# Patient Record
Sex: FEMALE | Race: BLACK OR AFRICAN AMERICAN | Employment: OTHER | URBAN - METROPOLITAN AREA
[De-identification: names, ages, dates, MRNs, and addresses within clinical notes are randomized per-mention and may not be internally consistent; named-entity substitution may affect disease eponyms.]

---

## 2017-01-24 ENCOUNTER — TELEPHONE (OUTPATIENT)
Dept: INTERNAL MEDICINE CLINIC | Age: 67
End: 2017-01-24

## 2017-01-24 RX ORDER — ZOLPIDEM TARTRATE 10 MG/1
TABLET ORAL
Qty: 90 TABLET | Refills: 1 | OUTPATIENT
Start: 2017-01-24 | End: 2017-05-01 | Stop reason: SDUPTHER

## 2017-01-24 RX ORDER — LOSARTAN POTASSIUM 100 MG/1
TABLET ORAL
Qty: 90 TABLET | Refills: 3 | Status: SHIPPED | OUTPATIENT
Start: 2017-01-24 | End: 2018-01-22 | Stop reason: SDUPTHER

## 2017-01-24 RX ORDER — HYDROCHLOROTHIAZIDE 12.5 MG/1
CAPSULE, GELATIN COATED ORAL
Qty: 90 CAPSULE | Refills: 3 | Status: SHIPPED | OUTPATIENT
Start: 2017-01-24 | End: 2018-01-22 | Stop reason: SDUPTHER

## 2017-03-20 ENCOUNTER — TELEPHONE (OUTPATIENT)
Dept: INTERNAL MEDICINE CLINIC | Age: 67
End: 2017-03-20

## 2017-04-18 ENCOUNTER — OFFICE VISIT (OUTPATIENT)
Dept: INTERNAL MEDICINE CLINIC | Age: 67
End: 2017-04-18

## 2017-04-18 VITALS
HEART RATE: 88 BPM | BODY MASS INDEX: 27.64 KG/M2 | HEIGHT: 66 IN | WEIGHT: 172 LBS | SYSTOLIC BLOOD PRESSURE: 134 MMHG | DIASTOLIC BLOOD PRESSURE: 84 MMHG

## 2017-04-18 DIAGNOSIS — Z79.4 TYPE 2 DIABETES MELLITUS WITHOUT COMPLICATION, WITH LONG-TERM CURRENT USE OF INSULIN (HCC): Chronic | ICD-10-CM

## 2017-04-18 DIAGNOSIS — F17.210 CIGARETTE NICOTINE DEPENDENCE WITHOUT COMPLICATION: Chronic | ICD-10-CM

## 2017-04-18 DIAGNOSIS — I10 ESSENTIAL HYPERTENSION: Primary | Chronic | ICD-10-CM

## 2017-04-18 DIAGNOSIS — D50.9 MICROCYTIC ANEMIA: Chronic | ICD-10-CM

## 2017-04-18 DIAGNOSIS — E11.9 TYPE 2 DIABETES MELLITUS WITHOUT COMPLICATION, WITH LONG-TERM CURRENT USE OF INSULIN (HCC): Chronic | ICD-10-CM

## 2017-04-18 PROCEDURE — 4004F PT TOBACCO SCREEN RCVD TLK: CPT | Performed by: INTERNAL MEDICINE

## 2017-04-18 PROCEDURE — 1123F ACP DISCUSS/DSCN MKR DOCD: CPT | Performed by: INTERNAL MEDICINE

## 2017-04-18 PROCEDURE — 3044F HG A1C LEVEL LT 7.0%: CPT | Performed by: INTERNAL MEDICINE

## 2017-04-18 PROCEDURE — 4040F PNEUMOC VAC/ADMIN/RCVD: CPT | Performed by: INTERNAL MEDICINE

## 2017-04-18 PROCEDURE — G8399 PT W/DXA RESULTS DOCUMENT: HCPCS | Performed by: INTERNAL MEDICINE

## 2017-04-18 PROCEDURE — 99213 OFFICE O/P EST LOW 20 MIN: CPT | Performed by: INTERNAL MEDICINE

## 2017-04-18 PROCEDURE — 3014F SCREEN MAMMO DOC REV: CPT | Performed by: INTERNAL MEDICINE

## 2017-04-18 PROCEDURE — 3017F COLORECTAL CA SCREEN DOC REV: CPT | Performed by: INTERNAL MEDICINE

## 2017-04-18 PROCEDURE — G8427 DOCREV CUR MEDS BY ELIG CLIN: HCPCS | Performed by: INTERNAL MEDICINE

## 2017-04-18 PROCEDURE — 1090F PRES/ABSN URINE INCON ASSESS: CPT | Performed by: INTERNAL MEDICINE

## 2017-04-18 PROCEDURE — G8420 CALC BMI NORM PARAMETERS: HCPCS | Performed by: INTERNAL MEDICINE

## 2017-05-01 ENCOUNTER — TELEPHONE (OUTPATIENT)
Dept: INTERNAL MEDICINE CLINIC | Age: 67
End: 2017-05-01

## 2017-05-01 DIAGNOSIS — F51.01 PRIMARY INSOMNIA: Primary | Chronic | ICD-10-CM

## 2017-05-01 DIAGNOSIS — Z79.4 TYPE 2 DIABETES MELLITUS WITHOUT COMPLICATION, WITH LONG-TERM CURRENT USE OF INSULIN (HCC): ICD-10-CM

## 2017-05-01 DIAGNOSIS — E11.9 TYPE 2 DIABETES MELLITUS WITHOUT COMPLICATION, WITH LONG-TERM CURRENT USE OF INSULIN (HCC): ICD-10-CM

## 2017-05-01 RX ORDER — ZOLPIDEM TARTRATE 10 MG/1
TABLET ORAL
Qty: 90 TABLET | Refills: 1 | Status: SHIPPED | OUTPATIENT
Start: 2017-05-01 | End: 2017-10-17 | Stop reason: SDUPTHER

## 2017-08-22 ENCOUNTER — OFFICE VISIT (OUTPATIENT)
Dept: INTERNAL MEDICINE CLINIC | Age: 67
End: 2017-08-22

## 2017-08-22 VITALS
HEIGHT: 66 IN | HEART RATE: 72 BPM | BODY MASS INDEX: 27.32 KG/M2 | DIASTOLIC BLOOD PRESSURE: 84 MMHG | WEIGHT: 170 LBS | SYSTOLIC BLOOD PRESSURE: 122 MMHG

## 2017-08-22 DIAGNOSIS — Z23 NEED FOR PROPHYLACTIC VACCINATION AND INOCULATION AGAINST VARICELLA: ICD-10-CM

## 2017-08-22 DIAGNOSIS — Z12.11 SCREENING FOR COLON CANCER: ICD-10-CM

## 2017-08-22 DIAGNOSIS — E11.9 TYPE 2 DIABETES MELLITUS WITHOUT COMPLICATION, WITH LONG-TERM CURRENT USE OF INSULIN (HCC): Chronic | ICD-10-CM

## 2017-08-22 DIAGNOSIS — I10 ESSENTIAL HYPERTENSION: Primary | Chronic | ICD-10-CM

## 2017-08-22 DIAGNOSIS — Z87.891 HISTORY OF SMOKING 30 OR MORE PACK YEARS: ICD-10-CM

## 2017-08-22 DIAGNOSIS — F17.210 CIGARETTE NICOTINE DEPENDENCE WITHOUT COMPLICATION: Chronic | ICD-10-CM

## 2017-08-22 DIAGNOSIS — J44.9 CHRONIC OBSTRUCTIVE PULMONARY DISEASE, UNSPECIFIED COPD TYPE (HCC): Chronic | ICD-10-CM

## 2017-08-22 DIAGNOSIS — Z79.4 TYPE 2 DIABETES MELLITUS WITHOUT COMPLICATION, WITH LONG-TERM CURRENT USE OF INSULIN (HCC): Chronic | ICD-10-CM

## 2017-08-22 LAB
BASOPHILS ABSOLUTE: 0.2 K/UL (ref 0–0.2)
BASOPHILS RELATIVE PERCENT: 1.3 %
EOSINOPHILS ABSOLUTE: 0.2 K/UL (ref 0–0.6)
EOSINOPHILS RELATIVE PERCENT: 1.9 %
HCT VFR BLD CALC: 35.8 % (ref 36–48)
HEMOGLOBIN: 11.3 G/DL (ref 12–16)
LYMPHOCYTES ABSOLUTE: 3.3 K/UL (ref 1–5.1)
LYMPHOCYTES RELATIVE PERCENT: 29 %
MCH RBC QN AUTO: 25.2 PG (ref 26–34)
MCHC RBC AUTO-ENTMCNC: 31.7 G/DL (ref 31–36)
MCV RBC AUTO: 79.4 FL (ref 80–100)
MONOCYTES ABSOLUTE: 0.5 K/UL (ref 0–1.3)
MONOCYTES RELATIVE PERCENT: 4.6 %
NEUTROPHILS ABSOLUTE: 7.3 K/UL (ref 1.7–7.7)
NEUTROPHILS RELATIVE PERCENT: 63.2 %
PDW BLD-RTO: 16.1 % (ref 12.4–15.4)
PLATELET # BLD: 279 K/UL (ref 135–450)
PMV BLD AUTO: 10.2 FL (ref 5–10.5)
RBC # BLD: 4.51 M/UL (ref 4–5.2)
REASON FOR REJECTION: NORMAL
REJECTED TEST: NORMAL
WBC # BLD: 11.5 K/UL (ref 4–11)

## 2017-08-22 PROCEDURE — G8427 DOCREV CUR MEDS BY ELIG CLIN: HCPCS | Performed by: INTERNAL MEDICINE

## 2017-08-22 PROCEDURE — G8926 SPIRO NO PERF OR DOC: HCPCS | Performed by: INTERNAL MEDICINE

## 2017-08-22 PROCEDURE — 1123F ACP DISCUSS/DSCN MKR DOCD: CPT | Performed by: INTERNAL MEDICINE

## 2017-08-22 PROCEDURE — G9016 DEMO-SMOKING CESSATION COUN: HCPCS | Performed by: INTERNAL MEDICINE

## 2017-08-22 PROCEDURE — 3017F COLORECTAL CA SCREEN DOC REV: CPT | Performed by: INTERNAL MEDICINE

## 2017-08-22 PROCEDURE — 4004F PT TOBACCO SCREEN RCVD TLK: CPT | Performed by: INTERNAL MEDICINE

## 2017-08-22 PROCEDURE — 1090F PRES/ABSN URINE INCON ASSESS: CPT | Performed by: INTERNAL MEDICINE

## 2017-08-22 PROCEDURE — 99214 OFFICE O/P EST MOD 30 MIN: CPT | Performed by: INTERNAL MEDICINE

## 2017-08-22 PROCEDURE — 3023F SPIROM DOC REV: CPT | Performed by: INTERNAL MEDICINE

## 2017-08-22 PROCEDURE — 3046F HEMOGLOBIN A1C LEVEL >9.0%: CPT | Performed by: INTERNAL MEDICINE

## 2017-08-22 PROCEDURE — G8399 PT W/DXA RESULTS DOCUMENT: HCPCS | Performed by: INTERNAL MEDICINE

## 2017-08-22 PROCEDURE — 4040F PNEUMOC VAC/ADMIN/RCVD: CPT | Performed by: INTERNAL MEDICINE

## 2017-08-22 PROCEDURE — G8419 CALC BMI OUT NRM PARAM NOF/U: HCPCS | Performed by: INTERNAL MEDICINE

## 2017-08-22 PROCEDURE — 3014F SCREEN MAMMO DOC REV: CPT | Performed by: INTERNAL MEDICINE

## 2017-08-22 ASSESSMENT — PATIENT HEALTH QUESTIONNAIRE - PHQ9
SUM OF ALL RESPONSES TO PHQ QUESTIONS 1-9: 0
1. LITTLE INTEREST OR PLEASURE IN DOING THINGS: 0
SUM OF ALL RESPONSES TO PHQ9 QUESTIONS 1 & 2: 0
2. FEELING DOWN, DEPRESSED OR HOPELESS: 0

## 2017-08-23 LAB
A/G RATIO: 2 (ref 1.1–2.2)
ALBUMIN SERPL-MCNC: 4.7 G/DL (ref 3.4–5)
ALP BLD-CCNC: 75 U/L (ref 40–129)
ALT SERPL-CCNC: 10 U/L (ref 10–40)
ANION GAP SERPL CALCULATED.3IONS-SCNC: 16 MMOL/L (ref 3–16)
AST SERPL-CCNC: 12 U/L (ref 15–37)
BILIRUB SERPL-MCNC: 0.5 MG/DL (ref 0–1)
BUN BLDV-MCNC: 21 MG/DL (ref 7–20)
CALCIUM SERPL-MCNC: 9.8 MG/DL (ref 8.3–10.6)
CHLORIDE BLD-SCNC: 103 MMOL/L (ref 99–110)
CHOLESTEROL, TOTAL: 225 MG/DL (ref 0–199)
CO2: 23 MMOL/L (ref 21–32)
CREAT SERPL-MCNC: 0.7 MG/DL (ref 0.6–1.2)
ESTIMATED AVERAGE GLUCOSE: 159.9 MG/DL
GFR AFRICAN AMERICAN: >60
GFR NON-AFRICAN AMERICAN: >60
GLOBULIN: 2.4 G/DL
GLUCOSE BLD-MCNC: 112 MG/DL (ref 70–99)
HBA1C MFR BLD: 7.2 %
HDLC SERPL-MCNC: 78 MG/DL (ref 40–60)
LDL CHOLESTEROL CALCULATED: 129 MG/DL
POTASSIUM SERPL-SCNC: 4.7 MMOL/L (ref 3.5–5.1)
SODIUM BLD-SCNC: 142 MMOL/L (ref 136–145)
T4 FREE: 1.2 NG/DL (ref 0.9–1.8)
TOTAL PROTEIN: 7.1 G/DL (ref 6.4–8.2)
TRIGL SERPL-MCNC: 92 MG/DL (ref 0–150)
TSH SERPL DL<=0.05 MIU/L-ACNC: 0.75 UIU/ML (ref 0.27–4.2)
VLDLC SERPL CALC-MCNC: 18 MG/DL

## 2017-09-19 ENCOUNTER — HOSPITAL ENCOUNTER (OUTPATIENT)
Dept: CT IMAGING | Age: 67
Discharge: HOME | End: 2017-09-19
Admitting: INTERNAL MEDICINE

## 2017-09-19 VITALS — RESPIRATION RATE: 18 BRPM | OXYGEN SATURATION: 100 % | HEART RATE: 56 BPM

## 2017-09-19 DIAGNOSIS — Z87.891 HISTORY OF SMOKING 30 OR MORE PACK YEARS: ICD-10-CM

## 2017-09-19 DIAGNOSIS — Z87.891 PERSONAL HISTORY OF NICOTINE DEPENDENCE: ICD-10-CM

## 2017-10-17 DIAGNOSIS — F51.01 PRIMARY INSOMNIA: Chronic | ICD-10-CM

## 2017-10-17 RX ORDER — ZOLPIDEM TARTRATE 10 MG/1
TABLET ORAL
Qty: 90 TABLET | Refills: 1 | Status: SHIPPED | OUTPATIENT
Start: 2017-10-17 | End: 2018-04-02 | Stop reason: SDUPTHER

## 2017-11-08 ENCOUNTER — TELEPHONE (OUTPATIENT)
Dept: CASE MANAGEMENT | Age: 67
End: 2017-11-08

## 2017-11-08 NOTE — TELEPHONE ENCOUNTER
Follow up lung screen on 9/19/17. LRAD1. Recommended screen in one year. Reviewed by ordering physician and ordering office contacts pt with results.

## 2018-02-21 ENCOUNTER — OFFICE VISIT (OUTPATIENT)
Dept: INTERNAL MEDICINE CLINIC | Age: 68
End: 2018-02-21

## 2018-02-21 VITALS
HEART RATE: 72 BPM | WEIGHT: 170 LBS | HEIGHT: 66 IN | DIASTOLIC BLOOD PRESSURE: 68 MMHG | SYSTOLIC BLOOD PRESSURE: 112 MMHG | BODY MASS INDEX: 27.32 KG/M2

## 2018-02-21 DIAGNOSIS — J44.9 CHRONIC OBSTRUCTIVE PULMONARY DISEASE, UNSPECIFIED COPD TYPE (HCC): Chronic | ICD-10-CM

## 2018-02-21 DIAGNOSIS — G89.29 CHRONIC RIGHT-SIDED LOW BACK PAIN WITHOUT SCIATICA: ICD-10-CM

## 2018-02-21 DIAGNOSIS — Z12.31 ENCOUNTER FOR SCREENING MAMMOGRAM FOR BREAST CANCER: ICD-10-CM

## 2018-02-21 DIAGNOSIS — Z79.4 TYPE 2 DIABETES MELLITUS WITHOUT COMPLICATION, WITH LONG-TERM CURRENT USE OF INSULIN (HCC): Chronic | ICD-10-CM

## 2018-02-21 DIAGNOSIS — Z12.11 COLON CANCER SCREENING: ICD-10-CM

## 2018-02-21 DIAGNOSIS — E11.9 TYPE 2 DIABETES MELLITUS WITHOUT COMPLICATION, WITH LONG-TERM CURRENT USE OF INSULIN (HCC): Chronic | ICD-10-CM

## 2018-02-21 DIAGNOSIS — I10 ESSENTIAL HYPERTENSION: Primary | Chronic | ICD-10-CM

## 2018-02-21 DIAGNOSIS — M54.50 CHRONIC RIGHT-SIDED LOW BACK PAIN WITHOUT SCIATICA: ICD-10-CM

## 2018-02-21 DIAGNOSIS — I70.0 ATHEROSCLEROSIS OF ABDOMINAL AORTA (HCC): Chronic | ICD-10-CM

## 2018-02-21 DIAGNOSIS — F17.210 CIGARETTE NICOTINE DEPENDENCE WITHOUT COMPLICATION: Chronic | ICD-10-CM

## 2018-02-21 LAB
CREATININE URINE: 144.6 MG/DL (ref 28–259)
ESTIMATED AVERAGE GLUCOSE: 165.7 MG/DL
HBA1C MFR BLD: 7.4 %
MICROALBUMIN UR-MCNC: 2 MG/DL
MICROALBUMIN/CREAT UR-RTO: 13.8 MG/G (ref 0–30)

## 2018-02-21 PROCEDURE — G8399 PT W/DXA RESULTS DOCUMENT: HCPCS | Performed by: INTERNAL MEDICINE

## 2018-02-21 PROCEDURE — 3023F SPIROM DOC REV: CPT | Performed by: INTERNAL MEDICINE

## 2018-02-21 PROCEDURE — G9016 DEMO-SMOKING CESSATION COUN: HCPCS | Performed by: INTERNAL MEDICINE

## 2018-02-21 PROCEDURE — G8427 DOCREV CUR MEDS BY ELIG CLIN: HCPCS | Performed by: INTERNAL MEDICINE

## 2018-02-21 PROCEDURE — 3014F SCREEN MAMMO DOC REV: CPT | Performed by: INTERNAL MEDICINE

## 2018-02-21 PROCEDURE — 4040F PNEUMOC VAC/ADMIN/RCVD: CPT | Performed by: INTERNAL MEDICINE

## 2018-02-21 PROCEDURE — G8926 SPIRO NO PERF OR DOC: HCPCS | Performed by: INTERNAL MEDICINE

## 2018-02-21 PROCEDURE — 4004F PT TOBACCO SCREEN RCVD TLK: CPT | Performed by: INTERNAL MEDICINE

## 2018-02-21 PROCEDURE — 1123F ACP DISCUSS/DSCN MKR DOCD: CPT | Performed by: INTERNAL MEDICINE

## 2018-02-21 PROCEDURE — G8419 CALC BMI OUT NRM PARAM NOF/U: HCPCS | Performed by: INTERNAL MEDICINE

## 2018-02-21 PROCEDURE — G8598 ASA/ANTIPLAT THER USED: HCPCS | Performed by: INTERNAL MEDICINE

## 2018-02-21 PROCEDURE — 3017F COLORECTAL CA SCREEN DOC REV: CPT | Performed by: INTERNAL MEDICINE

## 2018-02-21 PROCEDURE — 3046F HEMOGLOBIN A1C LEVEL >9.0%: CPT | Performed by: INTERNAL MEDICINE

## 2018-02-21 PROCEDURE — 1090F PRES/ABSN URINE INCON ASSESS: CPT | Performed by: INTERNAL MEDICINE

## 2018-02-21 PROCEDURE — 99214 OFFICE O/P EST MOD 30 MIN: CPT | Performed by: INTERNAL MEDICINE

## 2018-02-21 PROCEDURE — G8484 FLU IMMUNIZE NO ADMIN: HCPCS | Performed by: INTERNAL MEDICINE

## 2018-02-21 RX ORDER — MELOXICAM 15 MG/1
15 TABLET ORAL DAILY
Qty: 30 TABLET | Refills: 3 | Status: SHIPPED | OUTPATIENT
Start: 2018-02-21 | End: 2018-08-01 | Stop reason: ALTCHOICE

## 2018-02-21 ASSESSMENT — ENCOUNTER SYMPTOMS
BLOOD IN STOOL: 0
DIARRHEA: 0
SHORTNESS OF BREATH: 0

## 2018-02-21 NOTE — PROGRESS NOTES
SKIN DAILY  -     Microalbumin / creatinine urine ratio; Future  -     Hemoglobin A1C    Encounter for screening mammogram for breast cancer  -     [facility] Digital Screen Bilateral [[id number]]; Future    Chronic obstructive pulmonary disease, unspecified COPD type ([facility] [address]  Comments:  CT lung cancer screening later this year for 3rd year. Cigarette nicotine dependence without complication  -     [place] DEMO-SMOKING CESSATION COUN    Chronic right-sided low back pain without sciatica  -     meloxicam (MOBIC) 15 MG tablet; Take 1 tablet by mouth daily    Atherosclerosis of abdominal aorta (HCC)  Comments:  Chronic, stable.      Colon cancer screening  -     COLONOSCOPY (Screening)  -     Lm Alejandra MD (SIOBHAN)        Dashawn Helm MD

## 2018-03-28 ENCOUNTER — OFFICE VISIT (OUTPATIENT)
Dept: INTERNAL MEDICINE CLINIC | Age: 68
End: 2018-03-28

## 2018-03-28 VITALS
DIASTOLIC BLOOD PRESSURE: 80 MMHG | BODY MASS INDEX: 27.28 KG/M2 | HEART RATE: 80 BPM | WEIGHT: 169 LBS | SYSTOLIC BLOOD PRESSURE: 132 MMHG

## 2018-03-28 DIAGNOSIS — F17.210 CIGARETTE NICOTINE DEPENDENCE WITHOUT COMPLICATION: Chronic | ICD-10-CM

## 2018-03-28 DIAGNOSIS — J44.9 CHRONIC OBSTRUCTIVE PULMONARY DISEASE, UNSPECIFIED COPD TYPE (HCC): Chronic | ICD-10-CM

## 2018-03-28 DIAGNOSIS — J44.1 COPD WITH ACUTE EXACERBATION (HCC): Primary | ICD-10-CM

## 2018-03-28 PROCEDURE — G8427 DOCREV CUR MEDS BY ELIG CLIN: HCPCS | Performed by: INTERNAL MEDICINE

## 2018-03-28 PROCEDURE — G9016 DEMO-SMOKING CESSATION COUN: HCPCS | Performed by: INTERNAL MEDICINE

## 2018-03-28 PROCEDURE — 1090F PRES/ABSN URINE INCON ASSESS: CPT | Performed by: INTERNAL MEDICINE

## 2018-03-28 PROCEDURE — G8399 PT W/DXA RESULTS DOCUMENT: HCPCS | Performed by: INTERNAL MEDICINE

## 2018-03-28 PROCEDURE — 3023F SPIROM DOC REV: CPT | Performed by: INTERNAL MEDICINE

## 2018-03-28 PROCEDURE — 4040F PNEUMOC VAC/ADMIN/RCVD: CPT | Performed by: INTERNAL MEDICINE

## 2018-03-28 PROCEDURE — 3017F COLORECTAL CA SCREEN DOC REV: CPT | Performed by: INTERNAL MEDICINE

## 2018-03-28 PROCEDURE — G8484 FLU IMMUNIZE NO ADMIN: HCPCS | Performed by: INTERNAL MEDICINE

## 2018-03-28 PROCEDURE — G8926 SPIRO NO PERF OR DOC: HCPCS | Performed by: INTERNAL MEDICINE

## 2018-03-28 PROCEDURE — 3014F SCREEN MAMMO DOC REV: CPT | Performed by: INTERNAL MEDICINE

## 2018-03-28 PROCEDURE — 94640 AIRWAY INHALATION TREATMENT: CPT | Performed by: INTERNAL MEDICINE

## 2018-03-28 PROCEDURE — 99213 OFFICE O/P EST LOW 20 MIN: CPT | Performed by: INTERNAL MEDICINE

## 2018-03-28 PROCEDURE — 4004F PT TOBACCO SCREEN RCVD TLK: CPT | Performed by: INTERNAL MEDICINE

## 2018-03-28 PROCEDURE — G8598 ASA/ANTIPLAT THER USED: HCPCS | Performed by: INTERNAL MEDICINE

## 2018-03-28 PROCEDURE — 1123F ACP DISCUSS/DSCN MKR DOCD: CPT | Performed by: INTERNAL MEDICINE

## 2018-03-28 PROCEDURE — G8419 CALC BMI OUT NRM PARAM NOF/U: HCPCS | Performed by: INTERNAL MEDICINE

## 2018-03-28 RX ORDER — AZITHROMYCIN 250 MG/1
TABLET, FILM COATED ORAL
Qty: 1 PACKET | Refills: 0 | Status: SHIPPED | OUTPATIENT
Start: 2018-03-28 | End: 2018-08-01 | Stop reason: ALTCHOICE

## 2018-03-28 RX ORDER — ALBUTEROL SULFATE 90 UG/1
2 AEROSOL, METERED RESPIRATORY (INHALATION) EVERY 6 HOURS PRN
Qty: 1 INHALER | Refills: 3 | Status: SHIPPED | OUTPATIENT
Start: 2018-03-28 | End: 2018-08-01 | Stop reason: ALTCHOICE

## 2018-03-28 RX ORDER — ALBUTEROL SULFATE 2.5 MG/3ML
2.5 SOLUTION RESPIRATORY (INHALATION) ONCE
Status: COMPLETED | OUTPATIENT
Start: 2018-03-28 | End: 2018-03-28

## 2018-03-28 RX ADMIN — ALBUTEROL SULFATE 2.5 MG: 2.5 SOLUTION RESPIRATORY (INHALATION) at 15:36

## 2018-04-02 ENCOUNTER — TELEPHONE (OUTPATIENT)
Dept: INTERNAL MEDICINE CLINIC | Age: 68
End: 2018-04-02

## 2018-04-02 DIAGNOSIS — F51.01 PRIMARY INSOMNIA: Chronic | ICD-10-CM

## 2018-04-02 RX ORDER — ZOLPIDEM TARTRATE 10 MG/1
TABLET ORAL
Qty: 90 TABLET | Refills: 1 | Status: SHIPPED | OUTPATIENT
Start: 2018-04-02 | End: 2018-10-02 | Stop reason: SDUPTHER

## 2018-07-10 ENCOUNTER — HOSPITAL ENCOUNTER (OUTPATIENT)
Dept: PULMONOLOGY | Age: 68
Discharge: HOME | End: 2018-07-10
Admitting: INTERNAL MEDICINE

## 2018-07-10 VITALS — OXYGEN SATURATION: 100 %

## 2018-07-10 DIAGNOSIS — Z12.31 ENCOUNTER FOR SCREENING MAMMOGRAM FOR BREAST CANCER: ICD-10-CM

## 2018-07-12 DIAGNOSIS — E11.9 TYPE 2 DIABETES MELLITUS WITHOUT COMPLICATION, WITH LONG-TERM CURRENT USE OF INSULIN (HCC): ICD-10-CM

## 2018-07-12 DIAGNOSIS — Z79.4 TYPE 2 DIABETES MELLITUS WITHOUT COMPLICATION, WITH LONG-TERM CURRENT USE OF INSULIN (HCC): ICD-10-CM

## 2018-08-20 ENCOUNTER — HOSPITAL ENCOUNTER (OUTPATIENT)
Dept: ENDOSCOPY | Age: 68
Discharge: HOME | End: 2018-08-20
Attending: INTERNAL MEDICINE | Admitting: INTERNAL MEDICINE

## 2018-08-20 LAB
GLUCOSE BLD-MCNC: 167 MG/DL (ref 70–99)
GLUCOSE BLD-MCNC: 197 MG/DL (ref 70–99)
PERFORMED ON: ABNORMAL
PERFORMED ON: ABNORMAL

## 2018-08-20 ASSESSMENT — COPD QUESTIONNAIRES: CAT_SEVERITY: MILD

## 2018-08-20 ASSESSMENT — LIFESTYLE VARIABLES: SMOKING_STATUS: 1

## 2018-08-20 NOTE — ANESTHESIA PRE-OP
Department of Anesthesiology  Preprocedure Note       Name:  Gabriela Delarosa   Age:  80 y.o.  :  1950                                          MRN:  [id number]         Date:  2018      Surgeon:    Procedure:    Medications prior to admission:   Prior to Admission medications    Medication Sig Start Date End Date Taking? Authorizing Provider   Insulin Pen Needle 32G X 4 MM MISC 1 each by Does not apply route daily 18   Gayle Bell MD   metFORMIN (GLUCOPHAGE) 1000 MG tablet TAKE 1 TABLET TWICE DAILY  WITH MEALS 7/3/18   Gayle Bell MD   glucose blood VI test strips (FREESTYLE LITE) strip 1 each by In Vitro route 3 times daily DX:E11.9 18   Gayle Bell MD   insulin detemir (LEVEMIR FLEXTOUCH) 100 UNIT/ML injection pen INJECT 15 UNITS INTO THE SKIN DAILY 18   Gayle Bell, MD   losartan (COZAAR) 100 MG tablet TAKE 1 TABLET DAILY 18   Gayle Bell MD   hydrochlorothiazide (MICROZIDE) 12.5 MG capsule TAKE 1 CAPSULE DAILY 18   Gayle Bell MD   Calcium Carb-Cholecalciferol (OYSCO D) 250-125 MG-UNIT TABS Take 1 tablet by mouth 2 times daily 16   Gayle Bell MD   Insulin Syringes, Disposable, U-100 1 ML MISC 1 each by Does not apply route daily 16   Gayle Bell MD   glucose monitoring kit (FREESTYLE) monitoring kit 1 kit by Does not apply route daily as needed 7/13/15   Gayle Bell MD   FREESTYLE LANCETS MISC 1 each by Does not apply route daily 7/13/15   Gayle Bell MD   aspirin 81 MG tablet Take 81 mg by mouth daily. Historical Provider, MD   Blood Glucose Monitoring Suppl ([address]) W/DEVICE KIT 1 Device by Does not apply route. 10/7/11   LAWANDA Medellin CNP   glucose blood VI test strips (ONE TOUCH TEST STRIPS) strip As needed.  10/7/11 12/12/17  LAWANDA Medellin CNP       Current medications:    Current Outpatient Prescriptions   Medication Sig Dispense Refill    Insulin Pen Needle 32G X 4 MM MISC 1 each by Does mention of complication, not stated as uncontrolled        Past Surgical History:        Procedure Laterality Date     SECTION      HYSTERECTOMY  1989    OVARIAN CYST REMOVAL         Social History:    Social History   Substance Use Topics    Smoking status: Current Every Day Smoker     Packs/day: 0.50     Years: 20.00     Types: Cigarettes    Smokeless tobacco: Never Used    Alcohol use No                                Ready to quit: Not Answered  Counseling given: Not Answered      Vital Signs (Current): There were no vitals filed for this visit. BP Readings from Last 3 Encounters:   18 131/72   18 132/80   18 112/68       NPO Status:                                                                                 BMI:   Wt Readings from Last 3 Encounters:   18 170 lb (77.1 kg)   18 169 lb (76.7 kg)   18 169 lb (76.7 kg)     There is no height or weight on file to calculate BMI. Anesthesia Evaluation  Patient summary reviewed and Nursing notes reviewed  Airway: Mallampati: II  TM distance: >3 FB   Neck ROM: limited  Mouth opening: > = 3 FB Dental:    (+) lower dentures and upper dentures      Pulmonary:   (+) COPD: mild,  current smoker                           Cardiovascular:  Exercise tolerance: good (>4 METS),   (+) hypertension: mild,                   Neuro/Psych:               GI/Hepatic/Renal:             Endo/Other:    (+) DiabetesType II DM, , .                 Abdominal:           Vascular:   + PVD, aortic or cerebral, . Anesthesia Plan      MAC     ASA 2     (Progress Notes  Encounter Date: 3/28/2018  Keny Biggs MD   Internal Medicine        ManualTemplate  Copied  SUBJECTIVE:     Patient comes to the office complaining of cough, congestion, drainage that has been present for the last several days. Her cough is productive of yellow sputum.  Patient does not have associated this note. This is because the note has never been routed or because communication record creation was suppressed.    )  Induction: intravenous. Anesthetic plan and risks discussed with patient. Plan discussed with CRNA.               Jorge A Issa MD   8/20/2018

## 2018-08-21 ENCOUNTER — OFFICE VISIT (OUTPATIENT)
Dept: INTERNAL MEDICINE CLINIC | Age: 68
End: 2018-08-21

## 2018-08-21 VITALS
SYSTOLIC BLOOD PRESSURE: 132 MMHG | BODY MASS INDEX: 27.32 KG/M2 | DIASTOLIC BLOOD PRESSURE: 84 MMHG | HEART RATE: 80 BPM | HEIGHT: 66 IN | WEIGHT: 170 LBS

## 2018-08-21 DIAGNOSIS — Z79.4 TYPE 2 DIABETES MELLITUS WITHOUT COMPLICATION, WITH LONG-TERM CURRENT USE OF INSULIN (HCC): Chronic | ICD-10-CM

## 2018-08-21 DIAGNOSIS — F17.210 CIGARETTE NICOTINE DEPENDENCE WITHOUT COMPLICATION: Chronic | ICD-10-CM

## 2018-08-21 DIAGNOSIS — I10 ESSENTIAL HYPERTENSION: Primary | Chronic | ICD-10-CM

## 2018-08-21 DIAGNOSIS — E11.9 TYPE 2 DIABETES MELLITUS WITHOUT COMPLICATION, WITH LONG-TERM CURRENT USE OF INSULIN (HCC): Chronic | ICD-10-CM

## 2018-08-21 DIAGNOSIS — I70.0 ATHEROSCLEROSIS OF ABDOMINAL AORTA (HCC): Chronic | ICD-10-CM

## 2018-08-21 DIAGNOSIS — Z23 NEED FOR PROPHYLACTIC VACCINATION AND INOCULATION AGAINST VARICELLA: ICD-10-CM

## 2018-08-21 LAB
CHOLESTEROL, TOTAL: 216 MG/DL (ref 0–199)
HDLC SERPL-MCNC: 78 MG/DL (ref 40–60)
LDL CHOLESTEROL CALCULATED: 115 MG/DL
TRIGL SERPL-MCNC: 114 MG/DL (ref 0–150)
VLDLC SERPL CALC-MCNC: 23 MG/DL

## 2018-08-21 PROCEDURE — 99214 OFFICE O/P EST MOD 30 MIN: CPT | Performed by: INTERNAL MEDICINE

## 2018-08-21 PROCEDURE — 3017F COLORECTAL CA SCREEN DOC REV: CPT | Performed by: INTERNAL MEDICINE

## 2018-08-21 PROCEDURE — 2022F DILAT RTA XM EVC RTNOPTHY: CPT | Performed by: INTERNAL MEDICINE

## 2018-08-21 PROCEDURE — 1101F PT FALLS ASSESS-DOCD LE1/YR: CPT | Performed by: INTERNAL MEDICINE

## 2018-08-21 PROCEDURE — G8598 ASA/ANTIPLAT THER USED: HCPCS | Performed by: INTERNAL MEDICINE

## 2018-08-21 PROCEDURE — G8399 PT W/DXA RESULTS DOCUMENT: HCPCS | Performed by: INTERNAL MEDICINE

## 2018-08-21 PROCEDURE — 1090F PRES/ABSN URINE INCON ASSESS: CPT | Performed by: INTERNAL MEDICINE

## 2018-08-21 PROCEDURE — 1123F ACP DISCUSS/DSCN MKR DOCD: CPT | Performed by: INTERNAL MEDICINE

## 2018-08-21 PROCEDURE — 3045F PR MOST RECENT HEMOGLOBIN A1C LEVEL 7.0-9.0%: CPT | Performed by: INTERNAL MEDICINE

## 2018-08-21 PROCEDURE — G8427 DOCREV CUR MEDS BY ELIG CLIN: HCPCS | Performed by: INTERNAL MEDICINE

## 2018-08-21 PROCEDURE — G8419 CALC BMI OUT NRM PARAM NOF/U: HCPCS | Performed by: INTERNAL MEDICINE

## 2018-08-21 PROCEDURE — 4040F PNEUMOC VAC/ADMIN/RCVD: CPT | Performed by: INTERNAL MEDICINE

## 2018-08-21 PROCEDURE — 4004F PT TOBACCO SCREEN RCVD TLK: CPT | Performed by: INTERNAL MEDICINE

## 2018-08-21 ASSESSMENT — PATIENT HEALTH QUESTIONNAIRE - PHQ9
1. LITTLE INTEREST OR PLEASURE IN DOING THINGS: 0
SUM OF ALL RESPONSES TO PHQ QUESTIONS 1-9: 0
SUM OF ALL RESPONSES TO PHQ9 QUESTIONS 1 & 2: 0
2. FEELING DOWN, DEPRESSED OR HOPELESS: 0
SUM OF ALL RESPONSES TO PHQ QUESTIONS 1-9: 0

## 2018-08-21 ASSESSMENT — ENCOUNTER SYMPTOMS
SHORTNESS OF BREATH: 0
WHEEZING: 0

## 2018-08-22 LAB
ESTIMATED AVERAGE GLUCOSE: 205.9 MG/DL
HBA1C MFR BLD: 8.8 %

## 2018-10-02 DIAGNOSIS — F51.01 PRIMARY INSOMNIA: Chronic | ICD-10-CM

## 2018-10-02 RX ORDER — ZOLPIDEM TARTRATE 10 MG/1
TABLET ORAL
Qty: 90 TABLET | Refills: 1 | Status: SHIPPED | OUTPATIENT
Start: 2018-10-02 | End: 2019-03-26 | Stop reason: SDUPTHER

## 2018-11-21 ENCOUNTER — HOSPITAL ENCOUNTER (EMERGENCY)
Age: 68
Discharge: HOME | End: 2018-11-21
Attending: EMERGENCY MEDICINE
Payer: MEDICARE

## 2018-11-21 ENCOUNTER — APPOINTMENT (OUTPATIENT)
Dept: GENERAL RADIOLOGY | Age: 68
End: 2018-11-21
Payer: MEDICARE

## 2018-11-21 VITALS
HEIGHT: 66 IN | WEIGHT: 172 LBS | BODY MASS INDEX: 27.64 KG/M2 | RESPIRATION RATE: 16 BRPM | HEART RATE: 96 BPM | TEMPERATURE: 98.5 F | DIASTOLIC BLOOD PRESSURE: 91 MMHG | OXYGEN SATURATION: 100 % | SYSTOLIC BLOOD PRESSURE: 164 MMHG

## 2018-11-21 DIAGNOSIS — M75.82 ROTATOR CUFF TENDINITIS, LEFT: ICD-10-CM

## 2018-11-21 DIAGNOSIS — M25.512 LEFT SHOULDER PAIN, UNSPECIFIED CHRONICITY: Primary | ICD-10-CM

## 2018-11-21 LAB
EKG ATRIAL RATE: 94 BPM
EKG DIAGNOSIS: NORMAL
EKG P AXIS: 60 DEGREES
EKG P-R INTERVAL: 146 MS
EKG Q-T INTERVAL: 358 MS
EKG QRS DURATION: 68 MS
EKG QTC CALCULATION (BAZETT): 447 MS
EKG R AXIS: -8 DEGREES
EKG T AXIS: 34 DEGREES
EKG VENTRICULAR RATE: 94 BPM

## 2018-11-21 PROCEDURE — 99283 EMERGENCY DEPT VISIT LOW MDM: CPT

## 2018-11-21 PROCEDURE — 93010 ELECTROCARDIOGRAM REPORT: CPT | Performed by: INTERNAL MEDICINE

## 2018-11-21 PROCEDURE — 96372 THER/PROPH/DIAG INJ SC/IM: CPT

## 2018-11-21 PROCEDURE — 93005 ELECTROCARDIOGRAM TRACING: CPT | Performed by: NURSE PRACTITIONER

## 2018-11-21 PROCEDURE — 73030 X-RAY EXAM OF SHOULDER: CPT

## 2018-11-21 RX ORDER — LIDOCAINE 50 MG/G
1 PATCH TOPICAL ONCE
Status: DISCONTINUED | OUTPATIENT
Start: 2018-11-21 | End: 2018-11-21 | Stop reason: HOSPADM

## 2018-11-21 RX ORDER — IBUPROFEN 600 MG/1
600 TABLET ORAL ONCE
Status: DISCONTINUED | OUTPATIENT
Start: 2018-11-21 | End: 2018-11-21

## 2018-11-21 RX ORDER — HYDROCODONE BITARTRATE AND ACETAMINOPHEN 5; 325 MG/1; MG/1
1 TABLET ORAL EVERY 6 HOURS PRN
Qty: 5 TABLET | Refills: 0 | Status: SHIPPED | OUTPATIENT
Start: 2018-11-21 | End: 2018-11-28

## 2018-11-21 RX ORDER — KETOROLAC TROMETHAMINE 30 MG/ML
30 INJECTION, SOLUTION INTRAMUSCULAR; INTRAVENOUS ONCE
Status: DISCONTINUED | OUTPATIENT
Start: 2018-11-21 | End: 2018-11-21 | Stop reason: HOSPADM

## 2018-11-21 RX ORDER — LIDOCAINE 50 MG/G
1 PATCH TOPICAL DAILY
Qty: 30 PATCH | Refills: 0 | Status: SHIPPED | OUTPATIENT
Start: 2018-11-21 | End: 2019-02-18

## 2018-11-21 RX ORDER — IBUPROFEN 600 MG/1
600 TABLET ORAL EVERY 6 HOURS PRN
Qty: 30 TABLET | Refills: 0 | Status: SHIPPED | OUTPATIENT
Start: 2018-11-21 | End: 2018-11-30

## 2018-11-21 ASSESSMENT — PAIN DESCRIPTION - PAIN TYPE: TYPE: ACUTE PAIN;CHRONIC PAIN

## 2018-11-21 ASSESSMENT — PAIN SCALES - GENERAL
PAINLEVEL_OUTOF10: 10
PAINLEVEL_OUTOF10: 10
PAINLEVEL_OUTOF10: 7

## 2018-11-21 ASSESSMENT — PAIN DESCRIPTION - FREQUENCY: FREQUENCY: INTERMITTENT

## 2018-11-21 ASSESSMENT — ENCOUNTER SYMPTOMS
NAUSEA: 0
SHORTNESS OF BREATH: 0
VOMITING: 0

## 2018-11-21 ASSESSMENT — PAIN DESCRIPTION - ORIENTATION: ORIENTATION: LEFT

## 2018-11-21 ASSESSMENT — PAIN DESCRIPTION - LOCATION: LOCATION: SHOULDER

## 2018-11-30 ENCOUNTER — OFFICE VISIT (OUTPATIENT)
Dept: ORTHOPEDIC SURGERY | Age: 68
End: 2018-11-30
Payer: MEDICARE

## 2018-11-30 VITALS
SYSTOLIC BLOOD PRESSURE: 133 MMHG | WEIGHT: 172 LBS | BODY MASS INDEX: 27.64 KG/M2 | DIASTOLIC BLOOD PRESSURE: 84 MMHG | HEART RATE: 72 BPM | HEIGHT: 66 IN

## 2018-11-30 DIAGNOSIS — M75.32 CALCIFIC TENDONITIS OF LEFT SHOULDER: Primary | ICD-10-CM

## 2018-11-30 PROCEDURE — 1101F PT FALLS ASSESS-DOCD LE1/YR: CPT | Performed by: ORTHOPAEDIC SURGERY

## 2018-11-30 PROCEDURE — 1123F ACP DISCUSS/DSCN MKR DOCD: CPT | Performed by: ORTHOPAEDIC SURGERY

## 2018-11-30 PROCEDURE — 4040F PNEUMOC VAC/ADMIN/RCVD: CPT | Performed by: ORTHOPAEDIC SURGERY

## 2018-11-30 PROCEDURE — G8484 FLU IMMUNIZE NO ADMIN: HCPCS | Performed by: ORTHOPAEDIC SURGERY

## 2018-11-30 PROCEDURE — 99203 OFFICE O/P NEW LOW 30 MIN: CPT | Performed by: ORTHOPAEDIC SURGERY

## 2018-11-30 PROCEDURE — 1090F PRES/ABSN URINE INCON ASSESS: CPT | Performed by: ORTHOPAEDIC SURGERY

## 2018-11-30 PROCEDURE — G8427 DOCREV CUR MEDS BY ELIG CLIN: HCPCS | Performed by: ORTHOPAEDIC SURGERY

## 2018-11-30 PROCEDURE — G8598 ASA/ANTIPLAT THER USED: HCPCS | Performed by: ORTHOPAEDIC SURGERY

## 2018-11-30 PROCEDURE — 4004F PT TOBACCO SCREEN RCVD TLK: CPT | Performed by: ORTHOPAEDIC SURGERY

## 2018-11-30 PROCEDURE — G8419 CALC BMI OUT NRM PARAM NOF/U: HCPCS | Performed by: ORTHOPAEDIC SURGERY

## 2018-11-30 PROCEDURE — 3017F COLORECTAL CA SCREEN DOC REV: CPT | Performed by: ORTHOPAEDIC SURGERY

## 2018-11-30 PROCEDURE — G8399 PT W/DXA RESULTS DOCUMENT: HCPCS | Performed by: ORTHOPAEDIC SURGERY

## 2018-11-30 RX ORDER — IBUPROFEN 600 MG/1
600 TABLET ORAL 3 TIMES DAILY PRN
Qty: 90 TABLET | Refills: 0 | Status: SHIPPED | OUTPATIENT
Start: 2018-11-30 | End: 2019-05-20 | Stop reason: SDUPTHER

## 2018-12-11 ENCOUNTER — HOSPITAL ENCOUNTER (OUTPATIENT)
Dept: PHYSICAL THERAPY | Age: 68
Setting detail: THERAPIES SERIES
Discharge: HOME | End: 2018-12-11
Payer: MEDICARE

## 2018-12-11 PROCEDURE — 97161 PT EVAL LOW COMPLEX 20 MIN: CPT

## 2018-12-11 PROCEDURE — G8984 CARRY CURRENT STATUS: HCPCS

## 2018-12-11 PROCEDURE — 97530 THERAPEUTIC ACTIVITIES: CPT

## 2018-12-11 PROCEDURE — G8985 CARRY GOAL STATUS: HCPCS

## 2018-12-11 PROCEDURE — 97110 THERAPEUTIC EXERCISES: CPT

## 2018-12-11 NOTE — PROGRESS NOTES
Physical Therapy  Initial Assessment  Date: 2018  Patient Name: Sarrah Aschoff  MRN: [id number]  : 1950     Treatment Diagnosis:  L shoulder hypomobility and strength deficits which limit her tolerance of using L UE for fxnl tasks including lifting and reaching and dressing    Restrictions  Outpatient fall risk assessment completed asking screening question if patient has fallen in the past 30 days:  [x] Yes  [] No    Based on screen for falls, patient demonstrates fall risk:  [] Yes  [x] No    Interventions based on fall risk status:  Updated Problem List within Medical History  [] Yes   [x] N/A    Asked family to assist with increased observation of the patient  [] Yes   [x] N/A    Patient kept in visible area when not closely supervised by therapist  [] Yes   [x] N/A    Repeatedly reinforce activity limits and safety needs with patient/family  [] Yes   [x] N/A    Increase frequency of rounding/monitoring patient  [] Yes   [x] N/A           Subjective   General  Chart Reviewed: Yes  Family / Caregiver Present: No  Referring Practitioner: Dr. 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 no injury. went to ER on 18 due to severe L shoulder pain. ER doc referred her to Dr. Stephania Meraz. Pt reports MD hopes PT tx can build her strength back up. PAIN: 1-3/10 dependes on activity. pain at L shoulder always and sometimes into L forearm. reports when the shoulder pain becomes mroe intense, she will also get pain in her forearm. reports MD gave her ibuprofin which has helped a lot with pain. PLOF: able to do all ADLs/IALDs/activities without L shoulder or forearm pain.  CLOF: avoids lifting with just

## 2018-12-18 ENCOUNTER — HOSPITAL ENCOUNTER (OUTPATIENT)
Dept: PHYSICAL THERAPY | Age: 68
Setting detail: THERAPIES SERIES
Discharge: HOME | End: 2018-12-18
Payer: MEDICARE

## 2018-12-18 PROCEDURE — 97140 MANUAL THERAPY 1/> REGIONS: CPT

## 2018-12-18 PROCEDURE — 97110 THERAPEUTIC EXERCISES: CPT

## 2018-12-18 NOTE — FLOWSHEET NOTE
Tolerance:  [x] Patient tolerated treatment well [] Patient limited by fatigue  [] Patient limited by pain  [] Patient limited by other medical complications  [] Other:     Prognosis: [x] Good [] Fair  [] Poor    Patient Requires Follow-up: [x] Yes  [] No    Plan:   [x] Continue per plan of care [] Alter current plan (see comments)  [] Plan of care initiated [] Hold pending MD visit [] Discharge    Plan for Next Session:  RTC/scap strength, HEP - emphasis, manual, MOC prn for pain, posture, ROM, improve scap hum rhythm.  Pt requests 1x/wk tx sessions so that she can focus on HEP    Electronically signed by:  Mey Don PT DPT

## 2018-12-20 ENCOUNTER — APPOINTMENT (OUTPATIENT)
Dept: PHYSICAL THERAPY | Age: 68
End: 2018-12-20
Payer: MEDICARE

## 2018-12-30 DIAGNOSIS — Z79.4 TYPE 2 DIABETES MELLITUS WITHOUT COMPLICATION, WITH LONG-TERM CURRENT USE OF INSULIN (HCC): ICD-10-CM

## 2018-12-30 DIAGNOSIS — E11.9 TYPE 2 DIABETES MELLITUS WITHOUT COMPLICATION, WITH LONG-TERM CURRENT USE OF INSULIN (HCC): ICD-10-CM

## 2019-01-17 RX ORDER — HYDROCHLOROTHIAZIDE 12.5 MG/1
CAPSULE, GELATIN COATED ORAL
Qty: 90 CAPSULE | Refills: 3 | Status: SHIPPED | OUTPATIENT
Start: 2019-01-17 | End: 2020-01-07 | Stop reason: SDUPTHER

## 2019-01-17 RX ORDER — LOSARTAN POTASSIUM 100 MG/1
TABLET ORAL
Qty: 90 TABLET | Refills: 3 | Status: SHIPPED | OUTPATIENT
Start: 2019-01-17 | End: 2020-01-07 | Stop reason: SDUPTHER

## 2019-02-18 ENCOUNTER — OFFICE VISIT (OUTPATIENT)
Dept: INTERNAL MEDICINE CLINIC | Age: 69
End: 2019-02-18
Payer: MEDICARE

## 2019-02-18 VITALS
DIASTOLIC BLOOD PRESSURE: 72 MMHG | HEIGHT: 66 IN | BODY MASS INDEX: 28.77 KG/M2 | SYSTOLIC BLOOD PRESSURE: 110 MMHG | HEART RATE: 64 BPM | WEIGHT: 179 LBS

## 2019-02-18 DIAGNOSIS — I70.0 ATHEROSCLEROSIS OF ABDOMINAL AORTA (HCC): Chronic | ICD-10-CM

## 2019-02-18 DIAGNOSIS — Z79.4 TYPE 2 DIABETES MELLITUS WITHOUT COMPLICATION, WITH LONG-TERM CURRENT USE OF INSULIN (HCC): Chronic | ICD-10-CM

## 2019-02-18 DIAGNOSIS — F17.210 CIGARETTE NICOTINE DEPENDENCE WITHOUT COMPLICATION: Chronic | ICD-10-CM

## 2019-02-18 DIAGNOSIS — I10 ESSENTIAL HYPERTENSION: Primary | Chronic | ICD-10-CM

## 2019-02-18 DIAGNOSIS — E11.9 TYPE 2 DIABETES MELLITUS WITHOUT COMPLICATION, WITH LONG-TERM CURRENT USE OF INSULIN (HCC): Chronic | ICD-10-CM

## 2019-02-18 DIAGNOSIS — J44.9 CHRONIC OBSTRUCTIVE PULMONARY DISEASE, UNSPECIFIED COPD TYPE (HCC): Chronic | ICD-10-CM

## 2019-02-18 PROCEDURE — 2022F DILAT RTA XM EVC RTNOPTHY: CPT | Performed by: INTERNAL MEDICINE

## 2019-02-18 PROCEDURE — G8419 CALC BMI OUT NRM PARAM NOF/U: HCPCS | Performed by: INTERNAL MEDICINE

## 2019-02-18 PROCEDURE — G8926 SPIRO NO PERF OR DOC: HCPCS | Performed by: INTERNAL MEDICINE

## 2019-02-18 PROCEDURE — 1101F PT FALLS ASSESS-DOCD LE1/YR: CPT | Performed by: INTERNAL MEDICINE

## 2019-02-18 PROCEDURE — 1123F ACP DISCUSS/DSCN MKR DOCD: CPT | Performed by: INTERNAL MEDICINE

## 2019-02-18 PROCEDURE — G8510 SCR DEP NEG, NO PLAN REQD: HCPCS | Performed by: INTERNAL MEDICINE

## 2019-02-18 PROCEDURE — 3046F HEMOGLOBIN A1C LEVEL >9.0%: CPT | Performed by: INTERNAL MEDICINE

## 2019-02-18 PROCEDURE — G8399 PT W/DXA RESULTS DOCUMENT: HCPCS | Performed by: INTERNAL MEDICINE

## 2019-02-18 PROCEDURE — 4040F PNEUMOC VAC/ADMIN/RCVD: CPT | Performed by: INTERNAL MEDICINE

## 2019-02-18 PROCEDURE — G8484 FLU IMMUNIZE NO ADMIN: HCPCS | Performed by: INTERNAL MEDICINE

## 2019-02-18 PROCEDURE — 1090F PRES/ABSN URINE INCON ASSESS: CPT | Performed by: INTERNAL MEDICINE

## 2019-02-18 PROCEDURE — G8598 ASA/ANTIPLAT THER USED: HCPCS | Performed by: INTERNAL MEDICINE

## 2019-02-18 PROCEDURE — G8427 DOCREV CUR MEDS BY ELIG CLIN: HCPCS | Performed by: INTERNAL MEDICINE

## 2019-02-18 PROCEDURE — 4004F PT TOBACCO SCREEN RCVD TLK: CPT | Performed by: INTERNAL MEDICINE

## 2019-02-18 PROCEDURE — 99214 OFFICE O/P EST MOD 30 MIN: CPT | Performed by: INTERNAL MEDICINE

## 2019-02-18 PROCEDURE — 3017F COLORECTAL CA SCREEN DOC REV: CPT | Performed by: INTERNAL MEDICINE

## 2019-02-18 PROCEDURE — 3023F SPIROM DOC REV: CPT | Performed by: INTERNAL MEDICINE

## 2019-02-18 RX ORDER — ALBUTEROL SULFATE 90 UG/1
2 AEROSOL, METERED RESPIRATORY (INHALATION) EVERY 6 HOURS PRN
Qty: 1 INHALER | Refills: 3 | Status: SHIPPED | OUTPATIENT
Start: 2019-02-18 | End: 2019-05-20 | Stop reason: SDUPTHER

## 2019-02-18 RX ORDER — VARENICLINE TARTRATE 25 MG
KIT ORAL
Qty: 1 EACH | Refills: 0 | Status: SHIPPED | OUTPATIENT
Start: 2019-02-18 | End: 2019-05-20

## 2019-02-18 ASSESSMENT — PATIENT HEALTH QUESTIONNAIRE - PHQ9
SUM OF ALL RESPONSES TO PHQ QUESTIONS 1-9: 0
2. FEELING DOWN, DEPRESSED OR HOPELESS: 0
SUM OF ALL RESPONSES TO PHQ9 QUESTIONS 1 & 2: 0
SUM OF ALL RESPONSES TO PHQ QUESTIONS 1-9: 0
1. LITTLE INTEREST OR PLEASURE IN DOING THINGS: 0

## 2019-02-19 LAB
ESTIMATED AVERAGE GLUCOSE: 217.3 MG/DL
HBA1C MFR BLD: 9.2 %

## 2019-03-05 ENCOUNTER — HOSPITAL ENCOUNTER (OUTPATIENT)
Dept: ULTRASOUND IMAGING | Age: 69
Discharge: HOME | End: 2019-03-05
Payer: MEDICARE

## 2019-03-05 ENCOUNTER — HOSPITAL ENCOUNTER (OUTPATIENT)
Dept: CT IMAGING | Age: 69
Discharge: HOME | End: 2019-03-05
Payer: MEDICARE

## 2019-03-05 DIAGNOSIS — F17.210 CIGARETTE NICOTINE DEPENDENCE WITHOUT COMPLICATION: Chronic | ICD-10-CM

## 2019-03-05 DIAGNOSIS — I70.0 ATHEROSCLEROSIS OF ABDOMINAL AORTA (HCC): Chronic | ICD-10-CM

## 2019-03-05 PROCEDURE — 76775 US EXAM ABDO BACK WALL LIM: CPT

## 2019-03-05 PROCEDURE — 71250 CT THORAX DX C-: CPT

## 2019-03-26 DIAGNOSIS — F51.01 PRIMARY INSOMNIA: Chronic | ICD-10-CM

## 2019-03-26 RX ORDER — ZOLPIDEM TARTRATE 10 MG/1
TABLET ORAL
Qty: 90 TABLET | Refills: 1 | Status: SHIPPED | OUTPATIENT
Start: 2019-03-26 | End: 2019-09-20 | Stop reason: SDUPTHER

## 2019-04-17 ENCOUNTER — CARE COORDINATION (OUTPATIENT)
Dept: CARE COORDINATION | Age: 69
End: 2019-04-17

## 2019-04-22 ENCOUNTER — CARE COORDINATION (OUTPATIENT)
Dept: CARE COORDINATION | Age: 69
End: 2019-04-22

## 2019-04-30 ENCOUNTER — CARE COORDINATION (OUTPATIENT)
Dept: CARE COORDINATION | Age: 69
End: 2019-04-30

## 2019-05-08 ENCOUNTER — CARE COORDINATION (OUTPATIENT)
Dept: CARE COORDINATION | Age: 69
End: 2019-05-08

## 2019-05-20 ENCOUNTER — OFFICE VISIT (OUTPATIENT)
Dept: INTERNAL MEDICINE CLINIC | Age: 69
End: 2019-05-20
Payer: MEDICARE

## 2019-05-20 VITALS
BODY MASS INDEX: 28.45 KG/M2 | SYSTOLIC BLOOD PRESSURE: 130 MMHG | WEIGHT: 177 LBS | HEIGHT: 66 IN | DIASTOLIC BLOOD PRESSURE: 78 MMHG | HEART RATE: 72 BPM

## 2019-05-20 DIAGNOSIS — M54.50 CHRONIC RIGHT-SIDED LOW BACK PAIN WITHOUT SCIATICA: ICD-10-CM

## 2019-05-20 DIAGNOSIS — J44.9 CHRONIC OBSTRUCTIVE PULMONARY DISEASE, UNSPECIFIED COPD TYPE (HCC): Chronic | ICD-10-CM

## 2019-05-20 DIAGNOSIS — Z79.4 TYPE 2 DIABETES MELLITUS WITHOUT COMPLICATION, WITH LONG-TERM CURRENT USE OF INSULIN (HCC): Chronic | ICD-10-CM

## 2019-05-20 DIAGNOSIS — I10 ESSENTIAL HYPERTENSION: ICD-10-CM

## 2019-05-20 DIAGNOSIS — G89.29 CHRONIC RIGHT-SIDED LOW BACK PAIN WITHOUT SCIATICA: ICD-10-CM

## 2019-05-20 DIAGNOSIS — Z79.4 TYPE 2 DIABETES MELLITUS WITHOUT COMPLICATION, WITH LONG-TERM CURRENT USE OF INSULIN (HCC): ICD-10-CM

## 2019-05-20 DIAGNOSIS — Z23 NEED FOR PNEUMOCOCCAL VACCINATION: Primary | ICD-10-CM

## 2019-05-20 DIAGNOSIS — E11.9 TYPE 2 DIABETES MELLITUS WITHOUT COMPLICATION, WITH LONG-TERM CURRENT USE OF INSULIN (HCC): ICD-10-CM

## 2019-05-20 DIAGNOSIS — E11.9 TYPE 2 DIABETES MELLITUS WITHOUT COMPLICATION, WITH LONG-TERM CURRENT USE OF INSULIN (HCC): Chronic | ICD-10-CM

## 2019-05-20 LAB — HBA1C MFR BLD: 8.8 %

## 2019-05-20 PROCEDURE — G8926 SPIRO NO PERF OR DOC: HCPCS | Performed by: INTERNAL MEDICINE

## 2019-05-20 PROCEDURE — 3046F HEMOGLOBIN A1C LEVEL >9.0%: CPT | Performed by: INTERNAL MEDICINE

## 2019-05-20 PROCEDURE — 83036 HEMOGLOBIN GLYCOSYLATED A1C: CPT | Performed by: INTERNAL MEDICINE

## 2019-05-20 PROCEDURE — 1090F PRES/ABSN URINE INCON ASSESS: CPT | Performed by: INTERNAL MEDICINE

## 2019-05-20 PROCEDURE — G0009 ADMIN PNEUMOCOCCAL VACCINE: HCPCS | Performed by: INTERNAL MEDICINE

## 2019-05-20 PROCEDURE — G8419 CALC BMI OUT NRM PARAM NOF/U: HCPCS | Performed by: INTERNAL MEDICINE

## 2019-05-20 PROCEDURE — 1123F ACP DISCUSS/DSCN MKR DOCD: CPT | Performed by: INTERNAL MEDICINE

## 2019-05-20 PROCEDURE — 4004F PT TOBACCO SCREEN RCVD TLK: CPT | Performed by: INTERNAL MEDICINE

## 2019-05-20 PROCEDURE — 3023F SPIROM DOC REV: CPT | Performed by: INTERNAL MEDICINE

## 2019-05-20 PROCEDURE — 2022F DILAT RTA XM EVC RTNOPTHY: CPT | Performed by: INTERNAL MEDICINE

## 2019-05-20 PROCEDURE — G8427 DOCREV CUR MEDS BY ELIG CLIN: HCPCS | Performed by: INTERNAL MEDICINE

## 2019-05-20 PROCEDURE — G8399 PT W/DXA RESULTS DOCUMENT: HCPCS | Performed by: INTERNAL MEDICINE

## 2019-05-20 PROCEDURE — 4040F PNEUMOC VAC/ADMIN/RCVD: CPT | Performed by: INTERNAL MEDICINE

## 2019-05-20 PROCEDURE — 3017F COLORECTAL CA SCREEN DOC REV: CPT | Performed by: INTERNAL MEDICINE

## 2019-05-20 PROCEDURE — 99213 OFFICE O/P EST LOW 20 MIN: CPT | Performed by: INTERNAL MEDICINE

## 2019-05-20 PROCEDURE — G8598 ASA/ANTIPLAT THER USED: HCPCS | Performed by: INTERNAL MEDICINE

## 2019-05-20 PROCEDURE — 90732 PPSV23 VACC 2 YRS+ SUBQ/IM: CPT | Performed by: INTERNAL MEDICINE

## 2019-05-20 RX ORDER — IBUPROFEN 600 MG/1
600 TABLET ORAL 3 TIMES DAILY PRN
Qty: 90 TABLET | Refills: 1 | Status: SHIPPED | OUTPATIENT
Start: 2019-05-20 | End: 2020-02-21

## 2019-05-20 RX ORDER — ALBUTEROL SULFATE 90 UG/1
2 AEROSOL, METERED RESPIRATORY (INHALATION) EVERY 6 HOURS PRN
Qty: 1 INHALER | Refills: 3 | Status: SHIPPED | OUTPATIENT
Start: 2019-05-20 | End: 2022-05-11

## 2019-05-20 NOTE — PROGRESS NOTES
SUBJECTIVE:  Patient ID: Juan Coronado is a 77 y.o. y.o. female     HPI    Juan Coronado returns for follow up of hypertension. Patient has been taking Her medications as prescribed. Patient's blood pressure is  controlled. Side effects related to taking the medications include no medication side effects noted    Patient returns to the office for follow up of her diabetes. Her last hemoglobin A1c was 9.4. She iscompliant with her medications but admits to a lot of dietary noncompliance. Patient has no symptoms related to her condition such as blurred vision, slurred speech, chest pain orshortness of breath. Review of Systems    OBJECTIVE:    /78   Pulse 72   Ht 5' 6\" (1.676 m)   Wt 177 lb (80.3 kg)   BMI 28.57 kg/m²      Physical Exam   Constitutional: She is oriented to person, place, and time. She appears well-developed and well-nourished. No distress. HENT:   Head: Normocephalic and atraumatic. Pulmonary/Chest: Effort normal.   Neurological: She is alert and oriented to person, place, and time. No cranial nerve deficit. Skin: She is not diaphoretic. Psychiatric: She has a normal mood and affect. Her behavior is normal. Judgment and thought content normal.   Vitals reviewed.         Current Outpatient Medications:     blood glucose test strips (FREESTYLE LITE) strip, USE 1 STRIP TO CHECK GLUCOSE THREE TIMES DAILY DX:E11.9, Disp: 100 each, Rfl: 1    metFORMIN (GLUCOPHAGE) 1000 MG tablet, TAKE 1 TABLET TWICE DAILY  WITH MEALS, Disp: 180 tablet, Rfl: 1    losartan (COZAAR) 100 MG tablet, TAKE 1 TABLET DAILY, Disp: 90 tablet, Rfl: 3    hydrochlorothiazide (MICROZIDE) 12.5 MG capsule, TAKE 1 CAPSULE DAILY, Disp: 90 capsule, Rfl: 3    ibuprofen (ADVIL;MOTRIN) 600 MG tablet, Take 1 tablet by mouth 3 times daily as needed for Pain, Disp: 90 tablet, Rfl: 0    insulin detemir (LEVEMIR FLEXTOUCH) 100 UNIT/ML injection pen, INJECT 15 UNITS INTO THE SKIN DAILY, Disp: 5 pen, Rfl: 5    Insulin Pen Needle 32G X 4 MM MISC, 1 each by Does not apply route daily, Disp: 100 each, Rfl: 3    Calcium Carb-Cholecalciferol (OYSCO D) 250-125 MG-UNIT TABS, Take 1 tablet by mouth 2 times daily, Disp: 60 tablet, Rfl: 5    Insulin Syringes, Disposable, U-100 1 ML MISC, 1 each by Does not apply route daily, Disp: 100 each, Rfl: 3    glucose monitoring kit (FREESTYLE) monitoring kit, 1 kit by Does not apply route daily as needed, Disp: 1 kit, Rfl: 0    FREESTYLE LANCETS MISC, 1 each by Does not apply route daily, Disp: 100 each, Rfl: 3    aspirin 81 MG tablet, Take 81 mg by mouth daily. , Disp: , Rfl:     Blood Glucose Monitoring Suppl (ONE TOUCH BASIC SYSTEM) W/DEVICE KIT, 1 Device by Does not apply route., Disp: 1 kit, Rfl: 0    albuterol sulfate HFA (PROVENTIL HFA) 108 (90 Base) MCG/ACT inhaler, Inhale 2 puffs into the lungs every 6 hours as needed for Wheezing, Disp: 1 Inhaler, Rfl: 3    Assessment/Plan:  Aracelis was seen today for copd and diabetes. Diagnoses and all orders for this visit:    Need for pneumococcal vaccination  -     PNEUMOVAX 23 subcutaneous/IM (Pneumococcal polysaccharide vaccine 23-valent >= 1yo)    Essential hypertension    Chronic obstructive pulmonary disease, unspecified COPD type ([address]  -     albuterol sulfate HFA (PROVENTIL HFA) 108 (90 Base) MCG/ACT inhaler; Inhale 2 puffs into the lungs every 6 hours as needed for Wheezing    Type 2 diabetes mellitus without complication, with long-term current use of insulin ([facility])  -     POCT glycosylated hemoglobin (Hb A1C)  -     insulin detemir (LEVEMIR FLEXTOUCH) 100 UNIT/ML injection pen; INJECT 25 UNITS INTO THE SKIN DAILY    Chronic right-sided low back pain without sciatica  -     ibuprofen (ADVIL;MOTRIN) 600 MG tablet;  Take 1 tablet by mouth 3 times daily as needed for Pain    Type 2 diabetes mellitus without complication, with long-term current use of insulin [facility])  Comments:  Ophthalmology referral  Orders:  -     POCT glycosylated hemoglobin (Hb A1C)  -     insulin detemir (LEVEMIR FLEXTOUCH) 100 UNIT/ML injection pen; INJECT 25 UNITS INTO THE SKIN DAILY        Lab Results   Component Value Date    LABA1C 9.2 02/18/2019     Lab Results   Component Value Date    .3 02/18/2019        Madhav Rosales MD

## 2019-08-08 ENCOUNTER — OFFICE VISIT (OUTPATIENT)
Dept: INTERNAL MEDICINE CLINIC | Age: 69
End: 2019-08-08
Payer: MEDICARE

## 2019-08-08 VITALS
BODY MASS INDEX: 28.41 KG/M2 | RESPIRATION RATE: 18 BRPM | HEART RATE: 92 BPM | WEIGHT: 176 LBS | OXYGEN SATURATION: 98 %

## 2019-08-08 DIAGNOSIS — R21 RASH OF BODY: Primary | ICD-10-CM

## 2019-08-08 PROCEDURE — 4040F PNEUMOC VAC/ADMIN/RCVD: CPT | Performed by: NURSE PRACTITIONER

## 2019-08-08 PROCEDURE — 3017F COLORECTAL CA SCREEN DOC REV: CPT | Performed by: NURSE PRACTITIONER

## 2019-08-08 PROCEDURE — 99213 OFFICE O/P EST LOW 20 MIN: CPT | Performed by: NURSE PRACTITIONER

## 2019-08-08 PROCEDURE — 1090F PRES/ABSN URINE INCON ASSESS: CPT | Performed by: NURSE PRACTITIONER

## 2019-08-08 PROCEDURE — 1123F ACP DISCUSS/DSCN MKR DOCD: CPT | Performed by: NURSE PRACTITIONER

## 2019-08-08 PROCEDURE — G8419 CALC BMI OUT NRM PARAM NOF/U: HCPCS | Performed by: NURSE PRACTITIONER

## 2019-08-08 PROCEDURE — G8399 PT W/DXA RESULTS DOCUMENT: HCPCS | Performed by: NURSE PRACTITIONER

## 2019-08-08 PROCEDURE — G8427 DOCREV CUR MEDS BY ELIG CLIN: HCPCS | Performed by: NURSE PRACTITIONER

## 2019-08-08 PROCEDURE — 4004F PT TOBACCO SCREEN RCVD TLK: CPT | Performed by: NURSE PRACTITIONER

## 2019-08-08 PROCEDURE — G8598 ASA/ANTIPLAT THER USED: HCPCS | Performed by: NURSE PRACTITIONER

## 2019-08-08 RX ORDER — PREDNISONE 10 MG/1
TABLET ORAL
Qty: 40 TABLET | Refills: 0 | Status: SHIPPED | OUTPATIENT
Start: 2019-08-08 | End: 2019-08-19 | Stop reason: ALTCHOICE

## 2019-08-08 RX ORDER — TRIAMCINOLONE ACETONIDE 0.25 MG/G
CREAM TOPICAL
Qty: 80 G | Refills: 1 | Status: SHIPPED | OUTPATIENT
Start: 2019-08-08 | End: 2022-03-04

## 2019-08-08 ASSESSMENT — ENCOUNTER SYMPTOMS
RESPIRATORY NEGATIVE: 1
GASTROINTESTINAL NEGATIVE: 1

## 2019-08-19 ENCOUNTER — OFFICE VISIT (OUTPATIENT)
Dept: INTERNAL MEDICINE CLINIC | Age: 69
End: 2019-08-19
Payer: MEDICARE

## 2019-08-19 VITALS
BODY MASS INDEX: 28.12 KG/M2 | WEIGHT: 175 LBS | HEART RATE: 72 BPM | SYSTOLIC BLOOD PRESSURE: 122 MMHG | HEIGHT: 66 IN | DIASTOLIC BLOOD PRESSURE: 76 MMHG

## 2019-08-19 DIAGNOSIS — I10 ESSENTIAL HYPERTENSION: Primary | Chronic | ICD-10-CM

## 2019-08-19 DIAGNOSIS — E11.9 TYPE 2 DIABETES MELLITUS WITHOUT COMPLICATION, WITH LONG-TERM CURRENT USE OF INSULIN (HCC): Chronic | ICD-10-CM

## 2019-08-19 DIAGNOSIS — Z79.4 TYPE 2 DIABETES MELLITUS WITHOUT COMPLICATION, WITH LONG-TERM CURRENT USE OF INSULIN (HCC): Chronic | ICD-10-CM

## 2019-08-19 DIAGNOSIS — I10 ESSENTIAL HYPERTENSION: Chronic | ICD-10-CM

## 2019-08-19 DIAGNOSIS — L42 PITYRIASIS ROSEA: ICD-10-CM

## 2019-08-19 PROBLEM — I70.0 ATHEROSCLEROSIS OF ABDOMINAL AORTA (HCC): Chronic | Status: RESOLVED | Noted: 2018-02-21 | Resolved: 2019-08-19

## 2019-08-19 LAB
A/G RATIO: 1.8 (ref 1.1–2.2)
ALBUMIN SERPL-MCNC: 4.5 G/DL (ref 3.4–5)
ALP BLD-CCNC: 68 U/L (ref 40–129)
ALT SERPL-CCNC: 11 U/L (ref 10–40)
ANION GAP SERPL CALCULATED.3IONS-SCNC: 12 MMOL/L (ref 3–16)
AST SERPL-CCNC: 12 U/L (ref 15–37)
BASOPHILS ABSOLUTE: 0.1 K/UL (ref 0–0.2)
BASOPHILS RELATIVE PERCENT: 0.8 %
BILIRUB SERPL-MCNC: 0.4 MG/DL (ref 0–1)
BUN BLDV-MCNC: 23 MG/DL (ref 7–20)
CALCIUM SERPL-MCNC: 10.1 MG/DL (ref 8.3–10.6)
CHLORIDE BLD-SCNC: 104 MMOL/L (ref 99–110)
CHOLESTEROL, TOTAL: 215 MG/DL (ref 0–199)
CO2: 23 MMOL/L (ref 21–32)
CREAT SERPL-MCNC: 0.7 MG/DL (ref 0.6–1.2)
CREATININE URINE: 116.3 MG/DL (ref 28–259)
EOSINOPHILS ABSOLUTE: 0.2 K/UL (ref 0–0.6)
EOSINOPHILS RELATIVE PERCENT: 2.4 %
GFR AFRICAN AMERICAN: >60
GFR NON-AFRICAN AMERICAN: >60
GLOBULIN: 2.5 G/DL
GLUCOSE BLD-MCNC: 184 MG/DL (ref 70–99)
HCT VFR BLD CALC: 35.2 % (ref 36–48)
HDLC SERPL-MCNC: 78 MG/DL (ref 40–60)
HEMOGLOBIN: 11.3 G/DL (ref 12–16)
LDL CHOLESTEROL CALCULATED: 117 MG/DL
LYMPHOCYTES ABSOLUTE: 1.8 K/UL (ref 1–5.1)
LYMPHOCYTES RELATIVE PERCENT: 17.2 %
MCH RBC QN AUTO: 25.6 PG (ref 26–34)
MCHC RBC AUTO-ENTMCNC: 32.2 G/DL (ref 31–36)
MCV RBC AUTO: 79.6 FL (ref 80–100)
MICROALBUMIN UR-MCNC: 1.7 MG/DL
MICROALBUMIN/CREAT UR-RTO: 14.6 MG/G (ref 0–30)
MONOCYTES ABSOLUTE: 0.6 K/UL (ref 0–1.3)
MONOCYTES RELATIVE PERCENT: 6.1 %
NEUTROPHILS ABSOLUTE: 7.8 K/UL (ref 1.7–7.7)
NEUTROPHILS RELATIVE PERCENT: 73.5 %
PDW BLD-RTO: 16.1 % (ref 12.4–15.4)
PLATELET # BLD: 249 K/UL (ref 135–450)
PMV BLD AUTO: 10.7 FL (ref 5–10.5)
POTASSIUM SERPL-SCNC: 5.1 MMOL/L (ref 3.5–5.1)
RBC # BLD: 4.42 M/UL (ref 4–5.2)
SODIUM BLD-SCNC: 139 MMOL/L (ref 136–145)
T4 FREE: 1.1 NG/DL (ref 0.9–1.8)
TOTAL PROTEIN: 7 G/DL (ref 6.4–8.2)
TRIGL SERPL-MCNC: 101 MG/DL (ref 0–150)
TSH SERPL DL<=0.05 MIU/L-ACNC: 0.74 UIU/ML (ref 0.27–4.2)
VLDLC SERPL CALC-MCNC: 20 MG/DL
WBC # BLD: 10.5 K/UL (ref 4–11)

## 2019-08-19 PROCEDURE — 2022F DILAT RTA XM EVC RTNOPTHY: CPT | Performed by: INTERNAL MEDICINE

## 2019-08-19 PROCEDURE — 99214 OFFICE O/P EST MOD 30 MIN: CPT | Performed by: INTERNAL MEDICINE

## 2019-08-19 PROCEDURE — 4040F PNEUMOC VAC/ADMIN/RCVD: CPT | Performed by: INTERNAL MEDICINE

## 2019-08-19 PROCEDURE — 4004F PT TOBACCO SCREEN RCVD TLK: CPT | Performed by: INTERNAL MEDICINE

## 2019-08-19 PROCEDURE — 3045F PR MOST RECENT HEMOGLOBIN A1C LEVEL 7.0-9.0%: CPT | Performed by: INTERNAL MEDICINE

## 2019-08-19 PROCEDURE — G8598 ASA/ANTIPLAT THER USED: HCPCS | Performed by: INTERNAL MEDICINE

## 2019-08-19 PROCEDURE — 3017F COLORECTAL CA SCREEN DOC REV: CPT | Performed by: INTERNAL MEDICINE

## 2019-08-19 PROCEDURE — 1090F PRES/ABSN URINE INCON ASSESS: CPT | Performed by: INTERNAL MEDICINE

## 2019-08-19 PROCEDURE — G8419 CALC BMI OUT NRM PARAM NOF/U: HCPCS | Performed by: INTERNAL MEDICINE

## 2019-08-19 PROCEDURE — G8399 PT W/DXA RESULTS DOCUMENT: HCPCS | Performed by: INTERNAL MEDICINE

## 2019-08-19 PROCEDURE — 1123F ACP DISCUSS/DSCN MKR DOCD: CPT | Performed by: INTERNAL MEDICINE

## 2019-08-19 PROCEDURE — G8427 DOCREV CUR MEDS BY ELIG CLIN: HCPCS | Performed by: INTERNAL MEDICINE

## 2019-08-19 RX ORDER — ACYCLOVIR 200 MG/1
200 CAPSULE ORAL 3 TIMES DAILY
Qty: 50 CAPSULE | Refills: 0 | Status: SHIPPED | OUTPATIENT
Start: 2019-08-19 | End: 2019-08-29

## 2019-08-19 NOTE — PROGRESS NOTES
SUBJECTIVE:  Patient ID: Elsi Cabrera is a 77 y.o. y.o. female     HPI    Elsi Cabrera returns for follow up of hypertension. Patient has been taking Her medications as prescribed. Patient's blood pressure is  controlled. Side effects related to taking the medications include no medication side effects noted    Patient returns to the office for follow up of her diabetes. Her last hemoglobin A1c was 8.8. She iscompliant with her medications but admits to a lot of dietary noncompliance. Patient has no symptoms related to her condition such as blurred vision, slurred speech, chest pain orshortness of breath. Review of Systems   Endocrine:        Low blood sugar   Skin: Positive for rash. OBJECTIVE:    /76   Pulse 72   Ht 5' 6\" (1.676 m)   Wt 175 lb (79.4 kg)   BMI 28.25 kg/m²      Physical Exam   Constitutional: She is oriented to person, place, and time. She appears well-developed and well-nourished. No distress. HENT:   Head: Normocephalic and atraumatic. Cardiovascular: Normal rate, regular rhythm, normal heart sounds and intact distal pulses. Exam reveals no gallop and no friction rub. No murmur heard. Pulmonary/Chest: Effort normal and breath sounds normal. No stridor. No respiratory distress. She has no wheezes. She has no rales. She exhibits no tenderness. Musculoskeletal: She exhibits no edema. No calluses or open lesions   Neurological: She is alert and oriented to person, place, and time. No cranial nerve deficit. Normal sensation to microfilament bilaterally. Skin: Skin is warm. No rash noted. She is not diaphoretic. No erythema. Psychiatric: She has a normal mood and affect. Her behavior is normal. Judgment and thought content normal.   Nursing note and vitals reviewed. Current Outpatient Medications:     triamcinolone (KENALOG) 0.025 % cream, Apply topically 2 times daily. , Disp: 80 g, Rfl: 1    ibuprofen (ADVIL;MOTRIN) 600 MG tablet,

## 2019-08-20 LAB
ESTIMATED AVERAGE GLUCOSE: 203 MG/DL
HBA1C MFR BLD: 8.7 %

## 2019-09-17 ENCOUNTER — HOSPITAL ENCOUNTER (OUTPATIENT)
Dept: WOMENS IMAGING | Age: 69
Discharge: HOME | End: 2019-09-17
Payer: MEDICARE

## 2019-09-17 DIAGNOSIS — Z12.31 ENCOUNTER FOR SCREENING MAMMOGRAM FOR BREAST CANCER: ICD-10-CM

## 2019-09-17 PROCEDURE — 77067 SCR MAMMO BI INCL CAD: CPT

## 2019-09-20 DIAGNOSIS — F51.01 PRIMARY INSOMNIA: Chronic | ICD-10-CM

## 2019-09-20 RX ORDER — ZOLPIDEM TARTRATE 10 MG/1
TABLET ORAL
Qty: 90 TABLET | Refills: 1 | Status: SHIPPED | OUTPATIENT
Start: 2019-09-20 | End: 2019-10-20

## 2019-10-10 DIAGNOSIS — Z79.4 TYPE 2 DIABETES MELLITUS WITHOUT COMPLICATION, WITH LONG-TERM CURRENT USE OF INSULIN (HCC): ICD-10-CM

## 2019-10-10 DIAGNOSIS — E11.9 TYPE 2 DIABETES MELLITUS WITHOUT COMPLICATION, WITH LONG-TERM CURRENT USE OF INSULIN (HCC): ICD-10-CM

## 2019-10-10 RX ORDER — PEN NEEDLE, DIABETIC 32GX 5/32"
NEEDLE, DISPOSABLE MISCELLANEOUS
Qty: 100 EACH | Refills: 3 | Status: SHIPPED | OUTPATIENT
Start: 2019-10-10 | End: 2021-02-15

## 2019-10-28 ENCOUNTER — TELEPHONE (OUTPATIENT)
Dept: INTERNAL MEDICINE CLINIC | Age: 69
End: 2019-10-28

## 2019-11-18 ENCOUNTER — OFFICE VISIT (OUTPATIENT)
Dept: INTERNAL MEDICINE CLINIC | Age: 69
End: 2019-11-18
Payer: MEDICARE

## 2019-11-18 VITALS
BODY MASS INDEX: 28.61 KG/M2 | WEIGHT: 178 LBS | DIASTOLIC BLOOD PRESSURE: 76 MMHG | HEART RATE: 68 BPM | HEIGHT: 66 IN | SYSTOLIC BLOOD PRESSURE: 134 MMHG

## 2019-11-18 DIAGNOSIS — I10 ESSENTIAL HYPERTENSION: Chronic | ICD-10-CM

## 2019-11-18 DIAGNOSIS — E11.9 TYPE 2 DIABETES MELLITUS WITHOUT COMPLICATION, WITH LONG-TERM CURRENT USE OF INSULIN (HCC): Chronic | ICD-10-CM

## 2019-11-18 DIAGNOSIS — L42 PITYRIASIS ROSEA: Primary | ICD-10-CM

## 2019-11-18 DIAGNOSIS — Z79.4 TYPE 2 DIABETES MELLITUS WITHOUT COMPLICATION, WITH LONG-TERM CURRENT USE OF INSULIN (HCC): Chronic | ICD-10-CM

## 2019-11-18 DIAGNOSIS — Z23 NEED FOR INFLUENZA VACCINATION: ICD-10-CM

## 2019-11-18 PROCEDURE — G8427 DOCREV CUR MEDS BY ELIG CLIN: HCPCS | Performed by: INTERNAL MEDICINE

## 2019-11-18 PROCEDURE — 90653 IIV ADJUVANT VACCINE IM: CPT | Performed by: INTERNAL MEDICINE

## 2019-11-18 PROCEDURE — 1090F PRES/ABSN URINE INCON ASSESS: CPT | Performed by: INTERNAL MEDICINE

## 2019-11-18 PROCEDURE — 99213 OFFICE O/P EST LOW 20 MIN: CPT | Performed by: INTERNAL MEDICINE

## 2019-11-18 PROCEDURE — 4004F PT TOBACCO SCREEN RCVD TLK: CPT | Performed by: INTERNAL MEDICINE

## 2019-11-18 PROCEDURE — G8399 PT W/DXA RESULTS DOCUMENT: HCPCS | Performed by: INTERNAL MEDICINE

## 2019-11-18 PROCEDURE — G0008 ADMIN INFLUENZA VIRUS VAC: HCPCS | Performed by: INTERNAL MEDICINE

## 2019-11-18 PROCEDURE — 4040F PNEUMOC VAC/ADMIN/RCVD: CPT | Performed by: INTERNAL MEDICINE

## 2019-11-18 PROCEDURE — 3052F HG A1C>EQUAL 8.0%<EQUAL 9.0%: CPT | Performed by: INTERNAL MEDICINE

## 2019-11-18 PROCEDURE — G8417 CALC BMI ABV UP PARAM F/U: HCPCS | Performed by: INTERNAL MEDICINE

## 2019-11-18 PROCEDURE — G8482 FLU IMMUNIZE ORDER/ADMIN: HCPCS | Performed by: INTERNAL MEDICINE

## 2019-11-18 PROCEDURE — G8598 ASA/ANTIPLAT THER USED: HCPCS | Performed by: INTERNAL MEDICINE

## 2019-11-18 PROCEDURE — 2022F DILAT RTA XM EVC RTNOPTHY: CPT | Performed by: INTERNAL MEDICINE

## 2019-11-18 PROCEDURE — 1123F ACP DISCUSS/DSCN MKR DOCD: CPT | Performed by: INTERNAL MEDICINE

## 2019-11-18 PROCEDURE — 3017F COLORECTAL CA SCREEN DOC REV: CPT | Performed by: INTERNAL MEDICINE

## 2019-11-29 ENCOUNTER — CARE COORDINATION (OUTPATIENT)
Dept: CARE COORDINATION | Age: 69
End: 2019-11-29

## 2020-01-02 RX ORDER — HYDROCHLOROTHIAZIDE 12.5 MG/1
CAPSULE, GELATIN COATED ORAL
Qty: 90 CAPSULE | Refills: 3 | OUTPATIENT
Start: 2020-01-02

## 2020-01-02 RX ORDER — LOSARTAN POTASSIUM 100 MG/1
TABLET ORAL
Qty: 90 TABLET | Refills: 3 | OUTPATIENT
Start: 2020-01-02

## 2020-01-07 RX ORDER — LOSARTAN POTASSIUM 100 MG/1
TABLET ORAL
Qty: 90 TABLET | Refills: 3 | Status: SHIPPED | OUTPATIENT
Start: 2020-01-07 | End: 2020-12-07

## 2020-01-07 RX ORDER — HYDROCHLOROTHIAZIDE 12.5 MG/1
12.5 CAPSULE, GELATIN COATED ORAL EVERY MORNING
Qty: 90 CAPSULE | Refills: 3 | Status: SHIPPED | OUTPATIENT
Start: 2020-01-07 | End: 2020-12-07

## 2020-04-27 RX ORDER — BLOOD-GLUCOSE METER
KIT MISCELLANEOUS
Qty: 100 EACH | Refills: 1 | Status: SHIPPED | OUTPATIENT
Start: 2020-04-27 | End: 2020-11-20 | Stop reason: SDUPTHER

## 2020-08-28 ENCOUNTER — OFFICE VISIT (OUTPATIENT)
Dept: INTERNAL MEDICINE CLINIC | Age: 70
End: 2020-08-28
Payer: MEDICARE

## 2020-08-28 VITALS
HEIGHT: 66 IN | HEART RATE: 67 BPM | DIASTOLIC BLOOD PRESSURE: 82 MMHG | BODY MASS INDEX: 28.28 KG/M2 | TEMPERATURE: 97.4 F | SYSTOLIC BLOOD PRESSURE: 134 MMHG | WEIGHT: 176 LBS | OXYGEN SATURATION: 97 %

## 2020-08-28 DIAGNOSIS — I10 ESSENTIAL HYPERTENSION: ICD-10-CM

## 2020-08-28 DIAGNOSIS — E11.9 TYPE 2 DIABETES MELLITUS WITHOUT COMPLICATION, WITH LONG-TERM CURRENT USE OF INSULIN (HCC): ICD-10-CM

## 2020-08-28 DIAGNOSIS — Z79.4 TYPE 2 DIABETES MELLITUS WITHOUT COMPLICATION, WITH LONG-TERM CURRENT USE OF INSULIN (HCC): ICD-10-CM

## 2020-08-28 LAB
A/G RATIO: 2 (ref 1.1–2.2)
ALBUMIN SERPL-MCNC: 4.7 G/DL (ref 3.4–5)
ALP BLD-CCNC: 71 U/L (ref 40–129)
ALT SERPL-CCNC: 12 U/L (ref 10–40)
ANION GAP SERPL CALCULATED.3IONS-SCNC: 14 MMOL/L (ref 3–16)
AST SERPL-CCNC: 15 U/L (ref 15–37)
BASOPHILS ABSOLUTE: 0.1 K/UL (ref 0–0.2)
BASOPHILS RELATIVE PERCENT: 1.2 %
BILIRUB SERPL-MCNC: 0.4 MG/DL (ref 0–1)
BUN BLDV-MCNC: 21 MG/DL (ref 7–20)
CALCIUM SERPL-MCNC: 9.7 MG/DL (ref 8.3–10.6)
CHLORIDE BLD-SCNC: 106 MMOL/L (ref 99–110)
CHOLESTEROL, TOTAL: 232 MG/DL (ref 0–199)
CO2: 21 MMOL/L (ref 21–32)
CREAT SERPL-MCNC: 0.8 MG/DL (ref 0.6–1.2)
CREATININE URINE: 146.5 MG/DL (ref 28–259)
EOSINOPHILS ABSOLUTE: 0.3 K/UL (ref 0–0.6)
EOSINOPHILS RELATIVE PERCENT: 3.1 %
GFR AFRICAN AMERICAN: >60
GFR NON-AFRICAN AMERICAN: >60
GLOBULIN: 2.3 G/DL
GLUCOSE BLD-MCNC: 124 MG/DL (ref 70–99)
HBA1C MFR BLD: 7.8 %
HCT VFR BLD CALC: 36.4 % (ref 36–48)
HDLC SERPL-MCNC: 79 MG/DL (ref 40–60)
HEMOGLOBIN: 11.6 G/DL (ref 12–16)
LDL CHOLESTEROL CALCULATED: 132 MG/DL
LYMPHOCYTES ABSOLUTE: 2.5 K/UL (ref 1–5.1)
LYMPHOCYTES RELATIVE PERCENT: 24.4 %
MCH RBC QN AUTO: 25.1 PG (ref 26–34)
MCHC RBC AUTO-ENTMCNC: 32 G/DL (ref 31–36)
MCV RBC AUTO: 78.5 FL (ref 80–100)
MICROALBUMIN UR-MCNC: <1.2 MG/DL
MICROALBUMIN/CREAT UR-RTO: NORMAL MG/G (ref 0–30)
MONOCYTES ABSOLUTE: 0.6 K/UL (ref 0–1.3)
MONOCYTES RELATIVE PERCENT: 6.3 %
NEUTROPHILS ABSOLUTE: 6.7 K/UL (ref 1.7–7.7)
NEUTROPHILS RELATIVE PERCENT: 65 %
PDW BLD-RTO: 16.2 % (ref 12.4–15.4)
PLATELET # BLD: 277 K/UL (ref 135–450)
PMV BLD AUTO: 10.4 FL (ref 5–10.5)
POTASSIUM SERPL-SCNC: 4.5 MMOL/L (ref 3.5–5.1)
RBC # BLD: 4.63 M/UL (ref 4–5.2)
SODIUM BLD-SCNC: 141 MMOL/L (ref 136–145)
T4 FREE: 1.1 NG/DL (ref 0.9–1.8)
TOTAL PROTEIN: 7 G/DL (ref 6.4–8.2)
TRIGL SERPL-MCNC: 107 MG/DL (ref 0–150)
TSH SERPL DL<=0.05 MIU/L-ACNC: 0.63 UIU/ML (ref 0.27–4.2)
VLDLC SERPL CALC-MCNC: 21 MG/DL
WBC # BLD: 10.4 K/UL (ref 4–11)

## 2020-08-28 PROCEDURE — 4040F PNEUMOC VAC/ADMIN/RCVD: CPT | Performed by: INTERNAL MEDICINE

## 2020-08-28 PROCEDURE — 3051F HG A1C>EQUAL 7.0%<8.0%: CPT | Performed by: INTERNAL MEDICINE

## 2020-08-28 PROCEDURE — 3017F COLORECTAL CA SCREEN DOC REV: CPT | Performed by: INTERNAL MEDICINE

## 2020-08-28 PROCEDURE — G0438 PPPS, INITIAL VISIT: HCPCS | Performed by: INTERNAL MEDICINE

## 2020-08-28 PROCEDURE — 99497 ADVNCD CARE PLAN 30 MIN: CPT | Performed by: INTERNAL MEDICINE

## 2020-08-28 PROCEDURE — 83036 HEMOGLOBIN GLYCOSYLATED A1C: CPT | Performed by: INTERNAL MEDICINE

## 2020-08-28 PROCEDURE — 1123F ACP DISCUSS/DSCN MKR DOCD: CPT | Performed by: INTERNAL MEDICINE

## 2020-08-28 ASSESSMENT — LIFESTYLE VARIABLES
HOW OFTEN DURING THE LAST YEAR HAVE YOU BEEN UNABLE TO REMEMBER WHAT HAPPENED THE NIGHT BEFORE BECAUSE YOU HAD BEEN DRINKING: 0
AUDIT-C TOTAL SCORE: 1
HOW OFTEN DURING THE LAST YEAR HAVE YOU HAD A FEELING OF GUILT OR REMORSE AFTER DRINKING: 0
HOW OFTEN DURING THE LAST YEAR HAVE YOU NEEDED AN ALCOHOLIC DRINK FIRST THING IN THE MORNING TO GET YOURSELF GOING AFTER A NIGHT OF HEAVY DRINKING: 0
HAS A RELATIVE, FRIEND, DOCTOR, OR ANOTHER HEALTH PROFESSIONAL EXPRESSED CONCERN ABOUT YOUR DRINKING OR SUGGESTED YOU CUT DOWN: 0
HAVE YOU OR SOMEONE ELSE BEEN INJURED AS A RESULT OF YOUR DRINKING: 0
HOW OFTEN DO YOU HAVE A DRINK CONTAINING ALCOHOL: 1
HOW OFTEN DURING THE LAST YEAR HAVE YOU FAILED TO DO WHAT WAS NORMALLY EXPECTED FROM YOU BECAUSE OF DRINKING: 0
AUDIT TOTAL SCORE: 1
HOW OFTEN DO YOU HAVE SIX OR MORE DRINKS ON ONE OCCASION: 0
HOW OFTEN DURING THE LAST YEAR HAVE YOU FOUND THAT YOU WERE NOT ABLE TO STOP DRINKING ONCE YOU HAD STARTED: 0
HOW MANY STANDARD DRINKS CONTAINING ALCOHOL DO YOU HAVE ON A TYPICAL DAY: 0

## 2020-08-28 ASSESSMENT — PATIENT HEALTH QUESTIONNAIRE - PHQ9
SUM OF ALL RESPONSES TO PHQ QUESTIONS 1-9: 0
SUM OF ALL RESPONSES TO PHQ QUESTIONS 1-9: 0

## 2020-08-28 NOTE — PROGRESS NOTES
Medicare Annual Wellness Visit  Name: Grazyna Velasco Date: 2020   MRN: [id number] Sex: Female   Age: 72 y.o. Ethnicity: Non-/Non    : 1950 Race: Landy Olson is here for [email or web address]    Screenings for behavioral, psychosocial and functional/safety risks, and cognitive dysfunction are all negative except as indicated below. These results, as well as other patient data from the [address] form, are documented in Flowsheets linked to this Encounter. No Known Allergies    Prior to Visit Medications    Medication Sig Taking? Authorizing Provider   metFORMIN (GLUCOPHAGE) 1000 MG tablet TAKE 1 TABLET TWICE DAILY  WITH MEALS Yes Kacy Fernandez MD   insulin detemir (LEVEMIR FLEXTOUCH) 100 UNIT/ML injection pen INJECT 25 UNITS SUBCUTANEOUSLY ONCE DAILY Yes Kacy Fernandez MD   blood glucose test strips (FREESTYLE LITE) strip USE TO CHECK GLUCOSE THREE TIMES DAILY DX:E11.9 Yes Kacy Fernandez MD   ibuprofen (ADVIL;MOTRIN) 600 MG tablet TAKE 1 TABLET BY MOUTH THREE TIMES DAILY AS NEEDED FOR PAIN Yes Kacy Fernandez MD   losartan (COZAAR) 100 MG tablet TAKE 1 TABLET DAILY Yes Kacy Fernandez MD   hydrochlorothiazide (MICROZIDE) 12.5 MG capsule Take 1 capsule by mouth every morning Yes Kacy Fernandez MD   RELION PEN NEEDLES 32G X 4 MM MISC USE ONE ONCE DAILY Yes Kacy Fernandez MD   insulin glargine (LANTUS SOLOSTAR) 100 UNIT/ML injection pen Inject 25 Units into the skin nightly Yes Kacy Fernandez MD   triamcinolone (KENALOG) 0.025 % cream Apply topically 2 times daily.  Yes LAWANDA Morris CNP   albuterol sulfate HFA (PROVENTIL HFA) 108 (90 Base) MCG/ACT inhaler Inhale 2 puffs into the lungs every 6 hours as needed for Wheezing Yes Kacy Fernandez MD   Calcium Carb-Cholecalciferol (OYSCO D) 250-125 MG-UNIT TABS Take 1 tablet by mouth 2 times daily Yes Kacy Fernandez MD   Insulin Syringes, Disposable, U-100 1 ML MISC 1 each by Does not rhonchi, normal air movement, no respiratory distress  Cardiovascular: normal rate, normal S1 and S2, no gallops, intact distal pulses and no carotid bruits  Abdomen: soft, non-tender, non-distended, normal bowel sounds, no masses or organomegaly  Extremities: no cyanosis and no clubbing    Patient's complete Health Risk Assessment and screening values have been reviewed and are found in Flowsheets. The following problems were reviewed today and where indicated follow up appointments were made and/or referrals ordered.     Positive Risk Factor Screenings with Interventions:     Substance Abuse:  Social History     Tobacco History     Smoking Status  Current Every Day Smoker Smoking Frequency  0.75 packs/day for 20 years (15 pk yrs) Smoking Tobacco Type  Cigarettes    Smokeless Tobacco Use  Never Used          Alcohol History     Alcohol Use Status  No          Drug Use     Drug Use Status  Not Asked          Sexual Activity     Sexually Active  Not Asked               Audit Questionnaire: Screen for Alcohol Misuse  How often do you have a drink containing alcohol?: Monthly or less  How many standard drinks containing alcohol do you have on a typical day when drinking?: One or two  How often do you have six or more drinks on one occasion?: Never  Audit-C Score: 1  During the past year, how often have you found that you were not able to stop drinking once you had started?: Never  During the past year, how often have you failed to do what was normally expected of you because of drinking?: Never  During the past year, how often have you needed a drink in the morning to get yourself going after a heavy drinking session?: Never  During the past year, how often have you had a feeling of guilt or remorse after drinking?: Never  During the past year, have you been unable to remember what happened the night before because you had been drinking?: Never  Have you or someone else been injured as a result of your drinking?: Diabetic microalbuminuria test  08/19/2020    Potassium monitoring  08/19/2020    Creatinine monitoring  08/19/2020    Lipid screen  08/19/2020    Flu vaccine (1) 09/01/2020    Diabetic foot exam  11/18/2020    Breast cancer screen  09/17/2021    DTaP/Tdap/Td vaccine (2 - Td) 04/09/2023    Colon cancer screen colonoscopy  08/20/2028    DEXA (modify frequency per FRAX score)  Completed    Pneumococcal 65+ years Vaccine  Completed    Hepatitis C screen  Completed    Hepatitis A vaccine  Aged Out    Hib vaccine  Aged Out    Meningococcal (ACWY) vaccine  Aged Out     Recommendations for [employer] Due: see orders and patient instructions/AVS.  . Recommended screening schedule for the next 5-10 years is provided to the patient in written form: see Patient Instructions/AVS.    Daphne Becerra was seen today for medicare aw. Diagnoses and all orders for this visit:    Routine adult health maintenance    ACP (advance care planning)  -     [place] ADVANCED CARE PLAN FACE TO FACE, 1ST 30MIN [94338]    Type 2 diabetes mellitus without complication, with long-term current use of insulin (HCC)  -     metFORMIN (GLUCOPHAGE) 1000 MG tablet; TAKE 1 TABLET TWICE DAILY  WITH MEALS    Essential hypertension    Chronic obstructive pulmonary disease, unspecified COPD type ([facility] [address]    Routine general medical examination at a health care facility        [address] [facility] (ACP) Physician/NP/PA (Provider) Lisa Huang      Date of ACP Conversation: 8/28/2020    Conversation Conducted with:   Patient with [address]:    Current Designated Health Care Decision Maker:      Note: Assess and validate information in current ACP documents, as indicated. If no Authorized Decision Maker has previously been identified, then patient chooses [place]:  \"Who would you like to name as your primary health care decision-maker? \"             Name: resuscitate) or would you prefer a natural death (answer \"no\" for do not attempt to resuscitate)? \"   yes     NOTE: If the patient has a valid advance directive AND provides care preference(s) that are inconsistent with that prior directive, advise the patient to consider either: creating a new advance directive that complies with state-specific requirements; or, if that is not possible, orally revoking that prior directive in accordance with state-specific requirements, which must be documented in the EHR.     Conversation Outcomes / Follow-Up Plan:   Recommended completion of Advance Directive      Length of Voluntary ACP Conversation in minutes:  20 minutes    Zaid Kc

## 2020-09-11 RX ORDER — ZOLPIDEM TARTRATE 10 MG/1
TABLET ORAL
Qty: 90 TABLET | Refills: 1 | Status: SHIPPED | OUTPATIENT
Start: 2020-09-11 | End: 2021-03-08 | Stop reason: SDUPTHER

## 2020-09-16 ENCOUNTER — TELEPHONE (OUTPATIENT)
Dept: INTERNAL MEDICINE CLINIC | Age: 70
End: 2020-09-16

## 2020-09-16 NOTE — TELEPHONE ENCOUNTER
ECC received a call from: [facility] [place]  Name of Caller: Angie  Relationship to patient: Pharmacy  Organization name: [address] [place] [address] contact number: [phone]  Reason for call: Prescription request for diabetic supplies.  Fax was sent 9/15/2020 for this request.

## 2020-09-24 ENCOUNTER — TELEPHONE (OUTPATIENT)
Dept: INTERNAL MEDICINE CLINIC | Age: 70
End: 2020-09-24

## 2020-09-24 NOTE — TELEPHONE ENCOUNTER
ECC received a call from:    Name of Caller: Nik Grand Leela joseph     Relationship to patient: no relation     Organization name:[employer]     Best contact numbermain line #[phone]  Fax # [phone]       Reason for call: Reason for call : they  Faxed over on 9/21 for the diabetic medical supplies . And they would like to know the status on this request . Please advise .  Thank you

## 2020-09-25 NOTE — TELEPHONE ENCOUNTER
Pt did not authorize [employer] to request DM supplies for her. They called her several days ago and she ended up hanging up on them.

## 2020-10-09 ENCOUNTER — TELEPHONE (OUTPATIENT)
Dept: INTERNAL MEDICINE CLINIC | Age: 70
End: 2020-10-09

## 2020-10-09 RX ORDER — BLOOD-GLUCOSE METER
1 KIT MISCELLANEOUS DAILY
Qty: 1 DEVICE | Refills: 0 | Status: SHIPPED | OUTPATIENT
Start: 2020-10-09 | End: 2020-11-16

## 2020-10-09 NOTE — TELEPHONE ENCOUNTER
Patient called and stated that her glucose monitor broke and she would like a new one to be sent to her local pharmacy, [facility] on [address]

## 2020-10-15 ENCOUNTER — HOSPITAL ENCOUNTER (OUTPATIENT)
Dept: WOMENS IMAGING | Age: 70
Discharge: HOME | End: 2020-10-15
Payer: MEDICARE

## 2020-10-15 PROCEDURE — 77063 BREAST TOMOSYNTHESIS BI: CPT

## 2020-11-20 RX ORDER — BLOOD-GLUCOSE METER
KIT MISCELLANEOUS
Qty: 100 EACH | Refills: 1 | Status: SHIPPED | OUTPATIENT
Start: 2020-11-20 | End: 2021-05-18

## 2021-01-08 ENCOUNTER — OFFICE VISIT (OUTPATIENT)
Dept: INTERNAL MEDICINE CLINIC | Age: 71
End: 2021-01-08
Payer: MEDICARE

## 2021-01-08 VITALS
DIASTOLIC BLOOD PRESSURE: 78 MMHG | BODY MASS INDEX: 28.77 KG/M2 | HEART RATE: 72 BPM | HEIGHT: 66 IN | SYSTOLIC BLOOD PRESSURE: 134 MMHG | OXYGEN SATURATION: 99 % | TEMPERATURE: 97.1 F | WEIGHT: 179 LBS

## 2021-01-08 DIAGNOSIS — E78.5 HYPERLIPIDEMIA ASSOCIATED WITH TYPE 2 DIABETES MELLITUS (HCC): ICD-10-CM

## 2021-01-08 DIAGNOSIS — Z79.4 CONTROLLED TYPE 2 DIABETES MELLITUS WITH COMPLICATION, WITH LONG-TERM CURRENT USE OF INSULIN (HCC): ICD-10-CM

## 2021-01-08 DIAGNOSIS — I10 DIABETES MELLITUS WITH COINCIDENT HYPERTENSION (HCC): ICD-10-CM

## 2021-01-08 DIAGNOSIS — E11.8 CONTROLLED TYPE 2 DIABETES MELLITUS WITH COMPLICATION, WITH LONG-TERM CURRENT USE OF INSULIN (HCC): ICD-10-CM

## 2021-01-08 DIAGNOSIS — E11.9 DIABETES MELLITUS WITH COINCIDENT HYPERTENSION (HCC): ICD-10-CM

## 2021-01-08 DIAGNOSIS — E11.69 HYPERLIPIDEMIA ASSOCIATED WITH TYPE 2 DIABETES MELLITUS (HCC): ICD-10-CM

## 2021-01-08 DIAGNOSIS — J44.9 CHRONIC OBSTRUCTIVE PULMONARY DISEASE, UNSPECIFIED COPD TYPE (HCC): Primary | Chronic | ICD-10-CM

## 2021-01-08 DIAGNOSIS — I70.0 THORACIC AORTA ATHEROSCLEROSIS (HCC): ICD-10-CM

## 2021-01-08 PROCEDURE — G8484 FLU IMMUNIZE NO ADMIN: HCPCS | Performed by: INTERNAL MEDICINE

## 2021-01-08 PROCEDURE — 3017F COLORECTAL CA SCREEN DOC REV: CPT | Performed by: INTERNAL MEDICINE

## 2021-01-08 PROCEDURE — G8926 SPIRO NO PERF OR DOC: HCPCS | Performed by: INTERNAL MEDICINE

## 2021-01-08 PROCEDURE — 3023F SPIROM DOC REV: CPT | Performed by: INTERNAL MEDICINE

## 2021-01-08 PROCEDURE — G8417 CALC BMI ABV UP PARAM F/U: HCPCS | Performed by: INTERNAL MEDICINE

## 2021-01-08 PROCEDURE — 99214 OFFICE O/P EST MOD 30 MIN: CPT | Performed by: INTERNAL MEDICINE

## 2021-01-08 PROCEDURE — 2022F DILAT RTA XM EVC RTNOPTHY: CPT | Performed by: INTERNAL MEDICINE

## 2021-01-08 PROCEDURE — 1090F PRES/ABSN URINE INCON ASSESS: CPT | Performed by: INTERNAL MEDICINE

## 2021-01-08 PROCEDURE — 1123F ACP DISCUSS/DSCN MKR DOCD: CPT | Performed by: INTERNAL MEDICINE

## 2021-01-08 PROCEDURE — 3046F HEMOGLOBIN A1C LEVEL >9.0%: CPT | Performed by: INTERNAL MEDICINE

## 2021-01-08 PROCEDURE — 4040F PNEUMOC VAC/ADMIN/RCVD: CPT | Performed by: INTERNAL MEDICINE

## 2021-01-08 PROCEDURE — G8399 PT W/DXA RESULTS DOCUMENT: HCPCS | Performed by: INTERNAL MEDICINE

## 2021-01-08 PROCEDURE — G8427 DOCREV CUR MEDS BY ELIG CLIN: HCPCS | Performed by: INTERNAL MEDICINE

## 2021-01-08 PROCEDURE — 4004F PT TOBACCO SCREEN RCVD TLK: CPT | Performed by: INTERNAL MEDICINE

## 2021-01-08 SDOH — ECONOMIC STABILITY: INCOME INSECURITY: HOW HARD IS IT FOR YOU TO PAY FOR THE VERY BASICS LIKE FOOD, HOUSING, MEDICAL CARE, AND HEATING?: NOT HARD AT ALL

## 2021-01-08 SDOH — ECONOMIC STABILITY: TRANSPORTATION INSECURITY
IN THE PAST 12 MONTHS, HAS THE LACK OF TRANSPORTATION KEPT YOU FROM MEDICAL APPOINTMENTS OR FROM GETTING MEDICATIONS?: NO

## 2021-01-08 SDOH — ECONOMIC STABILITY: FOOD INSECURITY: WITHIN THE PAST 12 MONTHS, THE FOOD YOU BOUGHT JUST DIDN'T LAST AND YOU DIDN'T HAVE MONEY TO GET MORE.: NEVER TRUE

## 2021-01-08 ASSESSMENT — PATIENT HEALTH QUESTIONNAIRE - PHQ9
SUM OF ALL RESPONSES TO PHQ QUESTIONS 1-9: 0
SUM OF ALL RESPONSES TO PHQ QUESTIONS 1-9: 0
SUM OF ALL RESPONSES TO PHQ9 QUESTIONS 1 & 2: 0

## 2021-01-08 NOTE — PROGRESS NOTES
PHQ Scores 1/8/2021 8/28/2020 2/18/2019 8/21/2018 8/22/2017   PHQ2 Score 0 0 0 0 0   PHQ9 Score 0 0 0 0 0     Interpretation of Total Score Depression Severity: 1-4 = Minimal depression, 5-9 = Mild depression, 10-14 = Moderate depression, 15-19 = Moderately severe depression, 20-27 = Severe depression  Vitals:    01/08/21 0926   BP: 134/78   Pulse: 72   Temp: 97.1 °F (36.2 °C)   SpO2: 99%     The 10-year ASCVD risk score (Tarun Mackey, et al., 2013) is: 52.1%    Values used to calculate the score:      Age: 72 years      Sex: Female      Is Non- : Yes      Diabetic: Yes      Tobacco smoker: Yes      Systolic Blood Pressure: 695 mmHg      Is BP treated: Yes      HDL Cholesterol: 79 mg/dL      Total Cholesterol: 232 mg/dL    Assessment/Plan:  Aracelis was seen today for diabetes and hypertension. Diagnoses and all orders for this visit:    Chronic obstructive pulmonary disease, unspecified COPD type ([facility] [address]  Comments:  smoking cessation     Hyperlipidemia associated with type 2 diabetes mellitus ([address]  -      DIABETES FOOT EXAM  -     External Referral To Ophthalmology    Controlled type 2 diabetes mellitus with complication, with long-term current use of insulin ([address]  -      DIABETES FOOT EXAM  -     External Referral To Ophthalmology    Diabetes mellitus with coincident hypertension ([address]  -      DIABETES FOOT EXAM  -     External Referral To Ophthalmology    Thoracic aorta atherosclerosis ([facility] [address]  Comments:  Observation at this.         Modesta Otoole MD  FACP

## 2021-01-09 LAB
ESTIMATED AVERAGE GLUCOSE: 182.9 MG/DL
HBA1C MFR BLD: 8 %

## 2021-03-08 DIAGNOSIS — Z79.4 TYPE 2 DIABETES MELLITUS WITHOUT COMPLICATION, WITH LONG-TERM CURRENT USE OF INSULIN (HCC): ICD-10-CM

## 2021-03-08 DIAGNOSIS — F51.01 PRIMARY INSOMNIA: ICD-10-CM

## 2021-03-08 DIAGNOSIS — E11.9 TYPE 2 DIABETES MELLITUS WITHOUT COMPLICATION, WITH LONG-TERM CURRENT USE OF INSULIN (HCC): ICD-10-CM

## 2021-03-08 RX ORDER — ZOLPIDEM TARTRATE 10 MG/1
TABLET ORAL
Qty: 90 TABLET | Refills: 1 | Status: SHIPPED | OUTPATIENT
Start: 2021-03-08 | End: 2021-06-08

## 2021-03-08 RX ORDER — HYDROCHLOROTHIAZIDE 12.5 MG/1
CAPSULE, GELATIN COATED ORAL
Qty: 90 CAPSULE | Refills: 1 | Status: SHIPPED | OUTPATIENT
Start: 2021-03-08 | End: 2021-10-12

## 2021-05-18 RX ORDER — BLOOD-GLUCOSE METER
KIT MISCELLANEOUS
Qty: 100 EACH | Refills: 1 | Status: SHIPPED | OUTPATIENT
Start: 2021-05-18 | End: 2021-12-10

## 2021-05-18 NOTE — TELEPHONE ENCOUNTER
Please Advise    A new script needs to be sent over for the test strips with the diagnose code on it so medicare will cover it

## 2021-06-04 ENCOUNTER — TELEPHONE (OUTPATIENT)
Dept: INTERNAL MEDICINE CLINIC | Age: 71
End: 2021-06-04

## 2021-09-03 ENCOUNTER — OFFICE VISIT (OUTPATIENT)
Dept: INTERNAL MEDICINE CLINIC | Age: 71
End: 2021-09-03
Payer: MEDICARE

## 2021-09-03 VITALS
RESPIRATION RATE: 16 BRPM | SYSTOLIC BLOOD PRESSURE: 110 MMHG | WEIGHT: 176.4 LBS | HEART RATE: 70 BPM | DIASTOLIC BLOOD PRESSURE: 68 MMHG | OXYGEN SATURATION: 100 % | TEMPERATURE: 97.6 F | BODY MASS INDEX: 28.35 KG/M2 | HEIGHT: 66 IN

## 2021-09-03 DIAGNOSIS — E11.69 TYPE 2 DIABETES MELLITUS WITH HYPERLIPIDEMIA (HCC): ICD-10-CM

## 2021-09-03 DIAGNOSIS — E78.5 TYPE 2 DIABETES MELLITUS WITH HYPERLIPIDEMIA (HCC): ICD-10-CM

## 2021-09-03 DIAGNOSIS — F51.01 PRIMARY INSOMNIA: ICD-10-CM

## 2021-09-03 DIAGNOSIS — Z00.00 ROUTINE GENERAL MEDICAL EXAMINATION AT A HEALTH CARE FACILITY: Primary | ICD-10-CM

## 2021-09-03 DIAGNOSIS — J44.9 CHRONIC OBSTRUCTIVE PULMONARY DISEASE, UNSPECIFIED COPD TYPE (HCC): Chronic | ICD-10-CM

## 2021-09-03 DIAGNOSIS — E11.9 DIABETES MELLITUS WITH COINCIDENT HYPERTENSION (HCC): ICD-10-CM

## 2021-09-03 DIAGNOSIS — I10 DIABETES MELLITUS WITH COINCIDENT HYPERTENSION (HCC): ICD-10-CM

## 2021-09-03 PROCEDURE — 3052F HG A1C>EQUAL 8.0%<EQUAL 9.0%: CPT | Performed by: INTERNAL MEDICINE

## 2021-09-03 PROCEDURE — 3017F COLORECTAL CA SCREEN DOC REV: CPT | Performed by: INTERNAL MEDICINE

## 2021-09-03 PROCEDURE — 4040F PNEUMOC VAC/ADMIN/RCVD: CPT | Performed by: INTERNAL MEDICINE

## 2021-09-03 PROCEDURE — G0439 PPPS, SUBSEQ VISIT: HCPCS | Performed by: INTERNAL MEDICINE

## 2021-09-03 PROCEDURE — 1123F ACP DISCUSS/DSCN MKR DOCD: CPT | Performed by: INTERNAL MEDICINE

## 2021-09-03 RX ORDER — ZOLPIDEM TARTRATE 10 MG/1
10 TABLET ORAL NIGHTLY PRN
Qty: 90 TABLET | Refills: 1 | Status: SHIPPED | OUTPATIENT
Start: 2021-09-03 | End: 2022-01-05 | Stop reason: SDUPTHER

## 2021-09-03 RX ORDER — SEMAGLUTIDE 1.34 MG/ML
0.25 INJECTION, SOLUTION SUBCUTANEOUS WEEKLY
Qty: 1 PEN | Refills: 3 | Status: SHIPPED | OUTPATIENT
Start: 2021-09-03 | End: 2022-03-04

## 2021-09-03 ASSESSMENT — PATIENT HEALTH QUESTIONNAIRE - PHQ9
SUM OF ALL RESPONSES TO PHQ9 QUESTIONS 1 & 2: 0
SUM OF ALL RESPONSES TO PHQ QUESTIONS 1-9: 0
2. FEELING DOWN, DEPRESSED OR HOPELESS: 0
SUM OF ALL RESPONSES TO PHQ QUESTIONS 1-9: 0
SUM OF ALL RESPONSES TO PHQ QUESTIONS 1-9: 0
1. LITTLE INTEREST OR PLEASURE IN DOING THINGS: 0

## 2021-09-03 ASSESSMENT — LIFESTYLE VARIABLES: HOW OFTEN DO YOU HAVE A DRINK CONTAINING ALCOHOL: 0

## 2021-09-03 NOTE — PROGRESS NOTES
Medicare Annual Wellness Visit  Name: Michelle Garcia Date: 9/3/2021   MRN: [id number] Sex: Female   Age: 80 y.o. Ethnicity: Non- / Non    : 1950 Race: Black / Christopher Randall is here for [place] (annual  AWV)    Screenings for behavioral, psychosocial and functional/safety risks, and cognitive dysfunction are all negative except as indicated below. These results, as well as other patient data from the [address] form, are documented in Flowsheets linked to this Encounter. No Known Allergies    Prior to Visit Medications    Medication Sig Taking? Authorizing Provider   ibuprofen (ADVIL;MOTRIN) 600 MG tablet TAKE 1 TABLET BY MOUTH THREE TIMES DAILY AS NEEDED FOR PAIN Yes Vania Salter MD   losartan (COZAAR) 100 MG tablet TAKE 1 TABLET DAILY Yes Vania Salter MD   LEVEMIR FLEXTOUCH 100 UNIT/ML injection pen INJECT 25 UNITS SUBCUTANEOUSLY ONCE DAILY Yes Vania Salter MD   hydroCHLOROthiazide (MICROZIDE) 12.5 MG capsule TAKE 1 CAPSULE EVERY       MORNING Yes Vania Salter MD   metFORMIN (GLUCOPHAGE) 1000 MG tablet TAKE 1 TABLET TWICE DAILY  WITH MEALS Yes Vania Salter MD   insulin glargine (LANTUS SOLOSTAR) 100 UNIT/ML injection pen Inject 25 Units into the skin nightly Yes aVnia Salter MD   albuterol sulfate HFA (PROVENTIL HFA) 108 (90 Base) MCG/ACT inhaler Inhale 2 puffs into the lungs every 6 hours as needed for Wheezing Yes Vania Salter MD   Calcium Carb-Cholecalciferol (OYSCO D) 250-125 MG-UNIT TABS Take 1 tablet by mouth 2 times daily Yes Vania Salter MD   aspirin 81 MG tablet Take 81 mg by mouth daily.  Yes Historical Provider, MD   blood glucose test strips (FREESTYLE LITE) strip USE TO CHECK GLUCOSE THREE TIMES DAILY DX:E11.9  Vania Salter MD   RELION PEN NEEDLES 32G X 4 MM MISC USE ONE ONCE DAILY  Vania Salter MD   Blood Glucose Monitoring Suppl (Reyes Amour) Lendon Cumber USE AS Marietta Serrano MD triamcinolone (KENALOG) 0.025 % cream Apply topically 2 times daily. Patient not taking: Reported on 9/3/2021  LAWANDA Goode CNP   Insulin Syringes, Disposable, U-100 1 ML MISC 1 each by Does not apply route daily  Mansoor Hoover MD   glucose monitoring kit (FREESTYLE) monitoring kit 1 kit by Does not apply route daily as needed  Mansoor Hoover MD   FREESTYLE LANCETS MISC 1 each by Does not apply route daily  Mansoor Hoover MD   Blood Glucose Monitoring Suppl ([address]) W/DEVICE KIT 1 Device by Does not apply route. LAWANDA Galarza CNP       Past Medical History:   Diagnosis Date    Cataract 2015    History of acute sinusitis 09    last OV    Hypertension     Microcytic anemia     Rotator cuff syndrome of right shoulder 2014    Type II or unspecified type diabetes mellitus without mention of complication, not stated as uncontrolled        Past Surgical History:   Procedure Laterality Date     SECTION      HYSTERECTOMY      OVARIAN CYST REMOVAL         Family History   Problem Relation Age of Onset    Diabetes Father     Cancer Sister     Diabetes Sister        CareTeam (Including outside providers/suppliers regularly involved in providing care):   Patient Care Team:  Mansoor Hoover MD as PCP - General (Internal Medicine)  Mansoor Hoover MD as P[facility] - [facility] Empaneled Provider  Soiba Dodd RN as Registered Nurse (General Surgery)    Wt Readings from Last 3 Encounters:   21 176 lb 6.4 oz (80 kg)   21 179 lb (81.2 kg)   20 176 lb (79.8 kg)     Vitals:    21 0936   BP: 110/68   Pulse: 70   Resp: 16   Temp: 97.6 °F (36.4 °C)   TempSrc: Temporal   SpO2: 100%   Weight: 176 lb 6.4 oz (80 kg)   Height: 5' 6\" (1.676 m)     Body mass index is 28.47 kg/m². Based upon direct observation of the patient, evaluation of cognition reveals recent and remote memory intact. Physical Exam  Vitals reviewed.    Constitutional: General: She is not in acute distress. Appearance: She is well-developed. She is not diaphoretic. HENT:      Head: Normocephalic and atraumatic. Pulmonary:      Effort: Pulmonary effort is normal.   Neurological:      Mental Status: She is alert and oriented to person, place, and time. Cranial Nerves: No cranial nerve deficit. Psychiatric:         Behavior: Behavior normal.         Thought Content: Thought content normal.         Judgment: Judgment normal.           Patient's complete Health Risk Assessment and screening values have been reviewed and are found in Flowsheets. The following problems were reviewed today and where indicated follow up appointments were made and/or referrals ordered. Positive Risk Factor Screenings with Interventions:         Substance History:  Social History     Tobacco History     Smoking Status  Current Every Day Smoker Smoking Frequency  0.75 packs/day for 20 years (15 pk yrs) Smoking Tobacco Type  Cigarettes    Smokeless Tobacco Use  Never Used          Alcohol History     Alcohol Use Status  No          Drug Use     Drug Use Status  Not Asked          Sexual Activity     Sexually Active  Not Asked               Alcohol Screening:       A score of 8 or more is associated with harmful or hazardous drinking. A score of 13 or more in women, and 15 or more in men, is likely to indicate alcohol dependence. Substance Abuse Interventions:  · Tobacco abuse:  patient is not ready to work toward tobacco cessation at this time    [address] [facility] and ACP:  General  In general, how would you say your health is?: Excellent  In the past 7 days, have you experienced any of the following?  New or Increased Pain, New or Increased Fatigue, Loneliness, Social Isolation, Stress or Anger?: None of These  Do you get the social and emotional support that you need?: Yes  Do you have a Living Will?: (!) No  Advance Directives     Power of [address] [facility] ACP-Advance Directive ACP-Power Completed    Hepatitis C screen  Completed    Hepatitis A vaccine  Aged Out    Hib vaccine  Aged Out    Meningococcal (ACWY) vaccine  Aged Out     Recommendations for [employer] Due: see orders and patient instructions/AVS.  . Recommended screening schedule for the next 5-10 years is provided to the patient in written form: see Patient Instructions/AVS.    Adolfo Al was seen today for medicare awv. Diagnoses and all orders for this visit:    Routine general medical examination at a health care facility    Diabetes mellitus with coincident hypertension ([facility]  -     CBC Auto Differential; Future  -     Comprehensive Metabolic Panel; Future  -     Hemoglobin A1C; Future  -     Microalbumin / Creatinine Urine Ratio; Future  -     Lipid Panel; Future  -     T4, Free; Future  -     TSH without Reflex; Future  -      DIABETES FOOT EXAM  -     Semaglutide,0.25 or 0.5MG/DOS, (OZEMPIC, 0.25 OR 0.5 MG/DOSE,) 2 MG/1.5ML SOPN; Inject 0.25 mg into the skin once a week  -     External Referral To Ophthalmology    Type 2 diabetes mellitus with hyperlipidemia (HCC)  -     Hemoglobin A1C; Future  -     Microalbumin / Creatinine Urine Ratio; Future  -      DIABETES FOOT EXAM  -     Semaglutide,0.25 or 0.5MG/DOS, (OZEMPIC, 0.25 OR 0.5 MG/DOSE,) 2 MG/1.5ML SOPN; Inject 0.25 mg into the skin once a week  -     External Referral To Ophthalmology    Chronic obstructive pulmonary disease, unspecified COPD type ([facility]  -     Hemoglobin A1C; Future  -     Microalbumin / Creatinine Urine Ratio; Future    Primary insomnia  -     zolpidem (AMBIEN) 10 MG tablet; Take 1 tablet by mouth nightly as needed for Sleep for up to 90 days.            Saray Mcginnis MD  FACP

## 2021-09-03 NOTE — PATIENT INSTRUCTIONS
Personalized Preventive Plan for Sacha Garnica - 9/3/2021  Medicare offers a range of preventive health benefits. Some of the tests and screenings are paid in full while other may be subject to a deductible, co-insurance, and/or copay. Some of these benefits include a comprehensive review of your medical history including lifestyle, illnesses that may run in your family, and various assessments and screenings as appropriate. After reviewing your medical record and screening and assessments performed today your provider may have ordered immunizations, labs, imaging, and/or referrals for you. A list of these orders (if applicable) as well as your Preventive Care list are included within your After Visit Summary for your review. Other Preventive Recommendations:    · A preventive eye exam performed by an eye specialist is recommended every 1-2 years to screen for glaucoma; cataracts, macular degeneration, and other eye disorders. · A preventive dental visit is recommended every 6 months. · Try to get at least 150 minutes of exercise per week or 10,000 steps per day on a pedometer . · Order or download the FREE \"Exercise & Physical Activity: Your Everyday Guide\" from [employer]. Call 9-442.882.2136 or search [employer] online. · You need 4278-3535 mg of calcium and 2435-6884 IU of vitamin D per day. It is possible to meet your calcium requirement with diet alone, but a vitamin D supplement is usually necessary to meet this goal.  · When exposed to the sun, use a sunscreen that protects against both UVA and UVB radiation with an SPF of 30 or greater. Reapply every 2 to 3 hours or after sweating, drying off with a towel, or swimming. · Always wear a seat belt when traveling in a car. Always wear a helmet when riding a bicycle or motorcycle.

## 2021-09-08 DIAGNOSIS — D64.9 ANEMIA, UNSPECIFIED TYPE: ICD-10-CM

## 2021-09-08 DIAGNOSIS — E11.8 CONTROLLED TYPE 2 DIABETES MELLITUS WITH COMPLICATION, WITH LONG-TERM CURRENT USE OF INSULIN (HCC): Primary | ICD-10-CM

## 2021-09-08 DIAGNOSIS — Z79.4 CONTROLLED TYPE 2 DIABETES MELLITUS WITH COMPLICATION, WITH LONG-TERM CURRENT USE OF INSULIN (HCC): Primary | ICD-10-CM

## 2021-09-27 DIAGNOSIS — Z79.4 TYPE 2 DIABETES MELLITUS WITHOUT COMPLICATION, WITH LONG-TERM CURRENT USE OF INSULIN (HCC): Chronic | ICD-10-CM

## 2021-09-27 DIAGNOSIS — E11.9 TYPE 2 DIABETES MELLITUS WITHOUT COMPLICATION, WITH LONG-TERM CURRENT USE OF INSULIN (HCC): Chronic | ICD-10-CM

## 2021-09-27 RX ORDER — INSULIN DETEMIR 100 [IU]/ML
INJECTION, SOLUTION SUBCUTANEOUS
Qty: 15 ML | Refills: 0 | Status: SHIPPED | OUTPATIENT
Start: 2021-09-27 | End: 2021-11-15

## 2021-11-15 DIAGNOSIS — Z79.4 TYPE 2 DIABETES MELLITUS WITHOUT COMPLICATION, WITH LONG-TERM CURRENT USE OF INSULIN (HCC): Chronic | ICD-10-CM

## 2021-11-15 DIAGNOSIS — E11.9 TYPE 2 DIABETES MELLITUS WITHOUT COMPLICATION, WITH LONG-TERM CURRENT USE OF INSULIN (HCC): Chronic | ICD-10-CM

## 2021-11-15 RX ORDER — INSULIN DETEMIR 100 [IU]/ML
INJECTION, SOLUTION SUBCUTANEOUS
Qty: 15 ML | Refills: 0 | Status: SHIPPED | OUTPATIENT
Start: 2021-11-15 | End: 2022-01-07 | Stop reason: SDUPTHER

## 2021-12-01 NOTE — TELEPHONE ENCOUNTER
Tried calling [facility] several times, selecting different provider options. Call would drop or tell me to contact number on back of patient's card.

## 2021-12-03 NOTE — TELEPHONE ENCOUNTER
Called [facility] and spoke to Nicolás, some strengths were on back order. She checked on HCTZ 12.5 mg, they have it in stock and not sure why patient would have been told they didn't. They are resetting the script and getting it in process.

## 2022-01-05 ENCOUNTER — VIRTUAL VISIT (OUTPATIENT)
Dept: INTERNAL MEDICINE CLINIC | Age: 72
End: 2022-01-05
Payer: MEDICARE

## 2022-01-05 DIAGNOSIS — U07.1 COVID-19: Primary | ICD-10-CM

## 2022-01-05 DIAGNOSIS — F51.01 PRIMARY INSOMNIA: ICD-10-CM

## 2022-01-05 PROCEDURE — G8427 DOCREV CUR MEDS BY ELIG CLIN: HCPCS | Performed by: NURSE PRACTITIONER

## 2022-01-05 PROCEDURE — 1090F PRES/ABSN URINE INCON ASSESS: CPT | Performed by: NURSE PRACTITIONER

## 2022-01-05 PROCEDURE — 3017F COLORECTAL CA SCREEN DOC REV: CPT | Performed by: NURSE PRACTITIONER

## 2022-01-05 PROCEDURE — 4040F PNEUMOC VAC/ADMIN/RCVD: CPT | Performed by: NURSE PRACTITIONER

## 2022-01-05 PROCEDURE — 1123F ACP DISCUSS/DSCN MKR DOCD: CPT | Performed by: NURSE PRACTITIONER

## 2022-01-05 PROCEDURE — G8399 PT W/DXA RESULTS DOCUMENT: HCPCS | Performed by: NURSE PRACTITIONER

## 2022-01-05 PROCEDURE — 99213 OFFICE O/P EST LOW 20 MIN: CPT | Performed by: NURSE PRACTITIONER

## 2022-01-05 RX ORDER — ZOLPIDEM TARTRATE 10 MG/1
10 TABLET ORAL NIGHTLY PRN
Qty: 90 TABLET | Refills: 0 | Status: SHIPPED | OUTPATIENT
Start: 2022-01-05 | End: 2022-01-07 | Stop reason: SDUPTHER

## 2022-01-05 ASSESSMENT — ENCOUNTER SYMPTOMS
COUGH: 1
GASTROINTESTINAL NEGATIVE: 1

## 2022-01-05 NOTE — PROGRESS NOTES
2022    TELEHEALTH EVALUATION -- Audio/Visual (During VRVMK-06 public health emergency)    HPI: Diagnosed with COVID-19 yesterday, Has been vaccinated with Gregory Youngblood but has not received her Booster    Celangel Carballo (:  1950) has requested an audio/video evaluation for the following concern(s):    Cough    Review of Systems   Constitutional: Positive for fatigue. HENT: Negative. Respiratory: Positive for cough. Cardiovascular: Negative. Gastrointestinal: Negative. Endocrine: Negative. Genitourinary: Negative. Musculoskeletal: Negative. Neurological: Positive for headaches. Hematological: Negative. Psychiatric/Behavioral: Negative. Prior to Visit Medications    Medication Sig Taking? Authorizing Provider   zolpidem (AMBIEN) 10 MG tablet Take 1 tablet by mouth nightly as needed for Sleep for up to 90 days.  Yes Rosalio Turcios MD   blood glucose test strips (FREESTYLE LITE) strip USE 1 STRIP TO CHECK GLUCOSE THREE TIMES DAILY (E11.9 - ICD 10) Yes Rosalio Turcios MD   LEVEMIR FLEXTOUCH 100 UNIT/ML injection pen INJECT 25 UNITS SUBCUTANEOUSLY ONCE DAILY Yes Rosalio Turcios MD   ibuprofen (ADVIL;MOTRIN) 600 MG tablet TAKE 1 TABLET BY MOUTH THREE TIMES DAILY AS NEEDED FOR PAIN Yes Rosalio Turcios MD   losartan (COZAAR) 100 MG tablet TAKE 1 TABLET DAILY Yes Rosalio Turcios MD   metFORMIN (GLUCOPHAGE) 1000 MG tablet TAKE 1 TABLET TWICE DAILY  WITH MEALS Yes Rosalio Turcios MD   hydroCHLOROthiazide (MICROZIDE) 12.5 MG capsule TAKE 1 CAPSULE EVERY       MORNING Yes Rosalio Turcios MD   RELION PEN NEEDLES 32G X 4 MM MISC USE ONE ONCE DAILY Yes Rosalio Turcios MD   Blood Glucose Monitoring Suppl (FREESTYLE LITE) KERMIT USE AS DIRECTED Yes Rosalio Turcios MD   Insulin Syringes, Disposable, U-100 1 ML MISC 1 each by Does not apply route daily Yes Rosalio Turcios MD   glucose monitoring kit (FREESTYLE) monitoring kit 1 kit by Does not apply route daily as needed Yes Rosalio Turcios MD FREESTYLE LANCETS MISC 1 each by Does not apply route daily Yes Talita Mckinney MD   aspirin 81 MG tablet Take 81 mg by mouth daily. Yes Historical Provider, MD   Blood Glucose Monitoring Suppl ([address]) W/DEVICE KIT 1 Device by Does not apply route. Yes LAWANDA Mills CNP   Semaglutide,0.25 or 0.5MG/DOS, (OZEMPIC, 0.25 OR 0.5 MG/DOSE,) 2 MG/1.5ML SOPN Inject 0.25 mg into the skin once a week  Talita Mckinney MD   triamcinolone (KENALOG) 0.025 % cream Apply topically 2 times daily.   Patient not taking: Reported on 9/3/2021  Durwin Sandhoff, APRN - CNP   albuterol sulfate HFA (PROVENTIL HFA) 108 (90 Base) MCG/ACT inhaler Inhale 2 puffs into the lungs every 6 hours as needed for Wheezing  Patient not taking: Reported on 1/5/2022  Talita Mckinney MD   Calcium Carb-Cholecalciferol (OYSCO D) 250-125 MG-UNIT TABS Take 1 tablet by mouth 2 times daily  Patient not taking: Reported on 1/5/2022  Talita Mckinney MD       Social History     Tobacco Use    Smoking status: Current Every Day Smoker     Packs/day: 0.75     Years: 20.00     Pack years: 15.00     Types: Cigarettes    Smokeless tobacco: Never Used   Vaping Use    Vaping Use: Never used   Substance Use Topics    Alcohol use: No    Drug use: Never        No Known Allergies,   Past Medical History:   Diagnosis Date    Cataract 6/23/2015    COVID-19     History of acute sinusitis 12/04/09    last OV    Hypertension     Microcytic anemia     Rotator cuff syndrome of right shoulder 2/4/2014    Type II or unspecified type diabetes mellitus without mention of complication, not stated as uncontrolled    ,   Family History   Problem Relation Age of Onset    Diabetes Father     Cancer Sister     Diabetes Sister        PHYSICAL EXAMINATION:  [ INSTRUCTIONS:  \"[x]\" Indicates a positive item  \"[]\" Indicates a negative item  -- DELETE ALL ITEMS NOT EXAMINED]  Vital Signs: (As obtained by patient/caregiver or practitioner observation)    Blood pressure-  Heart rate-    Respiratory rate-    Temperature-  Pulse oximetry-     Constitutional: [x] Appears well-developed and well-nourished [] No apparent distress      [] Abnormal-   Mental status  [x] Alert and awake  [x] Oriented to person/place/time []Able to follow commands      Eyes:  EOM    []  Normal  [] Abnormal-  Sclera  []  Normal  [] Abnormal -         Discharge []  None visible  [] Abnormal -    HENT:   [x] Normocephalic, atraumatic. [] Abnormal   [] Mouth/Throat: Mucous membranes are moist.     External Ears [] Normal  [] Abnormal-     Neck: [] No visualized mass     Pulmonary/Chest: [x] Respiratory effort normal.  [] No visualized signs of difficulty breathing or respiratory distress        [x] Abnormal-      Musculoskeletal:   [] Normal gait with no signs of ataxia         [] Normal range of motion of neck        [] Abnormal-       Neurological:        [] No Facial Asymmetry (Cranial nerve 7 motor function) (limited exam to video visit)          [] No gaze palsy        [] Abnormal-         Skin:        [x] No significant exanthematous lesions or discoloration noted on facial skin         [] Abnormal-            Psychiatric:       [x] Normal Affect [] No Hallucinations        [] Abnormal-     Other pertinent observable physical exam findings-     ASSESSMENT/PLAN:  COVID-19    -Honey, one tsp to help ease the cough  -Gargle with warm salt and water daily  -Sit do to lie in bed, unless sleeping; otherwise sit in chair  -Need to isolate for 10 from time of symptoms    No follow-ups on file. Terese Duggan, was evaluated through a synchronous (real-time) audio-video encounter. The patient (or guardian if applicable) is aware that this is a billable service. Verbal consent to proceed has been obtained within the past 12 months.  The visit was conducted pursuant to the emergency declaration under the [address] waiver authority and the [employer] General Act. Patient identification was verified, and a caregiver was present when appropriate. The patient was located in a state where the provider was credentialed to provide care. Total time spent on this encounter: Not billed by time    --LAWANDA Perrin CNP on 1/5/2022 at 4:19 PM    An electronic signature was used to authenticate this note.

## 2022-01-07 ENCOUNTER — PATIENT MESSAGE (OUTPATIENT)
Dept: INTERNAL MEDICINE CLINIC | Age: 72
End: 2022-01-07

## 2022-01-07 DIAGNOSIS — F51.01 PRIMARY INSOMNIA: ICD-10-CM

## 2022-01-07 DIAGNOSIS — E11.9 TYPE 2 DIABETES MELLITUS WITHOUT COMPLICATION, WITH LONG-TERM CURRENT USE OF INSULIN (HCC): Chronic | ICD-10-CM

## 2022-01-07 DIAGNOSIS — Z79.4 TYPE 2 DIABETES MELLITUS WITHOUT COMPLICATION, WITH LONG-TERM CURRENT USE OF INSULIN (HCC): Chronic | ICD-10-CM

## 2022-01-07 RX ORDER — ZOLPIDEM TARTRATE 10 MG/1
10 TABLET ORAL NIGHTLY PRN
Qty: 90 TABLET | Refills: 0 | Status: SHIPPED | OUTPATIENT
Start: 2022-01-07 | End: 2022-04-08

## 2022-01-07 RX ORDER — INSULIN DETEMIR 100 [IU]/ML
INJECTION, SOLUTION SUBCUTANEOUS
Qty: 15 ML | Refills: 0 | Status: SHIPPED | OUTPATIENT
Start: 2022-01-07 | End: 2022-02-28

## 2022-01-07 RX ORDER — BLOOD-GLUCOSE METER
KIT MISCELLANEOUS
Qty: 100 EACH | Refills: 1 | Status: SHIPPED | OUTPATIENT
Start: 2022-01-07 | End: 2022-05-11

## 2022-01-07 RX ORDER — PEN NEEDLE, DIABETIC 32GX 5/32"
1 NEEDLE, DISPOSABLE MISCELLANEOUS 2 TIMES DAILY
Qty: 100 EACH | Refills: 2 | Status: SHIPPED | OUTPATIENT
Start: 2022-01-07 | End: 2022-07-06

## 2022-01-07 NOTE — TELEPHONE ENCOUNTER
Patient calling to check on status of reflll. [facility] didn't transfer the medication and she would like them to be sent to [facility].

## 2022-02-27 DIAGNOSIS — E11.9 TYPE 2 DIABETES MELLITUS WITHOUT COMPLICATION, WITH LONG-TERM CURRENT USE OF INSULIN (HCC): Chronic | ICD-10-CM

## 2022-02-27 DIAGNOSIS — Z79.4 TYPE 2 DIABETES MELLITUS WITHOUT COMPLICATION, WITH LONG-TERM CURRENT USE OF INSULIN (HCC): Chronic | ICD-10-CM

## 2022-02-28 RX ORDER — INSULIN DETEMIR 100 [IU]/ML
INJECTION, SOLUTION SUBCUTANEOUS
Qty: 15 ML | Refills: 1 | Status: SHIPPED
Start: 2022-02-28 | End: 2022-04-28

## 2022-03-04 ENCOUNTER — OFFICE VISIT (OUTPATIENT)
Dept: INTERNAL MEDICINE CLINIC | Age: 72
End: 2022-03-04
Payer: MEDICARE

## 2022-03-04 VITALS
TEMPERATURE: 97.1 F | SYSTOLIC BLOOD PRESSURE: 124 MMHG | BODY MASS INDEX: 28.25 KG/M2 | OXYGEN SATURATION: 98 % | WEIGHT: 175 LBS | DIASTOLIC BLOOD PRESSURE: 74 MMHG | HEART RATE: 68 BPM

## 2022-03-04 DIAGNOSIS — R51.9 POST-COVID CHRONIC HEADACHE: ICD-10-CM

## 2022-03-04 DIAGNOSIS — I25.10 ATHEROSCLEROSIS OF NATIVE CORONARY ARTERY OF NATIVE HEART WITHOUT ANGINA PECTORIS: ICD-10-CM

## 2022-03-04 DIAGNOSIS — E11.59 HYPERTENSION ASSOCIATED WITH DIABETES (HCC): ICD-10-CM

## 2022-03-04 DIAGNOSIS — E78.5 TYPE 2 DIABETES MELLITUS WITH HYPERLIPIDEMIA (HCC): ICD-10-CM

## 2022-03-04 DIAGNOSIS — J44.9 CHRONIC OBSTRUCTIVE PULMONARY DISEASE, UNSPECIFIED COPD TYPE (HCC): ICD-10-CM

## 2022-03-04 DIAGNOSIS — E11.69 TYPE 2 DIABETES MELLITUS WITH HYPERLIPIDEMIA (HCC): ICD-10-CM

## 2022-03-04 DIAGNOSIS — Z12.31 ENCOUNTER FOR SCREENING MAMMOGRAM FOR MALIGNANT NEOPLASM OF BREAST: ICD-10-CM

## 2022-03-04 DIAGNOSIS — U09.9 POST-COVID CHRONIC HEADACHE: ICD-10-CM

## 2022-03-04 DIAGNOSIS — I70.0 THORACIC AORTA ATHEROSCLEROSIS (HCC): Primary | ICD-10-CM

## 2022-03-04 DIAGNOSIS — G89.29 POST-COVID CHRONIC HEADACHE: ICD-10-CM

## 2022-03-04 DIAGNOSIS — I15.2 HYPERTENSION ASSOCIATED WITH DIABETES (HCC): ICD-10-CM

## 2022-03-04 PROCEDURE — 2022F DILAT RTA XM EVC RTNOPTHY: CPT | Performed by: INTERNAL MEDICINE

## 2022-03-04 PROCEDURE — 99214 OFFICE O/P EST MOD 30 MIN: CPT | Performed by: INTERNAL MEDICINE

## 2022-03-04 PROCEDURE — G8417 CALC BMI ABV UP PARAM F/U: HCPCS | Performed by: INTERNAL MEDICINE

## 2022-03-04 PROCEDURE — G8484 FLU IMMUNIZE NO ADMIN: HCPCS | Performed by: INTERNAL MEDICINE

## 2022-03-04 PROCEDURE — 3017F COLORECTAL CA SCREEN DOC REV: CPT | Performed by: INTERNAL MEDICINE

## 2022-03-04 PROCEDURE — 4004F PT TOBACCO SCREEN RCVD TLK: CPT | Performed by: INTERNAL MEDICINE

## 2022-03-04 PROCEDURE — 3046F HEMOGLOBIN A1C LEVEL >9.0%: CPT | Performed by: INTERNAL MEDICINE

## 2022-03-04 PROCEDURE — 1123F ACP DISCUSS/DSCN MKR DOCD: CPT | Performed by: INTERNAL MEDICINE

## 2022-03-04 PROCEDURE — G8399 PT W/DXA RESULTS DOCUMENT: HCPCS | Performed by: INTERNAL MEDICINE

## 2022-03-04 PROCEDURE — 1090F PRES/ABSN URINE INCON ASSESS: CPT | Performed by: INTERNAL MEDICINE

## 2022-03-04 PROCEDURE — 3023F SPIROM DOC REV: CPT | Performed by: INTERNAL MEDICINE

## 2022-03-04 PROCEDURE — 4040F PNEUMOC VAC/ADMIN/RCVD: CPT | Performed by: INTERNAL MEDICINE

## 2022-03-04 PROCEDURE — G8427 DOCREV CUR MEDS BY ELIG CLIN: HCPCS | Performed by: INTERNAL MEDICINE

## 2022-03-04 RX ORDER — BLOOD-GLUCOSE,RECEIVER,CONT
1 EACH MISCELLANEOUS CONTINUOUS
Qty: 1 EACH | Refills: 1 | Status: SHIPPED
Start: 2022-03-04 | End: 2022-03-30

## 2022-03-04 RX ORDER — LOSARTAN POTASSIUM 100 MG/1
100 TABLET ORAL DAILY
Qty: 90 TABLET | Refills: 3 | Status: SHIPPED | OUTPATIENT
Start: 2022-03-04 | End: 2022-03-11 | Stop reason: SDUPTHER

## 2022-03-04 RX ORDER — BLOOD-GLUCOSE SENSOR
2 EACH MISCELLANEOUS CONTINUOUS
Qty: 2 EACH | Refills: 3 | Status: SHIPPED
Start: 2022-03-04 | End: 2022-03-30

## 2022-03-04 RX ORDER — HYDROCHLOROTHIAZIDE 12.5 MG/1
12.5 CAPSULE, GELATIN COATED ORAL EVERY MORNING
Qty: 90 CAPSULE | Refills: 3 | Status: SHIPPED | OUTPATIENT
Start: 2022-03-04 | End: 2022-03-11 | Stop reason: SDUPTHER

## 2022-03-04 SDOH — ECONOMIC STABILITY: FOOD INSECURITY: WITHIN THE PAST 12 MONTHS, YOU WORRIED THAT YOUR FOOD WOULD RUN OUT BEFORE YOU GOT MONEY TO BUY MORE.: NEVER TRUE

## 2022-03-04 SDOH — ECONOMIC STABILITY: FOOD INSECURITY: WITHIN THE PAST 12 MONTHS, THE FOOD YOU BOUGHT JUST DIDN'T LAST AND YOU DIDN'T HAVE MONEY TO GET MORE.: NEVER TRUE

## 2022-03-04 ASSESSMENT — PATIENT HEALTH QUESTIONNAIRE - PHQ9
SUM OF ALL RESPONSES TO PHQ QUESTIONS 1-9: 0
SUM OF ALL RESPONSES TO PHQ9 QUESTIONS 1 & 2: 0
SUM OF ALL RESPONSES TO PHQ QUESTIONS 1-9: 0
1. LITTLE INTEREST OR PLEASURE IN DOING THINGS: 0
SUM OF ALL RESPONSES TO PHQ QUESTIONS 1-9: 0
2. FEELING DOWN, DEPRESSED OR HOPELESS: 0
SUM OF ALL RESPONSES TO PHQ QUESTIONS 1-9: 0

## 2022-03-04 ASSESSMENT — SOCIAL DETERMINANTS OF HEALTH (SDOH): HOW HARD IS IT FOR YOU TO PAY FOR THE VERY BASICS LIKE FOOD, HOUSING, MEDICAL CARE, AND HEATING?: NOT HARD AT ALL

## 2022-03-04 NOTE — PROGRESS NOTES
Chief Complaint   Patient presents with    Diabetes       Assessment/Plan:  Jorge A Holland was seen today for diabetes. Diagnoses and all orders for this visit:    Thoracic aorta atherosclerosis ([facility]    Chronic obstructive pulmonary disease, unspecified COPD type ([facility]  Comments:  She continues to smoke despite warnings not to. Type 2 diabetes mellitus with hyperlipidemia (HCC)  -     metFORMIN (GLUCOPHAGE) 1000 MG tablet; Take 1 tablet by mouth 2 times daily (with meals)  -     losartan (COZAAR) 100 MG tablet; Take 1 tablet by mouth daily  -     Hemoglobin A1C; Future  -     Continuous Blood Gluc  ([address]) KERMIT; 1 Units by Does not apply route continuous  -     Continuous Blood Gluc Sensor (DEXCOM G6 SENSOR) MISC; 2 Units by Does not apply route continuous    Hypertension associated with diabetes ([facility]  -     metFORMIN (GLUCOPHAGE) 1000 MG tablet; Take 1 tablet by mouth 2 times daily (with meals)  -     losartan (COZAAR) 100 MG tablet; Take 1 tablet by mouth daily  -     hydroCHLOROthiazide (MICROZIDE) 12.5 MG capsule; Take 1 capsule by mouth every morning  -     Hemoglobin A1C; Future  -     Continuous Blood Gluc  ([address]) KERMIT; 1 Units by Does not apply route continuous  -     Continuous Blood Gluc Sensor (DEXCOM G6 SENSOR) MISC; 2 Units by Does not apply route continuous    Atherosclerosis of native coronary artery of native heart without angina pectoris  -     hydroCHLOROthiazide (MICROZIDE) 12.5 MG capsule; Take 1 capsule by mouth every morning    Encounter for screening mammogram for malignant neoplasm of breast  -     ANASTASIIA DIGITAL SCREEN W OR WO CAD BILATERAL; Future    Post-COVID chronic headache  Comments:  Chronic, not progressive. Vitals:    03/04/22 1013   BP: 124/74   Pulse: 68   Temp: 97.1 °F (36.2 °C)   SpO2: 98%       Physical Exam  Vitals and nursing note reviewed. Constitutional:       General: She is not in acute distress.      Appearance: She is well-developed. She is not diaphoretic. HENT:      Head: Normocephalic and atraumatic. Cardiovascular:      Rate and Rhythm: Normal rate and regular rhythm. Heart sounds: Normal heart sounds. No murmur heard. No friction rub. No gallop. Pulmonary:      Effort: Pulmonary effort is normal. No respiratory distress. Breath sounds: Normal breath sounds. No wheezing or rales. Chest:      Chest wall: No tenderness. Musculoskeletal:      Comments: No calluses or open lesions   Skin:     General: Skin is warm. Findings: No erythema or rash. Neurological:      Mental Status: She is alert and oriented to person, place, and time. Cranial Nerves: No cranial nerve deficit. Comments: Normal sensation to microfilament bilaterally. Psychiatric:         Behavior: Behavior normal.         Thought Content:  Thought content normal.         Judgment: Judgment normal.         PHQ Scores 3/4/2022 9/3/2021 1/8/2021 8/28/2020 2/18/2019 8/21/2018 8/22/2017   PHQ2 Score 0 0 0 0 0 0 0   PHQ9 Score 0 0 0 0 0 0 0     Interpretation of Total Score Depression Severity: 1-4 = Minimal depression, 5-9 = Mild depression, 10-14 = Moderate depression, 15-19 = Moderately severe depression, 20-27 = Severe depression    Amy Perdomo MD  [address]

## 2022-03-05 LAB
ESTIMATED AVERAGE GLUCOSE: 177.2 MG/DL
HBA1C MFR BLD: 7.8 %

## 2022-03-07 ENCOUNTER — TELEPHONE (OUTPATIENT)
Dept: ADMINISTRATIVE | Age: 72
End: 2022-03-07

## 2022-03-07 NOTE — TELEPHONE ENCOUNTER
Submitted PA for Dexcom G6  device  [facility] Key: [id number]. Per plan \"this is a plan exclusion. Dexcom G6 Reciever is not coveredby the plan. \"    If this requires a response please respond to the pool. [address])    Please advise patient. Thank you.

## 2022-03-11 DIAGNOSIS — I15.2 HYPERTENSION ASSOCIATED WITH DIABETES (HCC): ICD-10-CM

## 2022-03-11 DIAGNOSIS — I25.10 ATHEROSCLEROSIS OF NATIVE CORONARY ARTERY OF NATIVE HEART WITHOUT ANGINA PECTORIS: ICD-10-CM

## 2022-03-11 DIAGNOSIS — E11.59 HYPERTENSION ASSOCIATED WITH DIABETES (HCC): ICD-10-CM

## 2022-03-11 DIAGNOSIS — E11.69 TYPE 2 DIABETES MELLITUS WITH HYPERLIPIDEMIA (HCC): ICD-10-CM

## 2022-03-11 DIAGNOSIS — E78.5 TYPE 2 DIABETES MELLITUS WITH HYPERLIPIDEMIA (HCC): ICD-10-CM

## 2022-03-11 RX ORDER — HYDROCHLOROTHIAZIDE 12.5 MG/1
12.5 CAPSULE, GELATIN COATED ORAL EVERY MORNING
Qty: 90 CAPSULE | Refills: 3 | Status: SHIPPED | OUTPATIENT
Start: 2022-03-11

## 2022-03-11 RX ORDER — LOSARTAN POTASSIUM 100 MG/1
100 TABLET ORAL DAILY
Qty: 90 TABLET | Refills: 3 | Status: SHIPPED | OUTPATIENT
Start: 2022-03-11

## 2022-03-11 NOTE — TELEPHONE ENCOUNTER
Patient called to have the following medication:  -metFORMIN (GLUCOPHAGE) 1000 MG tablet    -losartan (COZAAR) 100 MG tablet     -hydroCHLOROthiazide (MICROZIDE) 12.5 MG capsule     Patient is stating that she cancelled the order for [facility]  -due to cost    Patient is requesting prescriptions be sent to [facility]    Phone: [phone]

## 2022-03-30 ENCOUNTER — OFFICE VISIT (OUTPATIENT)
Dept: INTERNAL MEDICINE CLINIC | Age: 72
End: 2022-03-30

## 2022-03-30 VITALS
DIASTOLIC BLOOD PRESSURE: 60 MMHG | BODY MASS INDEX: 28.21 KG/M2 | RESPIRATION RATE: 16 BRPM | SYSTOLIC BLOOD PRESSURE: 112 MMHG | WEIGHT: 174.8 LBS | TEMPERATURE: 97.4 F | HEART RATE: 83 BPM | OXYGEN SATURATION: 97 %

## 2022-03-30 DIAGNOSIS — E78.5 TYPE 2 DIABETES MELLITUS WITH HYPERLIPIDEMIA (HCC): Primary | ICD-10-CM

## 2022-03-30 DIAGNOSIS — E11.69 TYPE 2 DIABETES MELLITUS WITH HYPERLIPIDEMIA (HCC): Primary | ICD-10-CM

## 2022-03-30 PROCEDURE — APPNB60 APP NON BILLABLE TIME 46-60 MINS: Performed by: INTERNAL MEDICINE

## 2022-03-30 RX ORDER — FLASH GLUCOSE SENSOR
KIT MISCELLANEOUS
Qty: 6 EACH | Refills: 3 | Status: SHIPPED | OUTPATIENT
Start: 2022-03-30

## 2022-03-30 RX ORDER — DULAGLUTIDE 0.75 MG/.5ML
0.75 INJECTION, SOLUTION SUBCUTANEOUS WEEKLY
Qty: 4 PEN | Refills: 1
Start: 2022-03-30 | End: 2022-05-11 | Stop reason: SDUPTHER

## 2022-03-30 NOTE — PROGRESS NOTES
CLINICAL PHARMACY NOTE--Diabetes    Saira Agosto is a 71 y.o. female with PMHX significant for CAD, HTN and Type 2 Diabetes referred by Dr Gordon Zhou for diabetes counseling and medication management. CC: Patient is interested in obtaining a Dexcom. States her test strips were $178 and she would prefer to pay for a CGM instead if her test strips are no longer free. Other medications are affordable or free since switching to mailorder. Past Medical History:   Diagnosis Date    Cataract 6/23/2015    COVID-19     History of acute sinusitis 12/04/09    last OV    Hypertension     Microcytic anemia     Rotator cuff syndrome of right shoulder 2/4/2014    Type II or unspecified type diabetes mellitus without mention of complication, not stated as uncontrolled      Treatment Adherence:  Diet: did not address during visit, but admits she could make some changes   Exercise: did not address  Weight trend: stable  Barriers: cost of medication, strips currently $178    Type 2 Diabetes Mellitus  Current symptoms/problems include none. Episodes of hypoglycemia? yes - has BG <70 about 3 times per week in the morning. Eye exam current (within one year): unknown  Tobacco history: She  reports that she has been smoking cigarettes. She has a 15.00 pack-year smoking history. She has never used smokeless tobacco.     Current diabetes medications: levemir 25 units QHS, metformin 1g BID  Compliance:  compliant all of the time   Side effects:  none  Cost: free/affordable   Previous treatment regimens / response: states lantus dropped her BG down to 30. Prescribed ozempic, but $500    Home blood sugar records:   Checks FBG and gets BG <70 sometimes 3 times per week. Dexcom-submitted RX to [facility]  $178 for test strips- unable to pay   levemir $0  BG <70:  3x per week  levemir before bed. DME supplier    Hypertension:    On Ace/ARB- losartan  Medication side effects: no medication side effects noted.     Use of agents associated with hypertension: none. Home blood pressure monitoring:     Hyperlipidemia:  Not on statin  On aspirin 81 mg    Social History     Tobacco Use    Smoking status: Current Every Day Smoker     Packs/day: 0.75     Years: 20.00     Pack years: 15.00     Types: Cigarettes    Smokeless tobacco: Never Used   Substance Use Topics    Alcohol use: No        Physical Exam  /60   Pulse 83   Temp 97.4 °F (36.3 °C) (Temporal)   Resp 16   Wt 174 lb 12.8 oz (79.3 kg)   SpO2 97%   Breastfeeding No   BMI 28.21 kg/m²    Body mass index is 28.21 kg/m².   Wt Readings from Last 3 Encounters:   03/30/22 174 lb 12.8 oz (79.3 kg)   03/04/22 175 lb (79.4 kg)   09/03/21 176 lb 6.4 oz (80 kg)      BP Readings from Last 3 Encounters:   03/30/22 112/60   03/04/22 124/74   09/03/21 110/68        Pertinent Labs:  Lab Results   Component Value Date    LABA1C 7.8 03/04/2022    LABA1C 7.5 09/03/2021    LABA1C 8.0 01/08/2021      Lab Results   Component Value Date    MALBCR see below 09/03/2021    LABMICR <1.20 09/03/2021     No results found for: [facility]   Lab Results   Component Value Date    LDLCALC 139 (H) 09/03/2021    CHOL 234 (H) 09/03/2021    TRIG 87 09/03/2021    HDL 78 (H) 09/03/2021     Lab Results   Component Value Date    CREATININE 0.7 09/03/2021    BUN 20 09/03/2021     09/03/2021    K 5.3 (H) 09/03/2021     09/03/2021    CO2 23 09/03/2021     Lab Results   Component Value Date    AST 18 09/03/2021    ALT 16 09/03/2021    BILITOT 0.4 09/03/2021    ALKPHOS 71 09/03/2021     No components found for: VITB12  Lab Results   Component Value Date    TSH 0.76 09/03/2021        No Known Allergies  Immunization History   Administered Date(s) Administered    COVID-19, Moderna, Primary or Immunocompromised, PF, 100mcg/0.5mL 03/19/2021, 04/16/2021, 02/17/2022    Influenza Virus Vaccine 03/10/2014, 11/24/2014, 11/17/2015    Influenza, Quadv, IM, PF (6 mo and older Fluzone, Flulaval, Fluarix, and 3 yrs and older Afluria) 12/12/2016    Influenza, Triv, inactivated, subunit, adjuvanted, IM (Fluad 65 yrs and older) 11/18/2019    Pneumococcal Conjugate 13-valent ([id number]) 06/14/2016    Pneumococcal Polysaccharide ([id number]) 03/10/2014, 05/20/2019    Tdap (Boostrix, Adacel) 04/09/2013     Current Outpatient Medications on File Prior to Visit   Medication Sig Dispense Refill    hydroCHLOROthiazide (MICROZIDE) 12.5 MG capsule Take 1 capsule by mouth every morning 90 capsule 3    metFORMIN (GLUCOPHAGE) 1000 MG tablet Take 1 tablet by mouth 2 times daily (with meals) 180 tablet 3    losartan (COZAAR) 100 MG tablet Take 1 tablet by mouth daily 90 tablet 3    insulin detemir (LEVEMIR FLEXTOUCH) 100 UNIT/ML injection pen INJECT 25 UNITS UNDER THE SKIN DAILY 15 mL 1    zolpidem (AMBIEN) 10 MG tablet Take 1 tablet by mouth nightly as needed for Sleep for up to 90 days. 90 tablet 0    ibuprofen (ADVIL;MOTRIN) 600 MG tablet TAKE 1 TABLET BY MOUTH THREE TIMES DAILY AS NEEDED FOR PAIN 90 tablet 2    aspirin 81 MG tablet Take 81 mg by mouth daily.       Continuous Blood Gluc  (DEXCOM G6 ) KERMIT 1 Units by Does not apply route continuous (Patient not taking: Reported on 3/30/2022) 1 each 1    Continuous Blood Gluc Sensor (DEXCOM G6 SENSOR) MISC 2 Units by Does not apply route continuous 2 each 3    Insulin Pen Needle (RELION PEN NEEDLES) 32G X 4 MM MISC Inject 1 each into the skin 2 times daily 100 each 2    blood glucose test strips (FREESTYLE LITE) strip USE 1 STRIP TO CHECK GLUCOSE THREE TIMES DAILY (E11.9 - ICD 10) 100 each 1    Blood Glucose Monitoring Suppl (FREESTYLE LITE) KERMIT USE AS DIRECTED 1 Device 0    albuterol sulfate HFA (PROVENTIL HFA) 108 (90 Base) MCG/ACT inhaler Inhale 2 puffs into the lungs every 6 hours as needed for Wheezing (Patient not taking: Reported on 1/5/2022) 1 Inhaler 3    Insulin Syringes, Disposable, U-100 1 ML MISC 1 each by Does not apply route daily 100 each 3    glucose monitoring kit (FREESTYLE) monitoring kit 1 kit by Does not apply route daily as needed 1 kit 0    FREESTYLE LANCETS MISC 1 each by Does not apply route daily 100 each 3    Blood Glucose Monitoring Suppl (ONE TOUCH BASIC SYSTEM) W/DEVICE KIT 1 Device by Does not apply route. 1 kit 0     No current facility-administered medications on file prior to visit. Medication list reviewed and updated. The 10-year ASCVD risk score (Isidro Machado, et al., 2013) is: 45.9%    Values used to calculate the score:      Age: 71 years      Sex: Female      Is Non- : Yes      Diabetic: Yes      Tobacco smoker: Yes      Systolic Blood Pressure: 168 mmHg      Is BP treated: Yes      HDL Cholesterol: 78 mg/dL      Total Cholesterol: 234 mg/dL    Assessment/Plan:   Type 2 Diabetes  A1c above goal, despite AM hypoglycemia  FBG at goal/low, assuming post prandial BG is elevated. Comorbities include:  ASCVD  Considerations include: minimize hypoglycemia, cost of medication  -Medications:    Change levemir to AM dosing. Will decrease dose if pt continues to get BG <70. Consider changing levemir to basaglar if pt agreeable. [employer] application submitted. If approved, will start Trulicity 5.82 mg qWK +decrease basal insulin   Pt will bring Trulicity to OV in 2 wks for instruction. Continue metformin   -Provided jak sample and teaching on how to apply. Pt to scan BG at least every 8 hours. Will submit RX to DME and pharmacy to check coverage.   -Labs ordered: none    Health Maintenance Due   Topic Date Due    Shingles Vaccine (1 of 2) Never done    Diabetic retinal exam  04/23/2020     Hypertension  BP at goal <130/80 without albuminuria  Medications: ACE  Labs: none    Hyperlipidemia  Consider high intensity statin. - will discuss at next visit  Labs: [address] [place] annually    Education provided:  Discussed general issues about diabetes pathophysiology and management.   Reviewed A1c and blood sugar goals with patient  Counseled on changes to medication regimen and common side effects  Reviewed [employer] application and use. Provided insulin and GLP1RA teaching  Discussed symptoms and management of hypoglycemic episodes. Encouraged aerobic exercise   Educated on diet    Follow up: 2 wk    Discussed with patient the Pharmacist Collaborative Practice Agreement. Patient provided verbal and/or electronic (ex. [email or web address]) consent to participate in the collaborative practice agreement between the pharmacist and referred patient. This is in lieu of paper consent due to COVID-19 precautions and the use of remote/virtual visits. Time spent with patient:  60 min    Genna White, PharmD, [facility] Medication Management  PH: [address] in place:   Yes   Recommendation Provided To: Patient/Caregiver: 6 via In person   Intervention Detail: Adherence Monitorin, Device Training, New Rx: 2, reason: Needs Additional Therapy, Patient Preference, Patient Access Assistance/Sample Provided and Scheduled Appointment   Gap Closed?: No    Intervention Accepted By: Patient/Caregiver: 6   Time Spent (min): 60

## 2022-03-31 NOTE — PROGRESS NOTES
Learta Lundborg submitted to [employer] through [email or web address] [facility] portal.     Chico Smith, PharmD, [facility]  Medication Management Clinic   [address] Ph: [phone]  [place] Ph: [phone]  3/31/2022 5:33 PM

## 2022-04-06 ENCOUNTER — TELEPHONE (OUTPATIENT)
Dept: PHARMACY | Age: 72
End: 2022-04-06

## 2022-04-06 NOTE — TELEPHONE ENCOUNTER
Spoke with pt for diabetes f/u. She is still having AM hypoglycemia. She is taking levemir 30 units qAM. Pt instructed to reduce levemir to 25 units qAM.      Invitation sent to patient's email to share [employer] [facility] [address] [place] [address] remotely. Pt has not heard from [address] [employer]. Will call [employer] next week if still haven't heard from them re: Trulicity. Will f/u with pt next week. Barbara Rodriguez, PharmD, [facility]  Medication Management Clinic   [address] Ph: [phone]  Josselyn Sharpe Ph: [phone]  4/6/2022 2:40 PM      For Pharmacy Admin Tracking Only     CPA in place:   Yes   Recommendation Provided To: Patient/Caregiver: 1 via Telephone   Intervention Detail: Dose Adjustment: 1, reason: Therapy De-escalation   Gap Closed?: No    Intervention Accepted By: Patient/Caregiver: 1   Time Spent (min): 15

## 2022-04-07 DIAGNOSIS — F51.01 PRIMARY INSOMNIA: ICD-10-CM

## 2022-04-08 RX ORDER — ZOLPIDEM TARTRATE 10 MG/1
TABLET ORAL
Qty: 90 TABLET | Refills: 1 | Status: SHIPPED | OUTPATIENT
Start: 2022-04-08 | End: 2022-07-07

## 2022-04-13 ENCOUNTER — OFFICE VISIT (OUTPATIENT)
Dept: INTERNAL MEDICINE CLINIC | Age: 72
End: 2022-04-13

## 2022-04-13 VITALS
WEIGHT: 176.8 LBS | DIASTOLIC BLOOD PRESSURE: 64 MMHG | TEMPERATURE: 97.7 F | OXYGEN SATURATION: 99 % | HEART RATE: 82 BPM | RESPIRATION RATE: 16 BRPM | BODY MASS INDEX: 28.54 KG/M2 | SYSTOLIC BLOOD PRESSURE: 116 MMHG

## 2022-04-13 DIAGNOSIS — E78.5 TYPE 2 DIABETES MELLITUS WITH HYPERLIPIDEMIA (HCC): ICD-10-CM

## 2022-04-13 DIAGNOSIS — E11.69 TYPE 2 DIABETES MELLITUS WITH HYPERLIPIDEMIA (HCC): ICD-10-CM

## 2022-04-13 PROCEDURE — APPNB60 APP NON BILLABLE TIME 46-60 MINS: Performed by: INTERNAL MEDICINE

## 2022-04-13 NOTE — PATIENT INSTRUCTIONS
Decrease levemir to 22 units every day at [facility] carbs with protein (plain greek yogurt, cheese, peanut butter, nuts, eggs, chix, fish, etc)  Expect a call from [facility] re: [address] a call from [facility] (pharmacy that delivers [address] [facility])    [address] [facility]

## 2022-04-13 NOTE — PROGRESS NOTES
CLINICAL PHARMACY NOTE--Diabetes    Ke Currie is a 71 y.o. female with PMHX significant for CAD, HTN and Type 2 Diabetes referred by Dr Mario Jha for diabetes counseling and medication management. Interval update:  Patient is interested in obtaining a CGM-->[facility] RX submitted to [facility] through [facility] portal.  Able to remotely see BG through [employer]. States her test strips were $178 and she would prefer to pay for a CGM instead if her test strips are no longer free. Other medications are affordable or free since switching to mailorder. Since last visit, she was having AM hypoglycemia, this has improved, but she continues to have occasional hypoglycemia. She has decreased levemir from 30 units to 25 units qAM.  Pt has not heard from [employer] re: trulicity. Past Medical History:   Diagnosis Date    Cataract 6/23/2015    COVID-19     History of acute sinusitis 12/04/09    last OV    Hypertension     Microcytic anemia     Rotator cuff syndrome of right shoulder 2/4/2014    Type II or unspecified type diabetes mellitus without mention of complication, not stated as uncontrolled      Treatment Adherence:  Diet: 1-2 meals per day, snacks on oranges, banana, potato chips, grapes   Lunch 12p   Dinner 7p  Exercise: no regular exercise, but walks in summer. Weight trend: stable  Barriers: cost of medication, strips currently $178    Type 2 Diabetes Mellitus  Current symptoms/problems include none. Episodes of hypoglycemia? yes - has BG <70 about 2-3 times per week in the morning. Eye exam current (within one year): unknown  Tobacco history: She  reports that she has been smoking cigarettes. She has a 15.00 pack-year smoking history.  She has never used smokeless tobacco.     Current diabetes medications: levemir 25 units QAM, metformin 1g BID  Compliance:  compliant all of the time   Side effects:  none  Cost: free/affordable   Previous treatment regimens / response: states lantus dropped her BG down to 30. Prescribed ozempic, but $500    Home blood sugar records:   [place] 3/31-4/13/22  Avg 131  V high 1%  High 15%  Target 78%  Low 5%  v low 1%    Hypertension:     On Ace/ARB- losartan  Medication side effects: no medication side effects noted. Use of agents associated with hypertension: none. Home blood pressure monitoring:     Hyperlipidemia:  Not on statin  On aspirin 81 mg    Social History     Tobacco Use    Smoking status: Current Every Day Smoker     Packs/day: 0.75     Years: 20.00     Pack years: 15.00     Types: Cigarettes    Smokeless tobacco: Never Used   Substance Use Topics    Alcohol use: No        Physical Exam  Pulse 82   Temp 97.7 °F (36.5 °C) (Temporal)   Resp 16   Wt 176 lb 12.8 oz (80.2 kg)   SpO2 99%   Breastfeeding No   BMI 28.54 kg/m²    Body mass index is 28.54 kg/m².   Wt Readings from Last 3 Encounters:   04/13/22 176 lb 12.8 oz (80.2 kg)   03/30/22 174 lb 12.8 oz (79.3 kg)   03/04/22 175 lb (79.4 kg)      BP Readings from Last 3 Encounters:   03/30/22 112/60   03/04/22 124/74   09/03/21 110/68        Pertinent Labs:  Lab Results   Component Value Date    LABA1C 7.8 03/04/2022    LABA1C 7.5 09/03/2021    LABA1C 8.0 01/08/2021      Lab Results   Component Value Date    MALBCR see below 09/03/2021    LABMICR <1.20 09/03/2021     No results found for: [facility]   Lab Results   Component Value Date    LDLCALC 139 (H) 09/03/2021    CHOL 234 (H) 09/03/2021    TRIG 87 09/03/2021    HDL 78 (H) 09/03/2021     Lab Results   Component Value Date    CREATININE 0.7 09/03/2021    BUN 20 09/03/2021     09/03/2021    K 5.3 (H) 09/03/2021     09/03/2021    CO2 23 09/03/2021     Lab Results   Component Value Date    AST 18 09/03/2021    ALT 16 09/03/2021    BILITOT 0.4 09/03/2021    ALKPHOS 71 09/03/2021     No components found for: VITB12  Lab Results   Component Value Date    TSH 0.76 09/03/2021        No Known Allergies  Immunization History   Administered Date(s) Administered    COVID-19, Moderna, Primary or Immunocompromised, PF, 100mcg/0.5mL 03/19/2021, 04/16/2021, 02/17/2022    Influenza Virus Vaccine 03/10/2014, 11/24/2014, 11/17/2015    Influenza, Quadv, IM, PF (6 mo and older Fluzone, Flulaval, Fluarix, and 3 yrs and older Afluria) 12/12/2016    Influenza, Triv, inactivated, subunit, adjuvanted, IM (Fluad 65 yrs and older) 11/18/2019    Pneumococcal Conjugate 13-valent ([id number]) 06/14/2016    Pneumococcal Polysaccharide ([id number]) 03/10/2014, 05/20/2019    Tdap (Boostrix, Adacel) 04/09/2013     Current Outpatient Medications on File Prior to Visit   Medication Sig Dispense Refill    zolpidem (AMBIEN) 10 MG tablet TAKE ONE TABLET BY MOUTH ONCE NIGHTLY AS NEEDED FOR SLEEP 90 tablet 1    hydroCHLOROthiazide (MICROZIDE) 12.5 MG capsule Take 1 capsule by mouth every morning 90 capsule 3    metFORMIN (GLUCOPHAGE) 1000 MG tablet Take 1 tablet by mouth 2 times daily (with meals) 180 tablet 3    losartan (COZAAR) 100 MG tablet Take 1 tablet by mouth daily 90 tablet 3    insulin detemir (LEVEMIR FLEXTOUCH) 100 UNIT/ML injection pen INJECT 25 UNITS UNDER THE SKIN DAILY 15 mL 1    ibuprofen (ADVIL;MOTRIN) 600 MG tablet TAKE 1 TABLET BY MOUTH THREE TIMES DAILY AS NEEDED FOR PAIN 90 tablet 2    aspirin 81 MG tablet Take 81 mg by mouth daily.       Continuous Blood Gluc Sensor (FREESTYLE ANDRES 2 SENSOR) MISC Use to check blood sugar before meals and at bedtime 6 each 3    Dulaglutide (TRULICITY) 9.61 TC/7.3SL SOPN Inject 0.75 mg into the skin once a week (Patient not taking: Reported on 4/13/2022) 4 pen 1    Insulin Pen Needle (RELION PEN NEEDLES) 32G X 4 MM MISC Inject 1 each into the skin 2 times daily 100 each 2    blood glucose test strips (FREESTYLE LITE) strip USE 1 STRIP TO CHECK GLUCOSE THREE TIMES DAILY (E11.9 - ICD 10) 100 each 1    Blood Glucose Monitoring Suppl (FREESTYLE LITE) KERMIT USE AS DIRECTED 1 Device 0    albuterol sulfate HFA 04/23/2020     Hypertension  BP at goal <130/80 without albuminuria  Medications: ACE  Labs: none    Hyperlipidemia  Consider high intensity statin. - will discuss at next visit  Labs: [address] annually    Education provided:  Discussed general issues about diabetes pathophysiology and management. Reviewed A1c and blood sugar goals with patient  Counseled on changes to medication regimen and common side effects  Reviewed [employer] application and use. Provided insulin and GLP1RA teaching  Discussed symptoms and management of hypoglycemic episodes. Encouraged aerobic exercise   Educated on diet    Follow up: 2 wk    Discussed with patient the Pharmacist Collaborative Practice Agreement. Patient provided verbal and/or electronic (ex. Mark mediahart) consent to participate in the collaborative practice agreement between the pharmacist and referred patient. This is in lieu of paper consent due to COVID-19 precautions and the use of remote/virtual visits. Time spent with patient:  60 min    Sharee Mackay, PharmD, [facility] Medication Management  PH: [address] in place:   Yes   Recommendation Provided To: Patient/Caregiver: 4 via In person   Intervention Detail: Device Training, Dose Adjustment: 1, reason: Therapy De-escalation, Patient Access Assistance/Sample Provided and Scheduled Appointment   Gap Closed?: No    Intervention Accepted By: Patient/Caregiver: 4   Time Spent (min): 60

## 2022-04-20 ENCOUNTER — TELEPHONE (OUTPATIENT)
Dept: PHARMACY | Age: 72
End: 2022-04-20

## 2022-04-20 NOTE — TELEPHONE ENCOUNTER
Daron Sureshar approved for pt and due to be shipped on 4/22/22. Submitted [employer] paperwork for basaglar which will replace levemir if approved. Started trulicity 9.38 mg on 6/25/92. Now taking levemir 18 units and having some Bgs in the 60s. Pt instructed to reduce levemir to 16 units. For Joby Mohamud in place:   Yes   Recommendation Provided To: Patient/Caregiver: 2 via Telephone   Intervention Detail: Dose Adjustment: 1, reason: Therapy De-escalation and Patient Access Assistance/Sample Provided   Gap Closed?: No    Intervention Accepted By: Patient/Caregiver: 2   Time Spent (min): 15    Sharee Mackay, PharmD, [facility]  Medication Management Clinic   [place] [address] Ph: [phone]  [facility] Ph: [phone]  4/20/2022 5:01 PM

## 2022-04-27 ENCOUNTER — OFFICE VISIT (OUTPATIENT)
Dept: INTERNAL MEDICINE CLINIC | Age: 72
End: 2022-04-27

## 2022-04-27 VITALS
DIASTOLIC BLOOD PRESSURE: 80 MMHG | HEART RATE: 103 BPM | SYSTOLIC BLOOD PRESSURE: 134 MMHG | BODY MASS INDEX: 28.12 KG/M2 | HEIGHT: 66 IN | TEMPERATURE: 97.4 F | WEIGHT: 175 LBS | OXYGEN SATURATION: 99 %

## 2022-04-27 DIAGNOSIS — E11.69 TYPE 2 DIABETES MELLITUS WITH HYPERLIPIDEMIA (HCC): ICD-10-CM

## 2022-04-27 DIAGNOSIS — E78.5 TYPE 2 DIABETES MELLITUS WITH HYPERLIPIDEMIA (HCC): ICD-10-CM

## 2022-04-27 NOTE — PROGRESS NOTES
Laverne Tucker is a 71 y.o. female with PMHX significant for CAD, HTN and Type 2 Diabetes referred by Dr Kadeem Mark for diabetes counseling and medication management. Interval update:  [facility] shipped on 4/22/22- assisted pt with application today. Submitted [employer] paperwork for basaglar which will replace levemir if approved. Started trulicity 4.95 mg on 7/47/19. Reduced levemir to 16 units and no lows since decreased. Past Medical History:   Diagnosis Date    Cataract 6/23/2015    COVID-19     History of acute sinusitis 12/04/09    last OV    Hypertension     Microcytic anemia     Rotator cuff syndrome of right shoulder 2/4/2014    Type II or unspecified type diabetes mellitus without mention of complication, not stated as uncontrolled      Treatment Adherence:  Diet: 1-2 meals per day, snacks on oranges, banana, potato chips, grapes   Lunch 12p   Dinner 7p  Exercise: no regular exercise, but walks in summer. Weight trend: stable  Barriers: cost of medication, strips currently $178    Type 2 Diabetes Mellitus  Current symptoms/problems include none. Episodes of hypoglycemia? yes - has BG <70 about 2-3 times per week in the morning. Eye exam current (within one year): unknown  Tobacco history: She  reports that she has been smoking cigarettes. She has a 15.00 pack-year smoking history. She has never used smokeless tobacco.     Current diabetes medications: levemir 16 units QAM, metformin 1g BID, Trulicity 3.55 mg weekly   Compliance:  compliant all of the time - sometimes skips PM dose of metformin if PM BG is low  Side effects:  Feels \"funny\" on 2nd day after trulicity injections  Cost: free/affordable   Previous treatment regimens / response: states carrera dropped her BG down to 30.  Prescribed ozempic, but $500    Home blood sugar records:   LibreView 3/31-4/13/22  Avg 131  V high 1%  High 15%  Target 78%  Low 5%  v low 1%    4/14-4/27/22  avg 130  V high 2%  High 7%  Target 91%    Hypertension:     On Ace/ARB- losartan  Medication side effects: no medication side effects noted. Use of agents associated with hypertension: none. Home blood pressure monitoring:     Hyperlipidemia:  Not on statin  On aspirin 81 mg    Social History     Tobacco Use    Smoking status: Current Every Day Smoker     Packs/day: 0.75     Years: 20.00     Pack years: 15.00     Types: Cigarettes    Smokeless tobacco: Never Used   Substance Use Topics    Alcohol use: No        Physical Exam  Ht 5' 6\" (1.676 m)   Wt 175 lb (79.4 kg)   BMI 28.25 kg/m²    Body mass index is 28.25 kg/m².   Wt Readings from Last 3 Encounters:   04/27/22 175 lb (79.4 kg)   04/13/22 176 lb 12.8 oz (80.2 kg)   03/30/22 174 lb 12.8 oz (79.3 kg)      BP Readings from Last 3 Encounters:   04/13/22 116/64   03/30/22 112/60   03/04/22 124/74        Pertinent Labs:  Lab Results   Component Value Date    LABA1C 7.8 03/04/2022    LABA1C 7.5 09/03/2021    LABA1C 8.0 01/08/2021      Lab Results   Component Value Date    MALBCR see below 09/03/2021    LABMICR <1.20 09/03/2021     No results found for: [facility]   Lab Results   Component Value Date    LDLCALC 139 (H) 09/03/2021    CHOL 234 (H) 09/03/2021    TRIG 87 09/03/2021    HDL 78 (H) 09/03/2021     Lab Results   Component Value Date    CREATININE 0.7 09/03/2021    BUN 20 09/03/2021     09/03/2021    K 5.3 (H) 09/03/2021     09/03/2021    CO2 23 09/03/2021     Lab Results   Component Value Date    AST 18 09/03/2021    ALT 16 09/03/2021    BILITOT 0.4 09/03/2021    ALKPHOS 71 09/03/2021     No components found for: VITB12  Lab Results   Component Value Date    TSH 0.76 09/03/2021        No Known Allergies  Immunization History   Administered Date(s) Administered    COVID-19, Moderna, Primary or Immunocompromised, PF, 100mcg/0.5mL 03/19/2021, 04/16/2021, 02/17/2022    Influenza Virus Vaccine 03/10/2014, 11/24/2014, 11/17/2015    Influenza, Quadv, IM, PF (6 mo and older Fluzone, Flulaval, Fluarix, and 3 yrs and older Afluria) 12/12/2016    Influenza, Triv, inactivated, subunit, adjuvanted, IM (Fluad 65 yrs and older) 11/18/2019    Pneumococcal Conjugate 13-valent ([id number]) 06/14/2016    Pneumococcal Polysaccharide (Lvpnczonz01) 03/10/2014, 05/20/2019    Tdap (Boostrix, Adacel) 04/09/2013     Current Outpatient Medications on File Prior to Visit   Medication Sig Dispense Refill    zolpidem (AMBIEN) 10 MG tablet TAKE ONE TABLET BY MOUTH ONCE NIGHTLY AS NEEDED FOR SLEEP 90 tablet 1    Continuous Blood Gluc Sensor (FREESTYLE ANDRES 2 SENSOR) MISC Use to check blood sugar before meals and at bedtime 6 each 3    hydroCHLOROthiazide (MICROZIDE) 12.5 MG capsule Take 1 capsule by mouth every morning 90 capsule 3    metFORMIN (GLUCOPHAGE) 1000 MG tablet Take 1 tablet by mouth 2 times daily (with meals) 180 tablet 3    losartan (COZAAR) 100 MG tablet Take 1 tablet by mouth daily 90 tablet 3    insulin detemir (LEVEMIR FLEXTOUCH) 100 UNIT/ML injection pen INJECT 25 UNITS UNDER THE SKIN DAILY 15 mL 1    Insulin Pen Needle (RELION PEN NEEDLES) 32G X 4 MM MISC Inject 1 each into the skin 2 times daily 100 each 2    blood glucose test strips (FREESTYLE LITE) strip USE 1 STRIP TO CHECK GLUCOSE THREE TIMES DAILY (E11.9 - ICD 10) 100 each 1    ibuprofen (ADVIL;MOTRIN) 600 MG tablet TAKE 1 TABLET BY MOUTH THREE TIMES DAILY AS NEEDED FOR PAIN 90 tablet 2    Blood Glucose Monitoring Suppl (FREESTYLE LITE) KERMIT USE AS DIRECTED 1 Device 0    Insulin Syringes, Disposable, U-100 1 ML MISC 1 each by Does not apply route daily 100 each 3    glucose monitoring kit (FREESTYLE) monitoring kit 1 kit by Does not apply route daily as needed 1 kit 0    FREESTYLE LANCETS MISC 1 each by Does not apply route daily 100 each 3    aspirin 81 MG tablet Take 81 mg by mouth daily.       Dulaglutide (TRULICITY) 2.32 OP/2.5JU SOPN Inject 0.75 mg into the skin once a week changes to medication regimen and common side effects  Reviewed [employer] application and use. Provided insulin and GLP1RA teaching  Discussed symptoms and management of hypoglycemic episodes. Encouraged aerobic exercise   Educated on diet    Follow up: 2 wk    Discussed with patient the Pharmacist Collaborative Practice Agreement. Patient provided verbal and/or electronic (ex. [email or web address]) consent to participate in the collaborative practice agreement between the pharmacist and referred patient. This is in lieu of paper consent due to COVID-19 precautions and the use of remote/virtual visits. Time spent with patient:  60 min    Silvano Garcia, PharmD, [facility] Medication Management  PH: Nánási  66. in place:   Yes   Recommendation Provided To: Patient/Caregiver: 5 via In person   Intervention Detail: Device Training, Dose Adjustment: 1, reason: Therapy De-escalation, New Rx: 1, reason: Cost/Formulary Change, Patient Access Assistance/Sample Provided and Scheduled Appointment   Gap Closed?: No    Intervention Accepted By: Patient/Caregiver: 5   Time Spent (min): 60

## 2022-04-28 ENCOUNTER — TELEPHONE (OUTPATIENT)
Dept: PHARMACY | Age: 72
End: 2022-04-28

## 2022-04-28 RX ORDER — INSULIN GLARGINE 100 [IU]/ML
14 INJECTION, SOLUTION SUBCUTANEOUS NIGHTLY
Qty: 5 PEN | Refills: 0
Start: 2022-04-28 | End: 2022-05-11 | Stop reason: ALTCHOICE

## 2022-04-28 NOTE — TELEPHONE ENCOUNTER
JANEENM with patient that she has been approved for basaglar through [employer]. Asked her to contact [facility] pharmacy to schedule a delivery. Instructed her to contact me when she receives the medication to discuss how much to take. Informed her this will replace levemir. Ever Holley, PharmD, [facility]  Medication Management Clinic   [address] Ph: [phone]  Augustus Messina Ph: [phone]  4/28/2022 3:57 PM    For Pharmacy Admin Tracking Only     CPA in place:   Yes   Recommendation Provided To: Patient/Caregiver: 3 via Telephone   Intervention Detail: Discontinued Rx: 1, reason: Cost/Formulary Change, New Rx: 1, reason: Cost/Formulary Change and Patient Access Assistance/Sample Provided   Gap Closed?: No    Intervention Accepted By: Patient/Caregiver: 3   Time Spent (min): 10

## 2022-04-28 NOTE — TELEPHONE ENCOUNTER
Spoke with pt. FBG 60 this AM despite decrease in levemir dose. Pt instructed to decrease levemir to 10 units. Because her insulin dose continues to decrease, and she may not need insulin after increasing her Trulicity, will hold off on switching to basaglar at this time. Called pharmacy to place RX on hold for now. Will tentatively plan to increase trulicity at next visit to 1.5 mg and stop insulin.      Umberto Graham, PharmD, [facility]  Medication Management Clinic   [address] Ph: [phone]  Gianna Moncada Ph: [phone]  4/28/2022 4:21 PM

## 2022-05-03 NOTE — TELEPHONE ENCOUNTER
Spoke with pt. Reviewed libreView report. 2 low BG readings in the 60s since reduction of levemir dose last week. Pt instructed to reduce dose from 10 units to 8 units daily. Pt to call clinic if still having low BGs. Key Dye, PharmD, [facility]  Medication Management Clinic   [address] Ph: [phone]  Jovan Carolina Ph: [phone]  5/3/2022 3:24 PM    For Pharmacy Admin Tracking Only     CPA in place:   Yes   Recommendation Provided To: Patient/Caregiver: 1 via Telephone   Intervention Detail: Dose Adjustment: 1, reason: Therapy De-escalation   Gap Closed?: No    Intervention Accepted By: Patient/Caregiver: 1   Time Spent (min): 10

## 2022-05-11 ENCOUNTER — SCHEDULED TELEPHONE ENCOUNTER (OUTPATIENT)
Dept: INTERNAL MEDICINE CLINIC | Age: 72
End: 2022-05-11

## 2022-05-11 DIAGNOSIS — E78.5 TYPE 2 DIABETES MELLITUS WITH HYPERLIPIDEMIA (HCC): ICD-10-CM

## 2022-05-11 DIAGNOSIS — E11.69 TYPE 2 DIABETES MELLITUS WITH HYPERLIPIDEMIA (HCC): ICD-10-CM

## 2022-05-11 RX ORDER — INSULIN DETEMIR 100 [IU]/ML
8 INJECTION, SOLUTION SUBCUTANEOUS NIGHTLY
COMMUNITY
Start: 2022-04-28 | End: 2022-06-08 | Stop reason: ALTCHOICE

## 2022-05-11 RX ORDER — ATORVASTATIN CALCIUM 40 MG/1
40 TABLET, FILM COATED ORAL DAILY
Qty: 90 TABLET | Refills: 3 | Status: SHIPPED | OUTPATIENT
Start: 2022-05-11

## 2022-05-11 RX ORDER — DULAGLUTIDE 0.75 MG/.5ML
0.75 INJECTION, SOLUTION SUBCUTANEOUS WEEKLY
Qty: 4 PEN | Refills: 1 | Status: SHIPPED | OUTPATIENT
Start: 2022-05-11 | End: 2022-08-01 | Stop reason: SDUPTHER

## 2022-05-11 RX ORDER — DULAGLUTIDE 1.5 MG/.5ML
1.5 INJECTION, SOLUTION SUBCUTANEOUS WEEKLY
Qty: 4 PEN | Refills: 1 | Status: CANCELLED | OUTPATIENT
Start: 2022-05-11

## 2022-05-11 NOTE — PROGRESS NOTES
CLINICAL PHARMACY NOTE--Diabetes    Michael Koehler is a 71 y.o. female with PMHX significant for CAD, HTN and Type 2 Diabetes referred by Dr Kush Gimenez for diabetes counseling and medication management. Interval update:  Started trulicity 1.02 mg on 4/90/47. Reduced levemir to 8 units and no low BGs since dose decreased. Past Medical History:   Diagnosis Date    Cataract 6/23/2015    COVID-19     History of acute sinusitis 12/04/09    last OV    Hypertension     Microcytic anemia     Rotator cuff syndrome of right shoulder 2/4/2014    Type II or unspecified type diabetes mellitus without mention of complication, not stated as uncontrolled      Treatment Adherence:  Diet: 1-2 meals per day, snacks on oranges, banana, potato chips, grapes, eating more strawberries because they dont spike her BG as high as grapes   Lunch 12p   Dinner 7p  Exercise: no regular exercise, but walks in summer, has not started yet. Weight trend: stable  Barriers: cost of medication (now has pt assistance), strips $178 so using jak instead    Type 2 Diabetes Mellitus  Current symptoms/problems include none. Episodes of hypoglycemia? yes - has BG <70 about 2-3 times per week in the morning. Eye exam current (within one year): unknown  Tobacco history: She  reports that she has been smoking cigarettes. She has a 15.00 pack-year smoking history. She has never used smokeless tobacco. not interested in quitting now. Hasn't thought about it. Triggers include meals and coffee, she smokes 3/4 PPD, not smoking the whole cigarette bc they don't taste as good.      Current diabetes medications: levemir 8 units QAM (4 pens left- 120 ds), metformin 1g BID, Trulicity 4.26 mg weekly (enough pens to last until 6/4/22)  Compliance:  compliant all of the time - sometimes skips PM dose of metformin if PM BG is low  Side effects:  Feels \"funny\" on 2nd day after trulicity injections, now resolved but worried about increasing trulicity dose.  Cost: free/affordable   Previous treatment regimens / response: states carrera dropped her BG down to 30. Prescribed ozempic, but $500    Home blood sugar records:   Vianney 3/31-4/13/22  Avg 131  V high 1%  High 15%  Target 78%  Low 5%  v low 1%    4/14-4/27/22  avg 130  V high 2%  High 7%  Target 91%    4/28-5/11/22  avg 131  High 8%  Target 92%    Hypertension:     On Ace/ARB- losartan  Medication side effects: no medication side effects noted. Use of agents associated with hypertension: none. Home blood pressure monitoring:     Hyperlipidemia:  Not on statin  On aspirin 81 mg    Social History     Tobacco Use    Smoking status: Current Every Day Smoker     Packs/day: 0.75     Years: 20.00     Pack years: 15.00     Types: Cigarettes    Smokeless tobacco: Never Used   Substance Use Topics    Alcohol use: No      Physical Exam  /62  Pulse 95  There were no vitals taken for this visit. There is no height or weight on file to calculate BMI.   Wt Readings from Last 3 Encounters:   04/27/22 175 lb (79.4 kg)   04/13/22 176 lb 12.8 oz (80.2 kg)   03/30/22 174 lb 12.8 oz (79.3 kg)      BP Readings from Last 3 Encounters:   04/27/22 134/80   04/13/22 116/64   03/30/22 112/60        Pertinent Labs:  Lab Results   Component Value Date    LABA1C 7.8 03/04/2022    LABA1C 7.5 09/03/2021    LABA1C 8.0 01/08/2021      Lab Results   Component Value Date    MALBCR see below 09/03/2021    LABMICR <1.20 09/03/2021     No results found for: [facility]   Lab Results   Component Value Date    LDLCALC 139 (H) 09/03/2021    CHOL 234 (H) 09/03/2021    TRIG 87 09/03/2021    HDL 78 (H) 09/03/2021     Lab Results   Component Value Date    CREATININE 0.7 09/03/2021    BUN 20 09/03/2021     09/03/2021    K 5.3 (H) 09/03/2021     09/03/2021    CO2 23 09/03/2021     Lab Results   Component Value Date    AST 18 09/03/2021    ALT 16 09/03/2021    BILITOT 0.4 09/03/2021    ALKPHOS 71 09/03/2021     No components found for: VITB12  Lab Results   Component Value Date    TSH 0.76 09/03/2021        No Known Allergies  Immunization History   Administered Date(s) Administered    COVID-19, Deborah Jose, Primary or Immunocompromised, PF, 100mcg/0.5mL 03/19/2021, 04/16/2021, 02/17/2022    Influenza Virus Vaccine 03/10/2014, 11/24/2014, 11/17/2015    Influenza, Quadv, IM, PF (6 mo and older Fluzone, Flulaval, Fluarix, and 3 yrs and older Afluria) 12/12/2016    Influenza, Triv, inactivated, subunit, adjuvanted, IM (Fluad 65 yrs and older) 11/18/2019    Pneumococcal Conjugate 13-valent ([id number]) 06/14/2016    Pneumococcal Polysaccharide ([id number]) 03/10/2014, 05/20/2019    Tdap (Boostrix, Adacel) 04/09/2013     Current Outpatient Medications on File Prior to Visit   Medication Sig Dispense Refill    LEVEMIR FLEXTOUCH 100 UNIT/ML injection pen Inject 8 Units into the skin nightly       zolpidem (AMBIEN) 10 MG tablet TAKE ONE TABLET BY MOUTH ONCE NIGHTLY AS NEEDED FOR SLEEP 90 tablet 1    Continuous Blood Gluc Sensor (FREESTYLE ANDRES 2 SENSOR) MISC Use to check blood sugar before meals and at bedtime 6 each 3    hydroCHLOROthiazide (MICROZIDE) 12.5 MG capsule Take 1 capsule by mouth every morning 90 capsule 3    metFORMIN (GLUCOPHAGE) 1000 MG tablet Take 1 tablet by mouth 2 times daily (with meals) 180 tablet 3    losartan (COZAAR) 100 MG tablet Take 1 tablet by mouth daily 90 tablet 3    Insulin Pen Needle (RELION PEN NEEDLES) 32G X 4 MM MISC Inject 1 each into the skin 2 times daily 100 each 2    ibuprofen (ADVIL;MOTRIN) 600 MG tablet TAKE 1 TABLET BY MOUTH THREE TIMES DAILY AS NEEDED FOR PAIN 90 tablet 2    Insulin Syringes, Disposable, U-100 1 ML MISC 1 each by Does not apply route daily 100 each 3    glucose monitoring kit (FREESTYLE) monitoring kit 1 kit by Does not apply route daily as needed 1 kit 0    aspirin 81 MG tablet Take 81 mg by mouth daily.       [DISCONTINUED] insulin glargine (BASAGLAR KWIKPEN) 100 UNIT/ML injection pen Inject 14 Units into the skin nightly (Patient not taking: Reported on 5/11/2022) 5 pen 0    [DISCONTINUED] Dulaglutide (TRULICITY) 4.27 MJ/9.5RK SOPN Inject 0.75 mg into the skin once a week 4 pen 1    [DISCONTINUED] blood glucose test strips (FREESTYLE LITE) strip USE 1 STRIP TO CHECK GLUCOSE THREE TIMES DAILY (E11.9 - ICD 10) (Patient not taking: Reported on 5/11/2022) 100 each 1    [DISCONTINUED] Blood Glucose Monitoring Suppl (FREESTYLE LITE) KERMIT USE AS DIRECTED (Patient not taking: Reported on 5/11/2022) 1 Device 0    [DISCONTINUED] albuterol sulfate HFA (PROVENTIL HFA) 108 (90 Base) MCG/ACT inhaler Inhale 2 puffs into the lungs every 6 hours as needed for Wheezing (Patient not taking: Reported on 1/5/2022) 1 Inhaler 3    [DISCONTINUED] FREESTYLE LANCETS MISC 1 each by Does not apply route daily 100 each 3    [DISCONTINUED] Blood Glucose Monitoring Suppl (ONE TOUCH BASIC SYSTEM) W/DEVICE KIT 1 Device by Does not apply route. (Patient not taking: Reported on 4/27/2022) 1 kit 0     No current facility-administered medications on file prior to visit. Medication list reviewed and updated. The 10-year ASCVD risk score (Fanny Denney, et al., 2013) is: 57.6%    Values used to calculate the score:      Age: 71 years      Sex: Female      Is Non- : Yes      Diabetic: Yes      Tobacco smoker: Yes      Systolic Blood Pressure: 902 mmHg      Is BP treated: Yes      HDL Cholesterol: 78 mg/dL      Total Cholesterol: 234 mg/dL    Assessment/Plan:   Type 2 Diabetes  A1c above goal. Repeat A1c next visit. Hypoglycemia improving since decr levemir. FBG at goal, occ post prandial elevations. Comorbities include:  ASCVD  Considerations include: minimize hypoglycemia, cost of medication  -Medications:    Continue Levemir 8 units QD--> still has 120 ds left. Approved for basaglar through [employer].  Will switch if patient still requires long acting insulin when done with current supply     Continue metformin 1g BID   Continue Trulicity to 8.05 mg weekly for now (lillycares). Pt would like to continue lower dose for another 4 weeks before considering dose increase. Discuss that she may be able to stop insulin if she is able to increase trulicity dose.   -Assisted with application of jak. Pt to scan BG at least every 8 hours.   -Labs ordered: none  -Combine carbs with protein (plain greek yogurt, cheese, peanut butter, nuts, eggs, chix, fish, etc)  -Eat a small breakfast.    Health Maintenance Due   Topic Date Due    Shingles vaccine (1 of 2) Never done    Diabetic retinal exam  04/23/2020     Hypertension  BP at goal <130/80 without albuminuria  Medications: ACE  Labs: none    Hyperlipidemia  Reviewed ASCVD risk score. Start atorvastatin 40 mg QD - counseled re: dosing, instructions, benefits of therapy, possible side effects. Labs: FLP annually    Education provided:  Discussed general issues about diabetes pathophysiology and management. Reviewed A1c and blood sugar goals with patient  Counseled on changes to medication regimen and common side effects  Reviewed [employer] application and use. Provided insulin and GLP1RA teaching  Discussed symptoms and management of hypoglycemic episodes. Encouraged aerobic exercise   Educated on diet    Follow up: 4 wk    Discussed with patient the Pharmacist Collaborative Practice Agreement. Patient provided verbal and/or electronic (ex. Dec[employer]on) consent to participate in the collaborative practice agreement between the pharmacist and referred patient. This is in lieu of paper consent due to COVID-19 precautions and the use of remote/virtual visits. Time spent with patient:  60 min    Fidelia Capone, PharmD, [facility] Medication Management  PH: Jami  66. in place:   Yes   Recommendation Provided To: Patient/Caregiver: 4 via In person   Intervention Detail: Device Training, New Rx: 1, reason: Needs Additional Therapy, Refill(s) Provided and Scheduled Appointment   Gap Closed?: No    Intervention Accepted By: Patient/Caregiver: 4   Time Spent (min): 60

## 2022-06-08 ENCOUNTER — OFFICE VISIT (OUTPATIENT)
Dept: INTERNAL MEDICINE CLINIC | Age: 72
End: 2022-06-08

## 2022-06-08 VITALS
TEMPERATURE: 97.4 F | WEIGHT: 181.8 LBS | HEIGHT: 66 IN | SYSTOLIC BLOOD PRESSURE: 120 MMHG | BODY MASS INDEX: 29.22 KG/M2 | DIASTOLIC BLOOD PRESSURE: 66 MMHG

## 2022-06-08 DIAGNOSIS — E78.5 TYPE 2 DIABETES MELLITUS WITH HYPERLIPIDEMIA (HCC): Primary | ICD-10-CM

## 2022-06-08 DIAGNOSIS — E11.69 TYPE 2 DIABETES MELLITUS WITH HYPERLIPIDEMIA (HCC): Primary | ICD-10-CM

## 2022-06-08 LAB — HBA1C MFR BLD: 7 %

## 2022-06-08 NOTE — PROGRESS NOTES
Suma Hameed is a 71 y.o. female with PMHX significant for CAD, HTN and Type 2 Diabetes referred by Dr Gene Jacobson for diabetes counseling and medication management. Interval update:  Started trulicity 7.63 mg on 4/82/09. Reduced levemir to 8 units and no low BGs since dose decreased. Past Medical History:   Diagnosis Date    Cataract 6/23/2015    COVID-19     History of acute sinusitis 12/04/09    last OV    Hypertension     Microcytic anemia     Rotator cuff syndrome of right shoulder 2/4/2014    Type II or unspecified type diabetes mellitus without mention of complication, not stated as uncontrolled      Treatment Adherence:  Diet: 1-2 meals per day, snacks on oranges, banana, potato chips, grapes, eating more strawberries because they dont spike her BG as high as grapes   Lunch 12p   Dinner 7p  Exercise: no regular exercise, but walks in summer, has not started yet. Weight trend: stable  Barriers: cost of medication (now has pt assistance), strips $178 so using jak instead    Type 2 Diabetes Mellitus  Current symptoms/problems include none. Episodes of hypoglycemia? yes - has BG <70 about 2-3 times per week in the morning. Eye exam current (within one year): unknown  Tobacco history: She  reports that she has been smoking cigarettes. She has a 15.00 pack-year smoking history. She has never used smokeless tobacco. not interested in quitting now. Hasn't thought about it. Triggers include meals and coffee, she smokes 3/4 PPD, not smoking the whole cigarette bc they don't taste as good. Current diabetes medications: levemir 8 units QAM (4 pens left- 120 ds), metformin 1g BID, Trulicity 9.92 mg weekly (~8 more pens)  Compliance:  compliant all of the time - sometimes skips PM dose of metformin if PM BG is low  Side effects:  Feels \"funny\" on 2nd day after trulicity injections, now resolved but worried about increasing trulicity dose.   Cost: free/affordable   Previous treatment regimens / response: states carrera dropped her BG down to 30. Prescribed ozempic, but $500    Home blood sugar records:   Vianney  5/25-6/8/22  avg 134  V high 1%  High 9%  Target 90%  Several episodes of BGs in 60s     Hypertension:     On Ace/ARB- losartan  Medication side effects: no medication side effects noted. Use of agents associated with hypertension: none. Home blood pressure monitoring:     Hyperlipidemia:  atorva 40mg  On aspirin 81 mg    Physical Exam  /66 (Site: Left Upper Arm, Position: Sitting)   Temp 97.4 °F (36.3 °C) (Temporal)   Ht 5' 6\" (1.676 m)   Wt 181 lb 12.8 oz (82.5 kg)   BMI 29.34 kg/m²    Body mass index is 29.34 kg/m². Wt Readings from Last 3 Encounters:   06/08/22 181 lb 12.8 oz (82.5 kg)   04/27/22 175 lb (79.4 kg)   04/13/22 176 lb 12.8 oz (80.2 kg)      BP Readings from Last 3 Encounters:   06/08/22 120/66   04/27/22 134/80   04/13/22 116/64        Pertinent Labs:  Lab Results   Component Value Date    LABA1C 7.0 06/08/2022    LABA1C 7.8 03/04/2022    LABA1C 7.5 09/03/2021      Lab Results   Component Value Date    MALBCR see below 09/03/2021    LABMICR <1.20 09/03/2021     No results found for: CRCLEARANCE   Lab Results   Component Value Date    LDLCALC 139 (H) 09/03/2021    CHOL 234 (H) 09/03/2021    TRIG 87 09/03/2021    HDL 78 (H) 09/03/2021     Lab Results   Component Value Date    CREATININE 0.7 09/03/2021    BUN 20 09/03/2021     09/03/2021    K 5.3 (H) 09/03/2021     09/03/2021    CO2 23 09/03/2021     Lab Results   Component Value Date    AST 18 09/03/2021    ALT 16 09/03/2021    BILITOT 0.4 09/03/2021    ALKPHOS 71 09/03/2021     No components found for: VITB12  Lab Results   Component Value Date    TSH 0.76 09/03/2021        Medication list reviewed and updated.      The 10-year ASCVD risk score (Talib Gaona, et al., 2013) is: 50.3%    Values used to calculate the score:      Age: 71 years      Sex: Female Is Non- : Yes      Diabetic: Yes      Tobacco smoker: Yes      Systolic Blood Pressure: 435 mmHg      Is BP treated: Yes      HDL Cholesterol: 78 mg/dL      Total Cholesterol: 234 mg/dL    Assessment/Plan:   Type 2 Diabetes  A1c 7%  Still c/o hypoglycemia  Comorbities:  ASCVD  Considerations: minimize hypoglycemia, cost of medication  -Medications:    metformin 1g BID + Trulicity to 9.17 mg weekly ([facility]). Trial off Levemir. Approved for basaglar through [email or web address]. Will switch if patient still requires long acting insulin. -start walking 2-3 days per week. Health Maintenance Due   Topic Date Due    Shingles vaccine (1 of 2) Never done    Diabetic retinal exam  04/23/2020     Hypertension  BP at goal <130/80 without albuminuria  Medications: ACE    Hyperlipidemia  Reviewed ASCVD risk score. atorvastatin 40 mg QD    Education provided:  Reviewed A1c and blood sugar goals with patient  Counseled on changes to medication regimen and common side effects  Discussed symptoms and management of hypoglycemic episodes. Encouraged aerobic exercise   Educated on diet    Follow up: 4 wk    Discussed with patient the Pharmacist Collaborative Practice Agreement. Patient provided verbal and/or electronic (ex. Cinposthart) consent to participate in the collaborative practice agreement between the pharmacist and referred patient. This is in lieu of paper consent due to COVID-19 precautions and the use of remote/virtual visits. Time spent with patient:  30 min    Jaya Fall, PharmD, [facility] Medication Management  PH: [address] in place:   Yes   Recommendation Provided To: Patient/Caregiver: 3 via In person   Intervention Detail: Discontinued Rx: 1, reason: Therapy De-escalation, Lab(s) Ordered and Scheduled Appointment   Gap Closed?: No    Intervention Accepted By: Patient/Caregiver: 3   Time Spent (min): 30

## 2022-07-06 ENCOUNTER — OFFICE VISIT (OUTPATIENT)
Dept: INTERNAL MEDICINE CLINIC | Age: 72
End: 2022-07-06

## 2022-07-06 VITALS
BODY MASS INDEX: 26.99 KG/M2 | TEMPERATURE: 97.9 F | RESPIRATION RATE: 16 BRPM | WEIGHT: 167.2 LBS | DIASTOLIC BLOOD PRESSURE: 64 MMHG | OXYGEN SATURATION: 99 % | SYSTOLIC BLOOD PRESSURE: 118 MMHG | HEART RATE: 104 BPM

## 2022-07-06 DIAGNOSIS — E11.69 TYPE 2 DIABETES MELLITUS WITH HYPERLIPIDEMIA (HCC): ICD-10-CM

## 2022-07-06 DIAGNOSIS — E78.5 TYPE 2 DIABETES MELLITUS WITH HYPERLIPIDEMIA (HCC): ICD-10-CM

## 2022-07-06 DIAGNOSIS — E11.59 HYPERTENSION ASSOCIATED WITH DIABETES (HCC): ICD-10-CM

## 2022-07-06 DIAGNOSIS — I15.2 HYPERTENSION ASSOCIATED WITH DIABETES (HCC): ICD-10-CM

## 2022-07-06 NOTE — PROGRESS NOTES
Garcia Beck is a 71 y.o. female with PMHX significant for CAD, HTN and Type 2 Diabetes referred by Dr Parth Medellin for diabetes counseling and medication management. ASSESSMENT/PLAN  1. Type 2 Diabetes  A1c 7%  Still c/o hypoglycemia after d/c of levemir  -Continue Trulicity 5.82 mg weekly ([facility])  -Decrease metformin to 1g QAM. If still having lows after 2 weeks, then stop.   -walk 2-3 days per week. -Annual Labs due Sept     2. Hypertension  BP at goal <130/80 without albuminuria  *Consider dc hctz next visit   Continue ACEi    3. Hyperlipidemia  Reviewed ASCVD risk score. Continue atorvastatin 40 mg QD    Follow up: 4 wk        SUBJECTIVE/OBJECTIVE  Treatment Adherence:  Diet: 1-2 meals per day (12p, 7p), snacks on a lot of fruit. Decrease in appetite since starting trulicity  Exercise: no regular exercise  Weight trend: decreasing past month  Barriers: cost of medication (has pt assistance), strips $178 so using jak instead    Type 2 Diabetes Mellitus  Comorbities:  ASCVD  Considerations: minimize hypoglycemia, cost of medication  Current symptoms/problems include none. Episodes of hypoglycemia? yes - has BG <70 about 2-3 times per week in the morning, even after d/c of insulin  Eye exam current (within one year): unknown  Tobacco history: She  reports that she has been smoking cigarettes. She has a 15.00 pack-year smoking history. She has never used smokeless tobacco. not interested in quitting now. Hasn't thought about it. Triggers include meals and coffee, she smokes 3/4 PPD, not smoking the whole cigarette bc they don't taste as good. Current diabetes medications:  metformin 1g BID, Trulicity 3.30 mg weekly   Compliance:  compliant all of the time - sometimes skips PM dose of metformin if PM BG is low  Side effects:  Feels \"funny\" on 2nd day after trulicity injections, now resolved but worried about increasing trulicity dose.   Cost: free/affordable   Previous treatment regimens / response: states carrera dropped her BG down to 30. Prescribed ozempic, but $500    Home blood sugar records:   Vianney 7/6/22  avg 124  High 7%  Target 92%  Low 1%    Hypertension:     On Ace/ARB- losartan  Medication side effects: none. Use of agents associated with hypertension: none. Home blood pressure monitoring: none    Hyperlipidemia:  atorva 40mg  On aspirin 81 mg    Physical Exam  /64   Pulse (!) 104   Temp 97.9 °F (36.6 °C)   Resp 16   Wt 167 lb 3.2 oz (75.8 kg)   SpO2 99%   Breastfeeding No   BMI 26.99 kg/m²    Body mass index is 26.99 kg/m². Wt Readings from Last 3 Encounters:   07/06/22 167 lb 3.2 oz (75.8 kg)   06/08/22 181 lb 12.8 oz (82.5 kg)   04/27/22 175 lb (79.4 kg)      BP Readings from Last 3 Encounters:   07/06/22 118/64   06/08/22 120/66   04/27/22 134/80        Pertinent Labs:  Lab Results   Component Value Date    LABA1C 7.0 06/08/2022    LABA1C 7.8 03/04/2022    LABA1C 7.5 09/03/2021      Lab Results   Component Value Date    MALBCR see below 09/03/2021    LABMICR <1.20 09/03/2021     No results found for: CRCLEARANCE   Lab Results   Component Value Date    LDLCALC 139 (H) 09/03/2021    CHOL 234 (H) 09/03/2021    TRIG 87 09/03/2021    HDL 78 (H) 09/03/2021     Lab Results   Component Value Date    CREATININE 0.7 09/03/2021    BUN 20 09/03/2021     09/03/2021    K 5.3 (H) 09/03/2021     09/03/2021    CO2 23 09/03/2021     Lab Results   Component Value Date    AST 18 09/03/2021    ALT 16 09/03/2021    BILITOT 0.4 09/03/2021    ALKPHOS 71 09/03/2021     No components found for: VITB12  Lab Results   Component Value Date    TSH 0.76 09/03/2021        Medication list reviewed and updated.      The 10-year ASCVD risk score (Olive Gruber et al., 2013) is: 49.2%    Values used to calculate the score:      Age: 71 years      Sex: Female      Is Non- : Yes      Diabetic: Yes      Tobacco smoker: Yes      Systolic Blood Pressure: 432 mmHg      Is BP treated: Yes      HDL Cholesterol: 78 mg/dL      Total Cholesterol: 234 mg/dL    Discussed with patient the Pharmacist Collaborative Practice Agreement. Patient provided verbal and/or electronic (ex. mychart) consent to participate in the collaborative practice agreement between the pharmacist and referred patient. This is in lieu of paper consent due to COVID-19 precautions and the use of remote/virtual visits. Time spent with patient:  30 min    Billy IqbalD, [facility] Medication Management  PH: [facility] in [facility]:   Yes   Recommendation Provided To: Patient/Caregiver: 2 via In person   Intervention Detail: Dose Adjustment: 1, reason: Therapy De-escalation and Scheduled Appointment   Gap Closed?: No    Intervention Accepted By: Patient/Caregiver: 2   Time Spent (min): 30

## 2022-07-06 NOTE — PATIENT INSTRUCTIONS
Decrease metformin to 1000 mg every morning with food. If still having lows after 2 weeks, then stop metformin altogether.

## 2022-08-01 RX ORDER — DULAGLUTIDE 0.75 MG/.5ML
0.75 INJECTION, SOLUTION SUBCUTANEOUS WEEKLY
Qty: 12 PEN | Refills: 3 | Status: SHIPPED | OUTPATIENT
Start: 2022-08-01

## 2022-08-01 NOTE — PROGRESS NOTES
For [id number] in place:  Yes  Recommendation Provided To: Pharmacy: 1  Intervention Detail: Refill(s) Provided  Gap Closed?: No   Intervention Accepted By: Pharmacy: 1  Time Spent (min): 5

## 2022-08-03 ENCOUNTER — OFFICE VISIT (OUTPATIENT)
Dept: INTERNAL MEDICINE CLINIC | Age: 72
End: 2022-08-03

## 2022-08-03 VITALS — WEIGHT: 165.2 LBS | DIASTOLIC BLOOD PRESSURE: 60 MMHG | SYSTOLIC BLOOD PRESSURE: 106 MMHG | BODY MASS INDEX: 26.66 KG/M2

## 2022-08-03 DIAGNOSIS — E11.69 TYPE 2 DIABETES MELLITUS WITH HYPERLIPIDEMIA (HCC): Primary | ICD-10-CM

## 2022-08-03 DIAGNOSIS — E78.5 TYPE 2 DIABETES MELLITUS WITH HYPERLIPIDEMIA (HCC): Primary | ICD-10-CM

## 2022-08-03 NOTE — PROGRESS NOTES
CLINICAL PHARMACY NOTE--Diabetes    Hilaria Spear is a 71 y.o. female with PMHX significant for CAD, HTN and Type 2 Diabetes referred by Dr Evelyn Lopez for diabetes counseling and medication management. ASSESSMENT/PLAN  1. Type 2 Diabetes  M3U 7%  -Trulicity 3.69 mg weekly ([facility])  -metformin 1g QAM  -walk 2-3 days per week. -Annual Labs due Sept     2. Hypertension  BP at goal <130/80 without albuminuria  *Consider dc hctz   Continue ACEi    3. Hyperlipidemia  Reviewed ASCVD risk score. Continue atorvastatin 40 mg QD    Follow up: 8 wk        SUBJECTIVE/OBJECTIVE  Treatment Adherence:  Diet: 1-2 meals per day (12p, 7p), snacks on a lot of fruit. Decrease in appetite since starting trulicity  Exercise: no regular exercise  Weight trend: decreasing past month  Barriers: cost of medication (has pt assistance), strips $178 so using jak instead    Type 2 Diabetes Mellitus  Comorbities:  ASCVD  Considerations: minimize hypoglycemia, cost of medication  Current symptoms/problems include none. Episodes of hypoglycemia? None since dc insulin  Eye exam current (within one year): unknown  Tobacco history: She  reports that she has been smoking cigarettes. She has a 15.00 pack-year smoking history. She has never used smokeless tobacco. not interested in quitting now. Hasn't thought about it. Triggers include meals and coffee, she smokes 3/4 PPD, not smoking the whole cigarette bc they don't taste as good. Current diabetes medications:  metformin 1g QD, Trulicity 6.78 mg weekly   Compliance:  compliant all of the time   Side effects:  Feels \"funny\" on 2nd day after trulicity injections, now resolved but worried about increasing trulicity dose. Cost: free/affordable   Previous treatment regimens / response: states lantus dropped her BG down to 30. Prescribed ozempic, but $500    Home blood sugar records:   Vianney 8/3/22  avg 159  V high 3%  High 22%  Target 75%  Low 0%    Hypertension:      On Ace/ARB- losartan  Medication side effects: none. Use of agents associated with hypertension: none. Home blood pressure monitoring: none    Hyperlipidemia:  atorva 40mg  On aspirin 81 mg    Physical Exam  /60 (Site: Left Upper Arm, Position: Sitting, Cuff Size: Large Adult)   Wt 165 lb 3.2 oz (74.9 kg)   BMI 26.66 kg/m²    Body mass index is 26.66 kg/m². Wt Readings from Last 3 Encounters:   08/03/22 165 lb 3.2 oz (74.9 kg)   07/06/22 167 lb 3.2 oz (75.8 kg)   06/08/22 181 lb 12.8 oz (82.5 kg)      BP Readings from Last 3 Encounters:   08/03/22 106/60   07/06/22 118/64   06/08/22 120/66        Pertinent Labs:  Lab Results   Component Value Date    LABA1C 7.0 06/08/2022    LABA1C 7.8 03/04/2022    LABA1C 7.5 09/03/2021      Lab Results   Component Value Date    MALBCR see below 09/03/2021    LABMICR <1.20 09/03/2021     No results found for: CRCLEARANCE   Lab Results   Component Value Date    LDLCALC 139 (H) 09/03/2021    CHOL 234 (H) 09/03/2021    TRIG 87 09/03/2021    HDL 78 (H) 09/03/2021     Lab Results   Component Value Date    CREATININE 0.7 09/03/2021    BUN 20 09/03/2021     09/03/2021    K 5.3 (H) 09/03/2021     09/03/2021    CO2 23 09/03/2021     Lab Results   Component Value Date    AST 18 09/03/2021    ALT 16 09/03/2021    BILITOT 0.4 09/03/2021    ALKPHOS 71 09/03/2021     No components found for: VITB12  Lab Results   Component Value Date    TSH 0.76 09/03/2021        Medication list reviewed and updated. The 10-year ASCVD risk score (Lonza Angela., et al., 2013) is: 42.5%    Values used to calculate the score:      Age: 71 years      Sex: Female      Is Non- : Yes      Diabetic: Yes      Tobacco smoker: Yes      Systolic Blood Pressure: 067 mmHg      Is BP treated: Yes      HDL Cholesterol: 78 mg/dL      Total Cholesterol: 234 mg/dL    Discussed with patient the Pharmacist Collaborative Practice Agreement.   Patient provided verbal and/or electronic (Jeffrey Marte) consent to participate in the collaborative practice agreement between the pharmacist and referred patient. This is in lieu of paper consent due to COVID-19 precautions and the use of remote/virtual visits.      Time spent with patient:  27 min    Felipe Babb, PharmD, [facility] Medication Management  PH: [phone] in place:  Yes  Recommendation Provided To: Patient/Caregiver: 2 via In person  Intervention Detail: Lab(s) Ordered and Scheduled Appointment  Gap Closed?: No   Intervention Accepted By: Patient/Caregiver: 2  Time Spent (min): 30

## 2022-09-12 ENCOUNTER — OFFICE VISIT (OUTPATIENT)
Dept: INTERNAL MEDICINE CLINIC | Age: 72
End: 2022-09-12
Payer: MEDICARE

## 2022-09-12 VITALS
TEMPERATURE: 97.4 F | HEIGHT: 66 IN | OXYGEN SATURATION: 99 % | HEART RATE: 83 BPM | SYSTOLIC BLOOD PRESSURE: 112 MMHG | DIASTOLIC BLOOD PRESSURE: 70 MMHG | WEIGHT: 162 LBS | BODY MASS INDEX: 26.03 KG/M2

## 2022-09-12 DIAGNOSIS — Z23 NEED FOR SHINGLES VACCINE: ICD-10-CM

## 2022-09-12 DIAGNOSIS — E78.5 TYPE 2 DIABETES MELLITUS WITH HYPERLIPIDEMIA (HCC): Primary | ICD-10-CM

## 2022-09-12 DIAGNOSIS — I15.2 HYPERTENSION ASSOCIATED WITH DIABETES (HCC): ICD-10-CM

## 2022-09-12 DIAGNOSIS — E11.59 HYPERTENSION ASSOCIATED WITH DIABETES (HCC): ICD-10-CM

## 2022-09-12 DIAGNOSIS — E11.69 TYPE 2 DIABETES MELLITUS WITH HYPERLIPIDEMIA (HCC): Primary | ICD-10-CM

## 2022-09-12 PROCEDURE — 3051F HG A1C>EQUAL 7.0%<8.0%: CPT | Performed by: INTERNAL MEDICINE

## 2022-09-12 PROCEDURE — 2022F DILAT RTA XM EVC RTNOPTHY: CPT | Performed by: INTERNAL MEDICINE

## 2022-09-12 PROCEDURE — 3017F COLORECTAL CA SCREEN DOC REV: CPT | Performed by: INTERNAL MEDICINE

## 2022-09-12 PROCEDURE — 4004F PT TOBACCO SCREEN RCVD TLK: CPT | Performed by: INTERNAL MEDICINE

## 2022-09-12 PROCEDURE — 1123F ACP DISCUSS/DSCN MKR DOCD: CPT | Performed by: INTERNAL MEDICINE

## 2022-09-12 PROCEDURE — G8417 CALC BMI ABV UP PARAM F/U: HCPCS | Performed by: INTERNAL MEDICINE

## 2022-09-12 PROCEDURE — G8399 PT W/DXA RESULTS DOCUMENT: HCPCS | Performed by: INTERNAL MEDICINE

## 2022-09-12 PROCEDURE — 1090F PRES/ABSN URINE INCON ASSESS: CPT | Performed by: INTERNAL MEDICINE

## 2022-09-12 PROCEDURE — G8427 DOCREV CUR MEDS BY ELIG CLIN: HCPCS | Performed by: INTERNAL MEDICINE

## 2022-09-12 PROCEDURE — 99214 OFFICE O/P EST MOD 30 MIN: CPT | Performed by: INTERNAL MEDICINE

## 2022-09-12 RX ORDER — ZOLPIDEM TARTRATE 10 MG/1
TABLET ORAL
COMMUNITY
Start: 2022-07-09 | End: 2022-10-10

## 2022-09-12 RX ORDER — ZOSTER VACCINE RECOMBINANT, ADJUVANTED 50 MCG/0.5
0.5 KIT INTRAMUSCULAR SEE ADMIN INSTRUCTIONS
Qty: 0.5 ML | Refills: 0 | Status: SHIPPED | OUTPATIENT
Start: 2022-09-12 | End: 2022-09-13

## 2022-09-12 NOTE — PROGRESS NOTES
Chief Complaint   Patient presents with    Hypertension    Diabetes     Assessment/Plan:  Marylene Shallow was seen today for hypertension and diabetes. Diagnoses and all orders for this visit:    Type 2 diabetes mellitus with hyperlipidemia ([facility] [address]  -     Lipid Panel; Future    Hypertension associated with diabetes ([facility] [address]  -      DIABETES FOOT EXAM  -     External Referral To Ophthalmology  -     CBC with Auto Differential; Future    Need for shingles vaccine  -     zoster recombinant adjuvanted vaccine (SHINGRIX) 50 MCG/0.5ML SUSR injection; Inject 0.5 mLs into the muscle See Admin Instructions for 1 day    Vitals:    09/12/22 1024   BP: 112/70   Pulse: 83   Temp: 97.4 °F (36.3 °C)   SpO2: 99%       Physical Exam  Vitals and nursing note reviewed. Constitutional:       General: She is not in acute distress. Appearance: She is well-developed. She is not diaphoretic. HENT:      Head: Normocephalic and atraumatic. Cardiovascular:      Rate and Rhythm: Normal rate and regular rhythm. Heart sounds: Normal heart sounds. No murmur heard. No friction rub. No gallop. Pulmonary:      Effort: Pulmonary effort is normal. No respiratory distress. Breath sounds: Normal breath sounds. No wheezing or rales. Chest:      Chest wall: No tenderness. Musculoskeletal:      Comments: No calluses or open lesions   Skin:     General: Skin is warm. Findings: No erythema or rash. Neurological:      Mental Status: She is alert and oriented to person, place, and time. Cranial Nerves: No cranial nerve deficit. Comments: Normal sensation to microfilament bilaterally. Psychiatric:         Behavior: Behavior normal.         Thought Content:  Thought content normal.         Judgment: Judgment normal.       PHQ Scores 9/12/2022 3/4/2022 9/3/2021 1/8/2021 8/28/2020 2/18/2019 8/21/2018   PHQ2 Score 0 0 0 0 0 0 0   PHQ9 Score 0 0 0 0 0 0 0     Interpretation of Total Score Depression Severity: 1-4 = Minimal depression, 5-9 = Mild depression, 10-14 = Moderate depression, 15-19 = Moderately severe depression, 20-27 = Severe depression    MD Loki Rocha [place]

## 2022-09-15 DIAGNOSIS — I15.2 HYPERTENSION ASSOCIATED WITH DIABETES (HCC): ICD-10-CM

## 2022-09-15 DIAGNOSIS — E78.5 TYPE 2 DIABETES MELLITUS WITH HYPERLIPIDEMIA (HCC): ICD-10-CM

## 2022-09-15 DIAGNOSIS — E11.59 HYPERTENSION ASSOCIATED WITH DIABETES (HCC): ICD-10-CM

## 2022-09-15 DIAGNOSIS — E11.69 TYPE 2 DIABETES MELLITUS WITH HYPERLIPIDEMIA (HCC): ICD-10-CM

## 2022-09-16 LAB
BASOPHILS ABSOLUTE: 0.2 K/UL (ref 0–0.2)
BASOPHILS RELATIVE PERCENT: 1.7 %
CHOLESTEROL, TOTAL: 165 MG/DL (ref 0–199)
EOSINOPHILS ABSOLUTE: 0.5 K/UL (ref 0–0.6)
EOSINOPHILS RELATIVE PERCENT: 5.3 %
HCT VFR BLD CALC: 30.6 % (ref 36–48)
HDLC SERPL-MCNC: 63 MG/DL (ref 40–60)
HEMOGLOBIN: 10 G/DL (ref 12–16)
LDL CHOLESTEROL CALCULATED: 84 MG/DL
LYMPHOCYTES ABSOLUTE: 2.7 K/UL (ref 1–5.1)
LYMPHOCYTES RELATIVE PERCENT: 29.3 %
MCH RBC QN AUTO: 25.6 PG (ref 26–34)
MCHC RBC AUTO-ENTMCNC: 32.6 G/DL (ref 31–36)
MCV RBC AUTO: 78.5 FL (ref 80–100)
MONOCYTES ABSOLUTE: 0.6 K/UL (ref 0–1.3)
MONOCYTES RELATIVE PERCENT: 6.3 %
NEUTROPHILS ABSOLUTE: 5.3 K/UL (ref 1.7–7.7)
NEUTROPHILS RELATIVE PERCENT: 57.4 %
PDW BLD-RTO: 16.5 % (ref 12.4–15.4)
PLATELET # BLD: 243 K/UL (ref 135–450)
PMV BLD AUTO: 10.5 FL (ref 5–10.5)
RBC # BLD: 3.9 M/UL (ref 4–5.2)
TRIGL SERPL-MCNC: 89 MG/DL (ref 0–150)
VLDLC SERPL CALC-MCNC: 18 MG/DL
WBC # BLD: 9.2 K/UL (ref 4–11)

## 2022-10-09 DIAGNOSIS — F51.01 PRIMARY INSOMNIA: Primary | ICD-10-CM

## 2022-10-10 RX ORDER — ZOLPIDEM TARTRATE 10 MG/1
TABLET ORAL
Qty: 90 TABLET | Refills: 0 | Status: SHIPPED | OUTPATIENT
Start: 2022-10-10 | End: 2022-11-09

## 2022-10-12 ENCOUNTER — OFFICE VISIT (OUTPATIENT)
Dept: INTERNAL MEDICINE CLINIC | Age: 72
End: 2022-10-12
Payer: MEDICARE

## 2022-10-12 VITALS
SYSTOLIC BLOOD PRESSURE: 124 MMHG | DIASTOLIC BLOOD PRESSURE: 72 MMHG | HEART RATE: 86 BPM | BODY MASS INDEX: 26.31 KG/M2 | TEMPERATURE: 97.8 F | RESPIRATION RATE: 16 BRPM | WEIGHT: 163 LBS | OXYGEN SATURATION: 99 %

## 2022-10-12 DIAGNOSIS — E78.5 TYPE 2 DIABETES MELLITUS WITH HYPERLIPIDEMIA (HCC): Primary | ICD-10-CM

## 2022-10-12 DIAGNOSIS — E78.5 TYPE 2 DIABETES MELLITUS WITH HYPERLIPIDEMIA (HCC): ICD-10-CM

## 2022-10-12 DIAGNOSIS — E11.69 TYPE 2 DIABETES MELLITUS WITH HYPERLIPIDEMIA (HCC): Primary | ICD-10-CM

## 2022-10-12 DIAGNOSIS — E11.69 TYPE 2 DIABETES MELLITUS WITH HYPERLIPIDEMIA (HCC): ICD-10-CM

## 2022-10-12 LAB
A/G RATIO: 1.7 (ref 1.1–2.2)
ALBUMIN SERPL-MCNC: 4.3 G/DL (ref 3.4–5)
ALP BLD-CCNC: 79 U/L (ref 40–129)
ALT SERPL-CCNC: 16 U/L (ref 10–40)
ANION GAP SERPL CALCULATED.3IONS-SCNC: 12 MMOL/L (ref 3–16)
AST SERPL-CCNC: 15 U/L (ref 15–37)
BILIRUB SERPL-MCNC: 0.4 MG/DL (ref 0–1)
BUN BLDV-MCNC: 27 MG/DL (ref 7–20)
CALCIUM SERPL-MCNC: 9.8 MG/DL (ref 8.3–10.6)
CHLORIDE BLD-SCNC: 103 MMOL/L (ref 99–110)
CO2: 25 MMOL/L (ref 21–32)
CREAT SERPL-MCNC: 1 MG/DL (ref 0.6–1.2)
CREATININE URINE: 177.2 MG/DL (ref 28–259)
GFR AFRICAN AMERICAN: >60
GFR NON-AFRICAN AMERICAN: 55
GLUCOSE BLD-MCNC: 148 MG/DL (ref 70–99)
HBA1C MFR BLD: 7.6 %
MICROALBUMIN UR-MCNC: <1.2 MG/DL
MICROALBUMIN/CREAT UR-RTO: NORMAL MG/G (ref 0–30)
POTASSIUM SERPL-SCNC: 4.5 MMOL/L (ref 3.5–5.1)
SODIUM BLD-SCNC: 140 MMOL/L (ref 136–145)
TOTAL PROTEIN: 6.8 G/DL (ref 6.4–8.2)

## 2022-10-12 PROCEDURE — 83036 HEMOGLOBIN GLYCOSYLATED A1C: CPT | Performed by: INTERNAL MEDICINE

## 2022-10-12 NOTE — PROGRESS NOTES
CLINICAL PHARMACY NOTE--Diabetes    Ludy Witt is a 71 y.o. female with PMHX significant for CAD, HTN and Type 2 Diabetes referred by Dr Jaja Shaw for diabetes counseling and medication management. ASSESSMENT/PLAN  1. Type 2 Diabetes  A1c trending back up, now 0.8%  -Trulicity 8.70 mg weekly ([facility])  -metformin 1g QAM  -pt prefers to work on diet/exercise for another few weeks. Will increase trulicity next visit if no benefit from lifestyle changes.   -walk 2-3 days per week. -CMP and urine ACR today    2. Hypertension  BP at goal <130/80 without albuminuria  *Consider dc hctz   Continue ACEi    3. Hyperlipidemia  Reviewed ASCVD risk score. Continue atorvastatin 40 mg QD    Follow up: 6 wk        SUBJECTIVE/OBJECTIVE  Treatment Adherence:  Diet: 1-2 meals per day (12p, 7p), snacks on a lot of fruit. Decrease in appetite since starting trulicity  Exercise: no regular exercise  Weight trend: decreasing past month  Barriers: cost of medication (has pt assistance), strips $178 so using jak instead    Type 2 Diabetes Mellitus  Comorbities:  ASCVD  Considerations: minimize hypoglycemia, cost of medication  Current symptoms/problems include none. Episodes of hypoglycemia? None since dc insulin  Eye exam current (within one year): unknown  Tobacco history: She  reports that she has been smoking cigarettes. She has a 15.00 pack-year smoking history. She has never used smokeless tobacco. not interested in quitting now. Hasn't thought about it. Triggers include meals and coffee, she smokes 3/4 PPD, not smoking the whole cigarette bc they don't taste as good. Current diabetes medications:  metformin 1g QD, Trulicity 0.28 mg weekly   Compliance:  compliant all of the time   Side effects:  Feels \"funny\" on 2nd day after trulicity injections, now resolved but worried about increasing trulicity dose.   Cost: free/affordable   Previous treatment regimens / response: states debi dropped her BG down to 30. Prescribed ozempic, but $500    Home blood sugar records:   [address] 8/3/22 10/12/22   avg 159  168  V high 3%  5  High 22%  30   Target 75%  64  Low 0%  1    Hypertension:     On Ace/ARB- losartan  Medication side effects: none. Use of agents associated with hypertension: none. Home blood pressure monitoring: none    Hyperlipidemia:  atorva 40mg  On aspirin 81 mg    Physical Exam  /72   Pulse 86   Temp 97.8 °F (36.6 °C) (Temporal)   Resp 16   Wt 163 lb (73.9 kg)   SpO2 99%   BMI 26.31 kg/m²    Body mass index is 26.31 kg/m². Wt Readings from Last 3 Encounters:   10/12/22 163 lb (73.9 kg)   09/12/22 162 lb (73.5 kg)   08/03/22 165 lb 3.2 oz (74.9 kg)      BP Readings from Last 3 Encounters:   10/12/22 124/72   09/12/22 112/70   08/03/22 106/60        Pertinent Labs:  Lab Results   Component Value Date    LABA1C 7.6 10/12/2022    LABA1C 7.0 06/08/2022    LABA1C 7.8 03/04/2022      Lab Results   Component Value Date    MALBCR see below 09/03/2021    LABMICR <1.20 09/03/2021     No results found for: CRCLEARANCE   Lab Results   Component Value Date    LDLCALC 84 09/15/2022    CHOL 165 09/15/2022    TRIG 89 09/15/2022    HDL 63 (H) 09/15/2022     Lab Results   Component Value Date    CREATININE 0.7 09/03/2021    BUN 20 09/03/2021     09/03/2021    K 5.3 (H) 09/03/2021     09/03/2021    CO2 23 09/03/2021     Lab Results   Component Value Date    AST 18 09/03/2021    ALT 16 09/03/2021    BILITOT 0.4 09/03/2021    ALKPHOS 71 09/03/2021     No components found for: VITB12  Lab Results   Component Value Date    TSH 0.76 09/03/2021        Medication list reviewed and updated.      The 10-year ASCVD risk score (Enoch FLORIAN, et al., 2019) is: 40.4%    Values used to calculate the score:      Age: 71 years      Sex: Female      Is Non- : Yes      Diabetic: Yes      Tobacco smoker: Yes      Systolic Blood Pressure: 876 mmHg      Is BP treated: Yes      HDL Cholesterol: 63 mg/dL      Total Cholesterol: 165 mg/dL    Discussed with patient the Pharmacist Collaborative Practice Agreement. Patient provided verbal and/or electronic (ex. mychart) consent to participate in the collaborative practice agreement between the pharmacist and referred patient. This is in lieu of paper consent due to COVID-19 precautions and the use of remote/virtual visits.      Time spent with patient:  27 min    Ortega Franco PharmD, [facility] Medication Management  PH: [phone] in place:  Yes  Recommendation Provided To: Patient/Caregiver: 2 via In person  Intervention Detail: Lab(s) Ordered and Scheduled Appointment  Gap Closed?: No   Intervention Accepted By: Patient/Caregiver: 2  Time Spent (min): 30

## 2022-10-13 DIAGNOSIS — D64.9 ANEMIA, UNSPECIFIED TYPE: Primary | ICD-10-CM

## 2022-10-13 RX ORDER — FERROUS SULFATE 325(65) MG
325 TABLET ORAL
Qty: 90 TABLET | Refills: 1
Start: 2022-10-13

## 2022-10-30 DIAGNOSIS — G89.29 CHRONIC RIGHT-SIDED LOW BACK PAIN WITHOUT SCIATICA: ICD-10-CM

## 2022-10-30 DIAGNOSIS — M54.50 CHRONIC RIGHT-SIDED LOW BACK PAIN WITHOUT SCIATICA: ICD-10-CM

## 2022-10-31 RX ORDER — IBUPROFEN 600 MG/1
TABLET ORAL
Qty: 90 TABLET | Refills: 1 | Status: SHIPPED | OUTPATIENT
Start: 2022-10-31

## 2022-11-07 LAB — DIABETIC RETINOPATHY: POSITIVE

## 2022-11-29 ENCOUNTER — HOSPITAL ENCOUNTER (OUTPATIENT)
Dept: WOMENS IMAGING | Age: 72
Discharge: HOME | End: 2022-11-29
Payer: MEDICARE

## 2022-11-29 VITALS — HEIGHT: 66 IN | BODY MASS INDEX: 27.97 KG/M2 | WEIGHT: 174 LBS

## 2022-11-29 DIAGNOSIS — Z12.31 BREAST CANCER SCREENING BY MAMMOGRAM: ICD-10-CM

## 2022-11-29 PROCEDURE — 77063 BREAST TOMOSYNTHESIS BI: CPT

## 2022-12-07 ENCOUNTER — OFFICE VISIT (OUTPATIENT)
Dept: INTERNAL MEDICINE CLINIC | Age: 72
End: 2022-12-07

## 2022-12-07 VITALS
OXYGEN SATURATION: 99 % | WEIGHT: 164 LBS | HEART RATE: 81 BPM | SYSTOLIC BLOOD PRESSURE: 126 MMHG | TEMPERATURE: 96.9 F | DIASTOLIC BLOOD PRESSURE: 60 MMHG | BODY MASS INDEX: 26.47 KG/M2

## 2022-12-07 DIAGNOSIS — E78.5 TYPE 2 DIABETES MELLITUS WITH HYPERLIPIDEMIA (HCC): Primary | ICD-10-CM

## 2022-12-07 DIAGNOSIS — E11.69 TYPE 2 DIABETES MELLITUS WITH HYPERLIPIDEMIA (HCC): Primary | ICD-10-CM

## 2022-12-07 RX ORDER — DULAGLUTIDE 1.5 MG/.5ML
1.5 INJECTION, SOLUTION SUBCUTANEOUS WEEKLY
Qty: 6 ML | Refills: 4 | Status: SHIPPED | OUTPATIENT
Start: 2022-12-07

## 2022-12-07 NOTE — PROGRESS NOTES
CLINICAL PHARMACY NOTE--Diabetes    Brad Jean is a 71 y.o. female with PMHX significant for CAD, HTN and Type 2 Diabetes referred by Dr Otoniel Jackson for diabetes counseling and medication management. ASSESSMENT/PLAN  1. Type 2 Diabetes  A1c trending back up, now 4.8%  -Increase Trulicity to 1.5 mg weekly (has 6 doses of 0.75mg left) - lillycares PAP completed/sent  -metformin 1g QAM  -walk 2-3 days per week. 2. Hypertension  BP at goal <130/80 without albuminuria  *Consider dc hctz   Continue ACEi    3. Hyperlipidemia  Reviewed ASCVD risk score. Continue atorvastatin 40 mg QD    Follow up: 6 wk        SUBJECTIVE/OBJECTIVE  Treatment Adherence:  Diet: 1-2 meals per day (12p, 7p), snacks on a lot of fruit. Decrease in appetite since starting trulicity  Exercise: no regular exercise  Weight trend: decreasing past month  Barriers: cost of medication (has pt assistance), strips $178 so using jak instead    Type 2 Diabetes Mellitus  Comorbities:  ASCVD  Considerations: minimize hypoglycemia, cost of medication  Current symptoms/problems include none. Episodes of hypoglycemia? None since dc insulin  Eye exam current (within one year): unknown  Tobacco history: She  reports that she has been smoking cigarettes. She has a 15.00 pack-year smoking history. She has never used smokeless tobacco. not interested in quitting now. Hasn't thought about it. Triggers include meals and coffee, she smokes 3/4 PPD, not smoking the whole cigarette bc they don't taste as good. Current diabetes medications:  metformin 1g QD, Trulicity 3.24 mg weekly   Compliance:  compliant all of the time   Side effects:  Feels \"funny\" on 2nd day after trulicity injections, now resolved but worried about increasing trulicity dose. Cost: free/affordable   Previous treatment regimens / response: states lanlashaunus dropped her BG down to 30.  Prescribed ozempic, but $500    Home blood sugar records:   Vianney 8/3/22 10/12/22 12/7/22   avg 159  168  169  V high 3%  5  4   High 22%  30  32  Target 75%  64  64  Low 0%  1    Hypertension:     On Ace/ARB- losartan  Medication side effects: none. Use of agents associated with hypertension: none. Home blood pressure monitoring: none    Hyperlipidemia:  atorva 40mg  On aspirin 81 mg    Physical Exam  /60   Pulse 81   Temp 96.9 °F (36.1 °C) (Infrared)   Wt 164 lb (74.4 kg)   SpO2 99%   BMI 26.47 kg/m²    Body mass index is 26.47 kg/m². Wt Readings from Last 3 Encounters:   12/07/22 164 lb (74.4 kg)   11/29/22 174 lb (78.9 kg)   10/12/22 163 lb (73.9 kg)      BP Readings from Last 3 Encounters:   12/07/22 126/60   10/12/22 124/72   09/12/22 112/70        Pertinent Labs:  Lab Results   Component Value Date    LABA1C 7.6 10/12/2022    LABA1C 7.0 06/08/2022    LABA1C 7.8 03/04/2022      Lab Results   Component Value Date    MALBCR see below 10/12/2022    LABMICR <1.20 10/12/2022     No results found for: CRCLEARANCE   Lab Results   Component Value Date    LDLCALC 84 09/15/2022    CHOL 165 09/15/2022    TRIG 89 09/15/2022    HDL 63 (H) 09/15/2022     Lab Results   Component Value Date    CREATININE 1.0 10/12/2022    BUN 27 (H) 10/12/2022     10/12/2022    K 4.5 10/12/2022     10/12/2022    CO2 25 10/12/2022     Lab Results   Component Value Date    AST 15 10/12/2022    ALT 16 10/12/2022    BILITOT 0.4 10/12/2022    ALKPHOS 79 10/12/2022     No components found for: VITB12  Lab Results   Component Value Date    TSH 0.76 09/03/2021        Medication list reviewed and updated.      The 10-year ASCVD risk score (Enoch FLORIAN, et al., 2019) is: 41.3%    Values used to calculate the score:      Age: 71 years      Sex: Female      Is Non- : Yes      Diabetic: Yes      Tobacco smoker: Yes      Systolic Blood Pressure: 160 mmHg      Is BP treated: Yes      HDL Cholesterol: 63 mg/dL      Total Cholesterol: 165 mg/dL    Discussed with patient the Pharmacist Collaborative Practice Agreement. Patient provided verbal and/or electronic (ex. mychart) consent to participate in the collaborative practice agreement between the pharmacist and referred patient. This is in lieu of paper consent due to COVID-19 precautions and the use of remote/virtual visits.      Time spent with patient:  27 min    Zander Lorenzo, PharmD, [facility] Medication Management  PH: [address] in place:  Yes  Recommendation Provided To: Patient/Caregiver: 3 via In person  Intervention Detail: Dose Adjustment: 1, reason: Therapy Optimization, Patient Access Assistance/Sample Provided, and Scheduled Appointment  Gap Closed?: No   Intervention Accepted By: Patient/Caregiver: 3  Time Spent (min): 45

## 2022-12-26 DIAGNOSIS — F51.01 PRIMARY INSOMNIA: Primary | ICD-10-CM

## 2022-12-26 NOTE — TELEPHONE ENCOUNTER
Recent Visits  Date Type Provider Dept   09/12/22 Office Visit Micheal James MD [facility]   03/04/22 Office Visit Micheal James MD [facility]   09/03/21 Office Visit Micheal James MD [facility]   Showing recent visits within past 540 days with a meds authorizing provider and meeting all other requirements  Future Appointments  Date Type Provider Dept   01/25/23 Appointment Micheal James MD [facility]   Showing future appointments within next 150 days with a meds authorizing provider and meeting all other requirements     12/7/2022

## 2022-12-27 RX ORDER — ZOLPIDEM TARTRATE 10 MG/1
TABLET ORAL
Qty: 90 TABLET | Refills: 0 | Status: SHIPPED | OUTPATIENT
Start: 2022-12-27 | End: 2023-03-27

## 2023-02-02 DIAGNOSIS — E11.59 HYPERTENSION ASSOCIATED WITH DIABETES (HCC): ICD-10-CM

## 2023-02-02 DIAGNOSIS — E11.69 TYPE 2 DIABETES MELLITUS WITH HYPERLIPIDEMIA (HCC): ICD-10-CM

## 2023-02-02 DIAGNOSIS — E78.5 TYPE 2 DIABETES MELLITUS WITH HYPERLIPIDEMIA (HCC): ICD-10-CM

## 2023-02-02 DIAGNOSIS — I15.2 HYPERTENSION ASSOCIATED WITH DIABETES (HCC): ICD-10-CM

## 2023-02-02 RX ORDER — LOSARTAN POTASSIUM 100 MG/1
TABLET ORAL
Qty: 90 TABLET | Refills: 3 | Status: SHIPPED | OUTPATIENT
Start: 2023-02-02

## 2023-02-02 NOTE — TELEPHONE ENCOUNTER
Recent Visits  Date Type Provider Dept   09/12/22 Office Visit Micheal James MD [facility]   03/04/22 Office Visit Micheal James MD [facility]   09/03/21 Office Visit Micheal James MD [facility]   Showing recent visits within past 540 days with a meds authorizing provider and meeting all other requirements  Future Appointments  Date Type Provider Dept   05/10/23 Appointment Micheal James MD [facility]   Showing future appointments within next 150 days with a meds authorizing provider and meeting all other requirements     12/7/2022

## 2023-03-01 ENCOUNTER — OFFICE VISIT (OUTPATIENT)
Dept: INTERNAL MEDICINE CLINIC | Age: 73
End: 2023-03-01

## 2023-03-01 VITALS — WEIGHT: 158 LBS | BODY MASS INDEX: 25.5 KG/M2 | DIASTOLIC BLOOD PRESSURE: 66 MMHG | SYSTOLIC BLOOD PRESSURE: 108 MMHG

## 2023-03-01 DIAGNOSIS — E11.69 TYPE 2 DIABETES MELLITUS WITH HYPERLIPIDEMIA (HCC): Primary | ICD-10-CM

## 2023-03-01 DIAGNOSIS — E78.5 TYPE 2 DIABETES MELLITUS WITH HYPERLIPIDEMIA (HCC): Primary | ICD-10-CM

## 2023-03-01 LAB — HBA1C MFR BLD: 7.2 %

## 2023-03-01 NOTE — PROGRESS NOTES
CLINICAL PHARMACY NOTE--Diabetes    Edie mUanzor is a 68 y.o. female with PMHX significant for CAD, HTN and Type 2 Diabetes referred by Dr Claudeen Hew for diabetes counseling and medication management. ASSESSMENT/PLAN  1. Type 2 Diabetes  A1c improving, now 7.2%  Vianney data shows 78% in target range  -Trulicity 1.5 mg weekly - lillycares PAP  -metformin 1g QAM  -start walking 2-3 days per week. 2. Hypertension  BP at goal <130/80 without albuminuria  Continue ACEi    3. Hyperlipidemia  Reviewed ASCVD risk score. Continue atorvastatin 40 mg QD-- repeat flp with next set of labs while on statin. Follow up: 3 months        SUBJECTIVE/OBJECTIVE  Treatment Adherence:  Diet: 1-2 meals per day (12p, 7p), snacks on a lot of fruit. Decrease in appetite since starting trulicity  Exercise: no regular exercise  Weight trend: decreasing past month  Barriers: cost of medication (has pt assistance), strips $178 so using jak instead    Type 2 Diabetes Mellitus  Comorbities:  ASCVD  Considerations: minimize hypoglycemia, cost of medication  Current symptoms/problems include none. Episodes of hypoglycemia? None since dc insulin  Eye exam current (within one year): unknown  Tobacco history: She  reports that she has been smoking cigarettes. She has a 15.00 pack-year smoking history. She has never used smokeless tobacco. not interested in quitting now. Hasn't thought about it. Triggers include meals and coffee, she smokes 3/4 PPD, not smoking the whole cigarette bc they don't taste as good. Current diabetes medications:  metformin 1g QD, Trulicity 1.5 mg weekly   Compliance:  compliant all of the time   Side effects:  Feels \"funny\" on 2nd day after trulicity injections, now resolved but worried about increasing trulicity dose. Cost: free/affordable   Previous treatment regimens / response: states lantus dropped her BG down to 30.  Prescribed ozempic, but $500    Home blood sugar records:   Vianney 8/3/22 10/12/22 12/7/22 3/1/23    avg 159  168  169  151  V high 3%  5  4  1  High 22%  30  32  21  Target 75%  64  64  78  Low 0%  1    Hypertension:     On Ace/ARB- losartan  Medication side effects: none. Use of agents associated with hypertension: none. Home blood pressure monitoring: none    Hyperlipidemia:  atorva 40mg-- consider incr to 80 mg and/or add zetia if ldl remains >70  On aspirin 81 mg    Physical Exam  /66   Wt 158 lb (71.7 kg)   BMI 25.50 kg/m²    Body mass index is 25.5 kg/m². Wt Readings from Last 3 Encounters:   03/01/23 158 lb (71.7 kg)   12/07/22 164 lb (74.4 kg)   11/29/22 174 lb (78.9 kg)      BP Readings from Last 3 Encounters:   03/01/23 108/66   12/07/22 126/60   10/12/22 124/72        Pertinent Labs:  Lab Results   Component Value Date    LABA1C 7.6 10/12/2022    LABA1C 7.0 06/08/2022    LABA1C 7.8 03/04/2022      Lab Results   Component Value Date    MALBCR see below 10/12/2022    LABMICR <1.20 10/12/2022     No results found for: CRCLEARANCE   Lab Results   Component Value Date    LDLCALC 84 09/15/2022    CHOL 165 09/15/2022    TRIG 89 09/15/2022    HDL 63 (H) 09/15/2022     Lab Results   Component Value Date    CREATININE 1.0 10/12/2022    BUN 27 (H) 10/12/2022     10/12/2022    K 4.5 10/12/2022     10/12/2022    CO2 25 10/12/2022     Lab Results   Component Value Date    AST 15 10/12/2022    ALT 16 10/12/2022    BILITOT 0.4 10/12/2022    ALKPHOS 79 10/12/2022     No components found for: VITB12  Lab Results   Component Value Date    TSH 0.76 09/03/2021        Medication list reviewed and updated.      The 10-year ASCVD risk score (Enoch FLORIAN, et al., 2019) is: 35%    Values used to calculate the score:      Age: 68 years      Sex: Female      Is Non- : Yes      Diabetic: Yes      Tobacco smoker: Yes      Systolic Blood Pressure: 693 mmHg      Is BP treated: Yes      HDL Cholesterol: 63 mg/dL      Total Cholesterol: 165 mg/dL    Discussed with patient the Pharmacist Collaborative Practice Agreement. Patient provided verbal and/or electronic (ex. mychart) consent to participate in the collaborative practice agreement between the pharmacist and referred patient. This is in lieu of paper consent due to COVID-19 precautions and the use of remote/virtual visits.      Time spent with patient:  27 min    Iron Tierney, PharmD, [facility] Medication Management  PH: [facility]    Program: Medical Group  CPA in place:  Yes  Recommendation Provided To: Patient/Caregiver: 1 via In person  Intervention Detail: Lab(s) Ordered  Intervention Accepted By: Patient/Caregiver: 1  Gap Closed?: Yes   Time Spent (min): 20

## 2023-03-14 ENCOUNTER — TELEPHONE (OUTPATIENT)
Dept: INTERNAL MEDICINE CLINIC | Age: 73
End: 2023-03-14

## 2023-03-15 ENCOUNTER — HOSPITAL ENCOUNTER (EMERGENCY)
Age: 73
Discharge: HOME | End: 2023-03-15
Attending: EMERGENCY MEDICINE
Payer: MEDICARE

## 2023-03-15 ENCOUNTER — APPOINTMENT (OUTPATIENT)
Dept: GENERAL RADIOLOGY | Age: 73
End: 2023-03-15
Payer: MEDICARE

## 2023-03-15 VITALS
BODY MASS INDEX: 26.47 KG/M2 | DIASTOLIC BLOOD PRESSURE: 88 MMHG | SYSTOLIC BLOOD PRESSURE: 143 MMHG | TEMPERATURE: 97.8 F | RESPIRATION RATE: 14 BRPM | OXYGEN SATURATION: 100 % | WEIGHT: 164 LBS | HEART RATE: 84 BPM

## 2023-03-15 DIAGNOSIS — J20.9 ACUTE BRONCHITIS, COMPLICATED: ICD-10-CM

## 2023-03-15 DIAGNOSIS — J44.1 COPD WITH ACUTE EXACERBATION (HCC): Primary | ICD-10-CM

## 2023-03-15 LAB
ANION GAP SERPL CALCULATED.3IONS-SCNC: 11 MMOL/L (ref 3–16)
BASOPHILS # BLD: 0.1 K/UL (ref 0–0.2)
BASOPHILS NFR BLD: 1.1 %
BUN SERPL-MCNC: 32 MG/DL (ref 7–20)
CALCIUM SERPL-MCNC: 10.2 MG/DL (ref 8.3–10.6)
CHLORIDE SERPL-SCNC: 101 MMOL/L (ref 99–110)
CO2 SERPL-SCNC: 23 MMOL/L (ref 21–32)
CREAT SERPL-MCNC: 0.9 MG/DL (ref 0.6–1.2)
DEPRECATED RDW RBC AUTO: 15.9 % (ref 12.4–15.4)
EKG ATRIAL RATE: 81 BPM
EKG DIAGNOSIS: NORMAL
EKG P AXIS: 60 DEGREES
EKG P-R INTERVAL: 148 MS
EKG Q-T INTERVAL: 382 MS
EKG QRS DURATION: 68 MS
EKG QTC CALCULATION (BAZETT): 443 MS
EKG R AXIS: 20 DEGREES
EKG T AXIS: 47 DEGREES
EKG VENTRICULAR RATE: 81 BPM
EOSINOPHIL # BLD: 0.1 K/UL (ref 0–0.6)
EOSINOPHIL NFR BLD: 1.8 %
GFR SERPLBLD CREATININE-BSD FMLA CKD-EPI: >60 ML/MIN/{1.73_M2}
GLUCOSE SERPL-MCNC: 198 MG/DL (ref 70–99)
HCT VFR BLD AUTO: 34.5 % (ref 36–48)
HGB BLD-MCNC: 11.1 G/DL (ref 12–16)
LYMPHOCYTES # BLD: 1.9 K/UL (ref 1–5.1)
LYMPHOCYTES NFR BLD: 26.3 %
MCH RBC QN AUTO: 25 PG (ref 26–34)
MCHC RBC AUTO-ENTMCNC: 32.2 G/DL (ref 31–36)
MCV RBC AUTO: 77.7 FL (ref 80–100)
MONOCYTES # BLD: 0.4 K/UL (ref 0–1.3)
MONOCYTES NFR BLD: 6.1 %
NEUTROPHILS # BLD: 4.8 K/UL (ref 1.7–7.7)
NEUTROPHILS NFR BLD: 64.7 %
NT-PROBNP SERPL-MCNC: 7 PG/ML (ref 0–124)
PLATELET # BLD AUTO: 281 K/UL (ref 135–450)
PMV BLD AUTO: 9.8 FL (ref 5–10.5)
POTASSIUM SERPL-SCNC: 4.7 MMOL/L (ref 3.5–5.1)
RBC # BLD AUTO: 4.44 M/UL (ref 4–5.2)
SODIUM SERPL-SCNC: 135 MMOL/L (ref 136–145)
TROPONIN T SERPL-MCNC: <0.01 NG/ML
WBC # BLD AUTO: 7.4 K/UL (ref 4–11)

## 2023-03-15 PROCEDURE — 6370000000 HC RX 637 (ALT 250 FOR IP): Performed by: EMERGENCY MEDICINE

## 2023-03-15 PROCEDURE — 83880 ASSAY OF NATRIURETIC PEPTIDE: CPT

## 2023-03-15 PROCEDURE — 6360000002 HC RX W HCPCS: Performed by: EMERGENCY MEDICINE

## 2023-03-15 PROCEDURE — 93005 ELECTROCARDIOGRAM TRACING: CPT | Performed by: EMERGENCY MEDICINE

## 2023-03-15 PROCEDURE — 99285 EMERGENCY DEPT VISIT HI MDM: CPT

## 2023-03-15 PROCEDURE — 85025 COMPLETE CBC W/AUTO DIFF WBC: CPT

## 2023-03-15 PROCEDURE — 71046 X-RAY EXAM CHEST 2 VIEWS: CPT

## 2023-03-15 PROCEDURE — 80048 BASIC METABOLIC PNL TOTAL CA: CPT

## 2023-03-15 PROCEDURE — 84484 ASSAY OF TROPONIN QUANT: CPT

## 2023-03-15 PROCEDURE — 93010 ELECTROCARDIOGRAM REPORT: CPT | Performed by: INTERNAL MEDICINE

## 2023-03-15 RX ORDER — DOXYCYCLINE HYCLATE 100 MG
100 TABLET ORAL 2 TIMES DAILY
Qty: 10 TABLET | Refills: 0 | Status: SHIPPED | OUTPATIENT
Start: 2023-03-15 | End: 2023-03-20

## 2023-03-15 RX ORDER — PREDNISONE 20 MG/1
60 TABLET ORAL ONCE
Status: COMPLETED | OUTPATIENT
Start: 2023-03-15 | End: 2023-03-15

## 2023-03-15 RX ORDER — IPRATROPIUM BROMIDE AND ALBUTEROL SULFATE 2.5; .5 MG/3ML; MG/3ML
1 SOLUTION RESPIRATORY (INHALATION) ONCE
Status: COMPLETED | OUTPATIENT
Start: 2023-03-15 | End: 2023-03-15

## 2023-03-15 RX ORDER — CETIRIZINE HYDROCHLORIDE 10 MG/1
10 TABLET ORAL DAILY
Qty: 30 TABLET | Refills: 0 | Status: SHIPPED | OUTPATIENT
Start: 2023-03-15 | End: 2023-04-14

## 2023-03-15 RX ORDER — ALBUTEROL SULFATE 90 UG/1
2 AEROSOL, METERED RESPIRATORY (INHALATION) EVERY 6 HOURS PRN
Qty: 54 G | Refills: 1 | Status: SHIPPED | OUTPATIENT
Start: 2023-03-15

## 2023-03-15 RX ORDER — PREDNISONE 20 MG/1
40 TABLET ORAL DAILY
Qty: 8 TABLET | Refills: 0 | Status: SHIPPED | OUTPATIENT
Start: 2023-03-15 | End: 2023-03-19

## 2023-03-15 RX ORDER — CETIRIZINE HYDROCHLORIDE 10 MG/1
10 TABLET ORAL ONCE
Status: COMPLETED | OUTPATIENT
Start: 2023-03-15 | End: 2023-03-15

## 2023-03-15 RX ADMIN — CETIRIZINE HYDROCHLORIDE 10 MG: 10 TABLET, FILM COATED ORAL at 10:15

## 2023-03-15 RX ADMIN — IPRATROPIUM BROMIDE AND ALBUTEROL SULFATE 1 AMPULE: .5; 3 SOLUTION RESPIRATORY (INHALATION) at 10:15

## 2023-03-15 RX ADMIN — PREDNISONE 60 MG: 20 TABLET ORAL at 10:15

## 2023-03-15 RX ADMIN — ALBUTEROL SULFATE 2.5 MG: 2.5 SOLUTION RESPIRATORY (INHALATION) at 10:15

## 2023-03-15 ASSESSMENT — PAIN - FUNCTIONAL ASSESSMENT
PAIN_FUNCTIONAL_ASSESSMENT: NONE - DENIES PAIN
PAIN_FUNCTIONAL_ASSESSMENT: NONE - DENIES PAIN

## 2023-03-15 NOTE — ED NOTES
Discharge instructions reviewed without questions. No further needs voiced at this time. Ambulatory from department in stable condition.        Jamir Sidhu RN  03/15/23 [id number]

## 2023-03-16 ENCOUNTER — CARE COORDINATION (OUTPATIENT)
Dept: CARE COORDINATION | Age: 73
End: 2023-03-16

## 2023-03-16 NOTE — ED PROVIDER NOTES
EMERGENCY DEPARTMENT PROVIDER NOTE    Patient Identification  Pt Name: Gabe Orozco  MRN: [id number]  [place] 1950  Date of evaluation: 3/15/2023  Provider: Don Quevedo DO  PCP: Petra Vega MD    Chief Complaint  Cough (Started past Friday, cough/sob/fatigue.)      HPI  (History provided by patient)  This is a 68 y.o. female with pertinent past medical history of COPD, ongoing tobacco use, diabetes who was brought in by self for cough ongoing for the past 4 days. Associated with fatigue and shortness of breath. Breathing is worsened with exertion. Cough is also been occasionally productive of green sputum. No known sick contacts. Patient reports some sharp chest pain present in her lower chest with coughing only. Denies any fevers. I have reviewed the following nursing documentation:  Allergies: Patient has no known allergies.     Past medical history:   Past Medical History:   Diagnosis Date    Cataract 2015    COVID-19     History of acute sinusitis 09    last OV    Hypertension     Microcytic anemia     Rotator cuff syndrome of right shoulder 2014    Type II or unspecified type diabetes mellitus without mention of complication, not stated as uncontrolled      Past surgical history:   Past Surgical History:   Procedure Laterality Date     SECTION      HYSTERECTOMY (CERVIX STATUS UNKNOWN)      OVARIAN CYST REMOVAL         Home medications:   Discharge Medication List as of 3/15/2023 12:00 PM        CONTINUE these medications which have NOT CHANGED    Details   losartan (COZAAR) 100 MG tablet TAKE 1 TABLET DAILY, Disp-90 tablet, R-3Normal      metFORMIN (GLUCOPHAGE) 1000 MG tablet TAKE 1 TABLET TWICE A DAY WITH MEALS, Disp-180 tablet, R-3Normal      zolpidem (AMBIEN) 10 MG tablet TAKE ONE TABLET BY MOUTH ONCE NIGHTLY AS NEEDED FOR SLEEP, Disp-90 tablet, R-0Normal      dulaglutide (TRULICITY) 1.5 GP/0.9TY SC injection Inject 0.5 mLs into the skin once a week, Disp-6 mL, R-4Normal      ibuprofen (ADVIL;MOTRIN) 600 MG tablet TAKE ONE TABLET BY MOUTH THREE TIMES A DAY AS NEEDED FOR PAIN, Disp-90 tablet, R-1Normal      ferrous sulfate (IRON 325) 325 (65 Fe) MG tablet Take 1 tablet by mouth daily (with breakfast), Disp-90 tablet, R-1NO PRINT      atorvastatin (LIPITOR) 40 MG tablet Take 1 tablet by mouth daily, Disp-90 tablet, R-3Normal      Continuous Blood Gluc Sensor (FREESTYLE ANDRES 2 SENSOR) MISC Use to check blood sugar before meals and at bedtime, Disp-6 each, R-3Normal      hydroCHLOROthiazide (MICROZIDE) 12.5 MG capsule Take 1 capsule by mouth every morning, Disp-90 capsule, R-3Normal      glucose monitoring kit (FREESTYLE) monitoring kit DAILY PRN Starting 7/13/2015, Until Discontinued, Disp-1 kit, R-0, Normal      aspirin 81 MG tablet Take 81 mg by mouth daily. Social history:  reports that she has been smoking cigarettes. She has a 15.00 pack-year smoking history. She has never used smokeless tobacco. She reports that she does not drink alcohol and does not use drugs. Family history:    Family History   Problem Relation Age of Onset    Diabetes Father     Cancer Sister     Diabetes Sister          Exam  ED Triage Vitals [03/15/23 0914]   BP Temp Temp Source Heart Rate Resp SpO2 Height Weight   (!) 143/88 97.8 °F (36.6 °C) Oral 84 14 100 % -- 164 lb (74.4 kg)     Nursing note and vitals reviewed. Constitutional: Well developed, well nourished. Non-toxic in appearance. HENT:      Head: Normocephalic and atraumatic. Ears: External ears normal.      Nose: Nose normal.     Mouth: Membrane mucosa moist and pink. Eyes: Anicteric sclera. No discharge. Neck: Supple. Trachea midline. Cardiovascular: RRR; no murmurs, rubs, or gallops. No lower extremity edema. Pulmonary/Chest: Effort normal. No respiratory distress. Scattered expiratory wheezes. No stridor. No rales. Abdominal: Soft. No distension.    Musculoskeletal: Moves all extremities. No gross deformity. Neurological: Alert and orientedx4. Face symmetric. Speech is clear. Skin: Warm and dry. No rash. Psychiatric: Normal mood and affect. Behavior is normal.    Procedures      EKG    EKG was reviewed by emergency department physician in the absence of a cardiologist    Narrow complex sinus rhythm, rate 81, normal axis, normal MD and QRS intervals, normal Qtc, no ST elevations or depressions, normal t-wave morphology, impression NSR, no STEMI      Radiology  XR CHEST (2 VW)   Final Result   No acute cardiopulmonary process. Any applicable radiology studies including x-ray, CT, MRI, and/or ultrasound, were reviewed independently by me in addition to the radiologist.  I reviewed all radiology images and reports as well from this evaluation.     Labs  Results for orders placed or performed during the hospital encounter of 03/15/23   CBC with Auto Differential   Result Value Ref Range    WBC 7.4 4.0 - 11.0 K/uL    RBC 4.44 4.00 - 5.20 M/uL    Hemoglobin 11.1 (L) 12.0 - 16.0 g/dL    Hematocrit 34.5 (L) 36.0 - 48.0 %    MCV 77.7 (L) 80.0 - 100.0 fL    MCH 25.0 (L) 26.0 - 34.0 pg    MCHC 32.2 31.0 - 36.0 g/dL    RDW 15.9 (H) 12.4 - 15.4 %    Platelets 134 123 - 644 K/uL    MPV 9.8 5.0 - 10.5 fL    Neutrophils % 64.7 %    Lymphocytes % 26.3 %    Monocytes % 6.1 %    Eosinophils % 1.8 %    Basophils % 1.1 %    Neutrophils Absolute 4.8 1.7 - 7.7 K/uL    Lymphocytes Absolute 1.9 1.0 - 5.1 K/uL    Monocytes Absolute 0.4 0.0 - 1.3 K/uL    Eosinophils Absolute 0.1 0.0 - 0.6 K/uL    Basophils Absolute 0.1 0.0 - 0.2 K/uL   BMP w/ Reflex to MG   Result Value Ref Range    Sodium 135 (L) 136 - 145 mmol/L    Potassium reflex Magnesium 4.7 3.5 - 5.1 mmol/L    Chloride 101 99 - 110 mmol/L    CO2 23 21 - 32 mmol/L    Anion Gap 11 3 - 16    Glucose 198 (H) 70 - 99 mg/dL    BUN 32 (H) 7 - 20 mg/dL    Creatinine 0.9 0.6 - 1.2 mg/dL    Est, Glom Filt Rate >60 >60    Calcium 10.2 8.3 - 10.6 mg/dL Troponin   Result Value Ref Range    Troponin <0.01 <0.01 ng/mL   Brain Natriuretic Peptide   Result Value Ref Range    Pro-BNP 7 0 - 124 pg/mL   EKG 12 Lead   Result Value Ref Range    Ventricular Rate 81 BPM    Atrial Rate 81 BPM    P-R Interval 148 ms    QRS Duration 68 ms    Q-T Interval 382 ms    QTc Calculation (Bazett) 443 ms    P Axis 60 degrees    R Axis 20 degrees    T Axis 47 degrees    Diagnosis       Normal sinus rhythmSeptal infarct , age undeterminedConfirmed by Aakash Obregon ([id number]) on 3/15/2023 10:55:12 AM       Screenings   [place] Coma Scale  Eye Opening: Spontaneous  Best Verbal Response: Oriented  Best Motor Response: Obeys commands  [place] Coma Scale Score: 15       Is this patient to be included in the SEP-1 Core Measure due to severe sepsis or septic shock? No   Exclusion criteria - the patient is NOT to be included for SEP-1 Core Measure due to:  2+ SIRS criteria are not met      MDM and ED Course    Patient afebrile and nontoxic. No distress. Overall she is well-appearing without systemic evidence of illness. No hypoxia or increased work of breathing while on room air. EKG without evidence of acute ischemia, troponin normal, very low suspicion for ACS. Given symptoms ongoing for the past 4 days, no anticipated benefit from repeat 3-hour troponin. CXR without evidence of pneumonia, pneumothorax, mediastinal abnormality or other acute finding. Patient with diffuse wheezes on exam, presentation is most consistent with respiratory infection and COPD exacerbation. Suspect likely viral, however given patient's ongoing tobacco use and purulent sputum, will treat with doxycycline. She is not septic. Given the above findings, will pulmonary embolism is considered it is not the most likely diagnosis and my clinical suspicion is very low. Patient felt improved with breathing treatments and steroids. On my reevaluation her wheezes were improved as well.   Patient was ambulated in the emergency department without development of distress or hypoxia. Felt safe for discharge to self-care with very close PCP follow-up, patient is agreeable with plan and wishes to return to home, states she is feeling improved and not interested in hospital admission for monitoring. We will continue breathing treatments and steroids at home. Discussed importance of glucose monitoring while taking prednisone. Strict immediate return precautions were discussed at length. During the patient's ED course, the patient was given:  Medications   ipratropium-albuterol (DUONEB) nebulizer solution 1 ampule (1 ampule Inhalation Given 3/15/23 1015)   albuterol (PROVENTIL) nebulizer solution 2.5 mg (2.5 mg Nebulization Given 3/15/23 1015)   cetirizine (ZYRTEC) tablet 10 mg (10 mg Oral Given 3/15/23 1015)   predniSONE (DELTASONE) tablet 60 mg (60 mg Oral Given 3/15/23 1015)        Consults:  None        History obtained from: Patient    Pertinent social determinants of health: None applicable    Chronic conditions potentially affecting care:  COPD, diabetes    Review of other records: Outpatient notes by PCP office from 3/1/23    Reassessment:  See MDM for details of medications given and reassessment      I Dr. Hussein Porter am the primary clinician of record. Final Impression  1. COPD with acute exacerbation ([address]    2. Acute bronchitis, complicated        Blood pressure (!) 143/88, pulse 84, temperature 97.8 °F (36.6 °C), temperature source Oral, resp. rate 14, weight 164 lb (74.4 kg), SpO2 100 %, not currently breastfeeding.      Disposition:  DISPOSITION Decision To Discharge 03/15/2023 11:57:36 AM      Patient Referrals:  Francheska Russell MD  [address] [place] [address]  [phone]    In 1 day        Discharge Medications:  Discharge Medication List as of 3/15/2023 12:00 PM        START taking these medications    Details   albuterol sulfate HFA (VENTOLIN HFA) 108 (90 Base) MCG/ACT inhaler Inhale 2 puffs into the lungs every 6 hours as needed for Wheezing, Disp-54 g, R-1Print      cetirizine (ZYRTEC) 10 MG tablet Take 1 tablet by mouth daily, Disp-30 tablet, R-0Print      predniSONE (DELTASONE) 20 MG tablet Take 2 tablets by mouth daily for 4 days Take your first dose of this prescription on 3/16/2023, Disp-8 tablet, R-0Print      doxycycline hyclate (VIBRA-TABS) 100 MG tablet Take 1 tablet by mouth 2 times daily for 5 days, Disp-10 tablet, R-0Print             Discontinued Medications:  Discharge Medication List as of 3/15/2023 12:00 PM          This chart was generated using the [address] [place] [address] [address] [employer] system. I created this record but it may contain dictation errors given the limitations of this technology.     Cece Gutierrez DO (electronically signed)  Attending Emergency Physician       Cece Gutierrez DO  03/15/23 2034

## 2023-03-17 ENCOUNTER — OFFICE VISIT (OUTPATIENT)
Dept: INTERNAL MEDICINE CLINIC | Age: 73
End: 2023-03-17

## 2023-03-17 VITALS
BODY MASS INDEX: 25.18 KG/M2 | HEART RATE: 90 BPM | SYSTOLIC BLOOD PRESSURE: 132 MMHG | WEIGHT: 156 LBS | OXYGEN SATURATION: 99 % | TEMPERATURE: 97.8 F | DIASTOLIC BLOOD PRESSURE: 82 MMHG

## 2023-03-17 DIAGNOSIS — I70.0 THORACIC AORTA ATHEROSCLEROSIS (HCC): ICD-10-CM

## 2023-03-17 DIAGNOSIS — Z87.891 HISTORY OF SMOKING 30 OR MORE PACK YEARS: ICD-10-CM

## 2023-03-17 DIAGNOSIS — E11.59 HYPERTENSION ASSOCIATED WITH DIABETES (HCC): ICD-10-CM

## 2023-03-17 DIAGNOSIS — I15.2 HYPERTENSION ASSOCIATED WITH DIABETES (HCC): ICD-10-CM

## 2023-03-17 DIAGNOSIS — E78.5 TYPE 2 DIABETES MELLITUS WITH HYPERLIPIDEMIA (HCC): ICD-10-CM

## 2023-03-17 DIAGNOSIS — J40 BRONCHITIS: Primary | ICD-10-CM

## 2023-03-17 DIAGNOSIS — E11.69 TYPE 2 DIABETES MELLITUS WITH HYPERLIPIDEMIA (HCC): ICD-10-CM

## 2023-03-17 RX ORDER — EZETIMIBE 10 MG/1
10 TABLET ORAL DAILY
Qty: 90 TABLET | Refills: 1 | Status: SHIPPED | OUTPATIENT
Start: 2023-03-17

## 2023-03-17 SDOH — ECONOMIC STABILITY: FOOD INSECURITY: WITHIN THE PAST 12 MONTHS, THE FOOD YOU BOUGHT JUST DIDN'T LAST AND YOU DIDN'T HAVE MONEY TO GET MORE.: NEVER TRUE

## 2023-03-17 SDOH — ECONOMIC STABILITY: FOOD INSECURITY: WITHIN THE PAST 12 MONTHS, YOU WORRIED THAT YOUR FOOD WOULD RUN OUT BEFORE YOU GOT MONEY TO BUY MORE.: NEVER TRUE

## 2023-03-17 SDOH — ECONOMIC STABILITY: INCOME INSECURITY: HOW HARD IS IT FOR YOU TO PAY FOR THE VERY BASICS LIKE FOOD, HOUSING, MEDICAL CARE, AND HEATING?: NOT HARD AT ALL

## 2023-03-17 SDOH — ECONOMIC STABILITY: HOUSING INSECURITY
IN THE LAST 12 MONTHS, WAS THERE A TIME WHEN YOU DID NOT HAVE A STEADY PLACE TO SLEEP OR SLEPT IN A SHELTER (INCLUDING NOW)?: NO

## 2023-03-17 ASSESSMENT — PATIENT HEALTH QUESTIONNAIRE - PHQ9
SUM OF ALL RESPONSES TO PHQ QUESTIONS 1-9: 0
SUM OF ALL RESPONSES TO PHQ QUESTIONS 1-9: 0
2. FEELING DOWN, DEPRESSED OR HOPELESS: 0
1. LITTLE INTEREST OR PLEASURE IN DOING THINGS: 0
SUM OF ALL RESPONSES TO PHQ QUESTIONS 1-9: 0
SUM OF ALL RESPONSES TO PHQ9 QUESTIONS 1 & 2: 0
SUM OF ALL RESPONSES TO PHQ QUESTIONS 1-9: 0

## 2023-03-17 NOTE — PROGRESS NOTES
Chief Complaint   Patient presents with    Follow-up     Was at ER yesterday/sob       Assessment/Plan:  Hazel Price was seen today for follow-up. Diagnoses and all orders for this visit:    Bronchitis  Comments:  She is on antibiotics and prednisone. Smoking cessation was discussed but she still enjoys smoking. Hypertension associated with diabetes ([address]  Comments:  Blood pressure is well controlled. Her diabetes is well controlled but she is on Prednisone     Thoracic aorta atherosclerosis ([address]  Comments: Will add ezetimibe to her regimen. Type 2 diabetes mellitus with hyperlipidemia (HCC)  Comments:  Her lipid panel is favorable but may need to have LDL under 70    History of smoking 30 or more pack years  -     Spirometry With Bronchodilator; Future  -     CT Lung Screen (Initial or Annual); Future      Vitals:    03/17/23 0759   BP: 132/82   Pulse: 90   Temp: 97.8 °F (36.6 °C)   SpO2: 99%     Physical Exam  Vitals reviewed. Constitutional:       General: She is not in acute distress. Appearance: She is well-developed. She is not diaphoretic. HENT:      Head: Normocephalic and atraumatic. Cardiovascular:      Rate and Rhythm: Normal rate and regular rhythm. Pulses: Normal pulses. Heart sounds: Normal heart sounds. No murmur heard. No friction rub. No gallop. Pulmonary:      Effort: Pulmonary effort is normal. No respiratory distress. Breath sounds: Normal breath sounds. No stridor. No wheezing, rhonchi or rales. Chest:      Chest wall: No tenderness. Neurological:      Mental Status: She is alert and oriented to person, place, and time. Cranial Nerves: No cranial nerve deficit. Psychiatric:         Behavior: Behavior normal.         Thought Content:  Thought content normal.         Judgment: Judgment normal.       PHQ Scores 3/17/2023 9/12/2022 3/4/2022 9/3/2021 1/8/2021 8/28/2020 2/18/2019   PHQ2 Score 0 0 0 0 0 0 0   PHQ9 Score 0 0 0 0 0 0 0 Interpretation of Total Score Depression Severity: 1-4 = Minimal depression, 5-9 = Mild depression, 10-14 = Moderate depression, 15-19 = Moderately severe depression, 20-27 = Severe depression    The 10-year ASCVD risk score (Enoch FLORIAN, et al., 2019) is: 46%    Values used to calculate the score:      Age: 68 years      Sex: Female      Is Non- : Yes      Diabetic: Yes      Tobacco smoker: Yes      Systolic Blood Pressure: 008 mmHg      Is BP treated: Yes      HDL Cholesterol: 63 mg/dL      Total Cholesterol: 165 mg/dL    Mook Antunez MD  FACP

## 2023-04-06 DIAGNOSIS — I25.10 ATHEROSCLEROSIS OF NATIVE CORONARY ARTERY OF NATIVE HEART WITHOUT ANGINA PECTORIS: ICD-10-CM

## 2023-04-06 DIAGNOSIS — E11.59 HYPERTENSION ASSOCIATED WITH DIABETES (HCC): ICD-10-CM

## 2023-04-06 DIAGNOSIS — I15.2 HYPERTENSION ASSOCIATED WITH DIABETES (HCC): ICD-10-CM

## 2023-04-06 RX ORDER — HYDROCHLOROTHIAZIDE 12.5 MG/1
CAPSULE, GELATIN COATED ORAL
Qty: 90 CAPSULE | Refills: 3 | Status: SHIPPED | OUTPATIENT
Start: 2023-04-06

## 2023-04-06 RX ORDER — ATORVASTATIN CALCIUM 40 MG/1
TABLET, FILM COATED ORAL
Qty: 90 TABLET | Refills: 3 | Status: SHIPPED | OUTPATIENT
Start: 2023-04-06

## 2023-04-06 NOTE — TELEPHONE ENCOUNTER
Recent Visits  Date Type Provider Dept   03/17/23 Office Visit Read MD [facility]   09/12/22 Office Visit Read MD [facility]   03/04/22 Office Visit Read MD [facility]   Showing recent visits within past 540 days with a meds authorizing provider and meeting all other requirements  Future Appointments  Date Type Provider Dept   05/10/23 Appointment Read MD [facility]   Showing future appointments within next 150 days with a meds authorizing provider and meeting all other requirements     3/17/2023

## 2023-04-25 ENCOUNTER — HOSPITAL ENCOUNTER (OUTPATIENT)
Dept: PULMONOLOGY | Age: 73
Discharge: HOME | End: 2023-04-25
Payer: MEDICARE

## 2023-04-25 ENCOUNTER — HOSPITAL ENCOUNTER (OUTPATIENT)
Dept: CT IMAGING | Age: 73
Discharge: HOME | End: 2023-04-25
Payer: MEDICARE

## 2023-04-25 VITALS — RESPIRATION RATE: 16 BRPM | OXYGEN SATURATION: 99 % | HEART RATE: 87 BPM

## 2023-04-25 DIAGNOSIS — Z87.891 HISTORY OF SMOKING 30 OR MORE PACK YEARS: ICD-10-CM

## 2023-04-25 LAB
EXPIRATORY TIME-POST: NORMAL
EXPIRATORY TIME: NORMAL
FEF 25-75% %CHNG: NORMAL
FEF 25-75% %PRED-POST: NORMAL
FEF 25-75% %PRED-PRE: NORMAL
FEF 25-75% PRED: NORMAL
FEF 25-75%-POST: NORMAL
FEF 25-75%-PRE: NORMAL
FEV1 %PRED-POST: NORMAL
FEV1 %PRED-PRE: 89 %
FEV1 PRED: NORMAL
FEV1-POST: NORMAL
FEV1-PRE: NORMAL
FEV1/FVC %PRED-POST: NORMAL
FEV1/FVC %PRED-PRE: NORMAL
FEV1/FVC PRED: NORMAL
FEV1/FVC-POST: NORMAL
FEV1/FVC-PRE: 99 %
FVC %PRED-POST: NORMAL
FVC %PRED-PRE: NORMAL
FVC PRED: NORMAL
FVC-POST: NORMAL
FVC-PRE: NORMAL
PEF %PRED-POST: NORMAL
PEF %PRED-PRE: NORMAL
PEF PRED: NORMAL
PEF%CHNG: NORMAL
PEF-POST: NORMAL
PEF-PRE: NORMAL

## 2023-04-25 PROCEDURE — 94010 BREATHING CAPACITY TEST: CPT

## 2023-04-25 PROCEDURE — 94760 N-INVAS EAR/PLS OXIMETRY 1: CPT

## 2023-04-25 PROCEDURE — 71271 CT THORAX LUNG CANCER SCR C-: CPT

## 2023-04-25 RX ORDER — ALBUTEROL SULFATE 90 UG/1
4 AEROSOL, METERED RESPIRATORY (INHALATION) ONCE
Status: CANCELLED | OUTPATIENT
Start: 2023-04-25

## 2023-04-25 ASSESSMENT — PULMONARY FUNCTION TESTS
FEV1/FVC_PRE: 99
FEV1_PERCENT_PREDICTED_PRE: 89

## 2023-04-27 NOTE — PROCEDURES
Pulmonary Function Testing      Patient name:  Ramesh Zhang     [facility] Unit #:   [id number]   Date of test:  4/25/2023  Date of interpretation:   4/27/2023    Ms. Ramesh Zhang is a 68y.o. year-old female. The spirometry data were acceptable and reproducible. Spirometry:  Flow volume loops were normal. The FEV-1/FVC ratio was normal. The FEV-1 was 1.77 liters (89% of predicted), which was normal. The FVC was 2.33 liters (91% of predicted), which was normal. Response to inhaled bronchodilators (albuterol) was not performed. Lung volumes:  Lung volumes were not tested. Diffusion capacity was not tested. Interpretation:  Normal spirometry.     Comments:

## 2023-05-10 ENCOUNTER — OFFICE VISIT (OUTPATIENT)
Dept: INTERNAL MEDICINE CLINIC | Age: 73
End: 2023-05-10

## 2023-05-10 VITALS
SYSTOLIC BLOOD PRESSURE: 110 MMHG | BODY MASS INDEX: 25.23 KG/M2 | WEIGHT: 157 LBS | DIASTOLIC BLOOD PRESSURE: 72 MMHG | HEART RATE: 85 BPM | HEIGHT: 66 IN | TEMPERATURE: 97.7 F | OXYGEN SATURATION: 99 %

## 2023-05-10 DIAGNOSIS — Z00.00 MEDICARE ANNUAL WELLNESS VISIT, SUBSEQUENT: ICD-10-CM

## 2023-05-10 DIAGNOSIS — J44.9 CHRONIC OBSTRUCTIVE PULMONARY DISEASE, UNSPECIFIED COPD TYPE (HCC): Primary | Chronic | ICD-10-CM

## 2023-05-10 DIAGNOSIS — R63.4 WEIGHT LOSS: ICD-10-CM

## 2023-05-10 DIAGNOSIS — E11.69 TYPE 2 DIABETES MELLITUS WITH HYPERLIPIDEMIA (HCC): ICD-10-CM

## 2023-05-10 DIAGNOSIS — E78.5 TYPE 2 DIABETES MELLITUS WITH HYPERLIPIDEMIA (HCC): ICD-10-CM

## 2023-05-10 DIAGNOSIS — I15.2 HYPERTENSION ASSOCIATED WITH DIABETES (HCC): ICD-10-CM

## 2023-05-10 DIAGNOSIS — I70.0 THORACIC AORTA ATHEROSCLEROSIS (HCC): ICD-10-CM

## 2023-05-10 DIAGNOSIS — E11.59 HYPERTENSION ASSOCIATED WITH DIABETES (HCC): ICD-10-CM

## 2023-05-10 LAB
T4 FREE SERPL-MCNC: 1.3 NG/DL (ref 0.9–1.8)
TSH SERPL DL<=0.005 MIU/L-ACNC: 0.83 UIU/ML (ref 0.27–4.2)

## 2023-05-10 SDOH — HEALTH STABILITY: PHYSICAL HEALTH: ON AVERAGE, HOW MANY DAYS PER WEEK DO YOU ENGAGE IN MODERATE TO STRENUOUS EXERCISE (LIKE A BRISK WALK)?: 1 DAY

## 2023-05-10 SDOH — HEALTH STABILITY: PHYSICAL HEALTH: ON AVERAGE, HOW MANY MINUTES DO YOU ENGAGE IN EXERCISE AT THIS LEVEL?: 20 MIN

## 2023-05-10 ASSESSMENT — LIFESTYLE VARIABLES
HOW MANY STANDARD DRINKS CONTAINING ALCOHOL DO YOU HAVE ON A TYPICAL DAY: 1
HOW OFTEN DO YOU HAVE SIX OR MORE DRINKS ON ONE OCCASION: 1
HOW OFTEN DO YOU HAVE A DRINK CONTAINING ALCOHOL: 2

## 2023-05-10 NOTE — PROGRESS NOTES
Medicare Annual Wellness Visit    Alison Schofield is here for Medicare AWV    Assessment & Plan   Chronic obstructive pulmonary disease, unspecified COPD type ([facility]  Comments:  Her Spirometry was normal.    Hypertension associated with diabetes ([facility]  Type 2 diabetes mellitus with hyperlipidemia ([facility]  Thoracic aorta atherosclerosis ([facility]  Weight loss  -     T4, Free; Future  -     TSH; Future  Medicare annual wellness visit, subsequent       Current GOLD classification for Aracelis Malone      GOLD Stage:  A  Group:    Recorded domestic exacerbations past 12 months:  0  Current recorded COPD Assessment Tool (CAT) score of  8  Current eosinophil count:  1.8     Inhaler Device   Acceptable for Use   Respimat  Not Breath Actuated Yes   MDI  Not Breath Actuated Yes           DPI  Observed PIF   using  In-Check Meter   Optimal PIF   Acceptable for Use   HANDIHALER  >30    Pressair  >45    NEOHALER  >50    Diskus  >60    ELLIPTA  >60      Records show Alison Schofield was not using maintenance therapy prior to admission.           LONG-ACTING (LABA)   Arformoterol (Brovana) NEBULIZER   Indacaterol (Arcapta) NEOHALER   Olodaterol (Striverdi) Respimat   Salmeterol (Serevent) MDI, DISKUS   LONG-ACTING (LAMA)   Aclidinium bromide (Tudorza) PRESSAIR   Glycopyrronium bromide Juan Alberto Chess) NEOHALER   Tiotropium (Spiriva) Respimat, HANDIHALER   Umeclidinium (Incruse) ELLIPTA   (LABA/LAMA)   Formoterol/glycopyrronium (Bevespi) MDI   Indacaterol/glycopyrronium (Utibron) NEOHALER   Vilanterol/umeclidinium (Anoro) ELLIPTA   Olodaterol/tiotropium (Stiolto) Respimat   (LABA/ICS)   Formoterol/budesomide (Symbicort) MDI   Formoterol/mometasone (Dulera) MDI   Salmeterol/fluticasone (Advair) MDI, DISKUS   Vilanterol/fluticasone (Breo) ELLIPTA   (LABA/LAMA/ICS)   Fluticasone/umeclidinium/vilanterol (Trelegy) ELLIPTA   Budesonide/glycopyrrolate/formoterol fumarate (Beztri) aerosphere     Electronically signed by Iker Trevizo MD on

## 2023-05-26 ENCOUNTER — TELEPHONE (OUTPATIENT)
Dept: INTERNAL MEDICINE CLINIC | Age: 73
End: 2023-05-26

## 2023-05-30 NOTE — TELEPHONE ENCOUNTER
Thea, do you recall anything about her Earnie Juliana sensors? Only patient assistance I found on file was for her Trulicity.

## 2023-05-30 NOTE — TELEPHONE ENCOUNTER
Spoke with pt. She received her Shantanu Fox from [employer] ph: [phone]    Tried to submit order through [employer]. Unfortunately, current medicare guidelines require insulin therapy or hx of problematic hypoglycemia. She is no longer on insulin and no longer having hypoglycemia, so medicare will not cover CGM. Pt notified.      Lori Sidhu, PharmD, [facility]  Medication Management Clinic   [place] Ph: [phone]  [place] Ph: [phone]  5/30/2023 2:33 PM    For Pharmacy Admin Tracking Only    Program: Medical Group  Time Spent (min): 10

## 2023-06-07 ENCOUNTER — OFFICE VISIT (OUTPATIENT)
Dept: INTERNAL MEDICINE CLINIC | Age: 73
End: 2023-06-07
Payer: MEDICARE

## 2023-06-07 VITALS — WEIGHT: 158.4 LBS | SYSTOLIC BLOOD PRESSURE: 108 MMHG | BODY MASS INDEX: 25.57 KG/M2 | DIASTOLIC BLOOD PRESSURE: 60 MMHG

## 2023-06-07 DIAGNOSIS — E11.69 TYPE 2 DIABETES MELLITUS WITH HYPERLIPIDEMIA (HCC): Primary | ICD-10-CM

## 2023-06-07 DIAGNOSIS — E78.5 TYPE 2 DIABETES MELLITUS WITH HYPERLIPIDEMIA (HCC): Primary | ICD-10-CM

## 2023-06-07 LAB — HBA1C MFR BLD: 7.3 %

## 2023-06-07 PROCEDURE — 99999 PR OFFICE/OUTPT VISIT,PROCEDURE ONLY: CPT

## 2023-06-07 PROCEDURE — APPNB60 APP NON BILLABLE TIME 46-60 MINS

## 2023-06-07 PROCEDURE — 83036 HEMOGLOBIN GLYCOSYLATED A1C: CPT

## 2023-06-07 RX ORDER — GLUCOSAMINE HCL/CHONDROITIN SU 500-400 MG
CAPSULE ORAL
Qty: 100 STRIP | Refills: 11 | Status: SHIPPED | OUTPATIENT
Start: 2023-06-07

## 2023-06-07 NOTE — PROGRESS NOTES
CLINICAL PHARMACY NOTE--Diabetes    Dang Castorena is a 68 y.o. female with PMHX significant for CAD, HTN and Type 2 Diabetes referred by Dr Jerrod Batista for diabetes counseling and medication management. ASSESSMENT/PLAN  1. Type 2 Diabetes  A1c 7.3% 06/07/23  Pt no longer qualifies for jak, as she is not taking insulin. Needs test strips.  -Trulicity 1.5 mg weekly - [employer] PAP, has 4 more boxes and would like to use these before considering a dose increase  -metformin 1g BID  -start walking 2-3 days per week. Referred to [facility]    2. Hypertension  BP at goal <130/80 without albuminuria  Continue ARB    3. Hyperlipidemia  Reviewed ASCVD risk score. Continue atorvastatin 40 mg QD-- repeat flp with next set of labs while on statin. Follow up: 3 months            SUBJECTIVE/OBJECTIVE  Treatment Adherence:  Diet: 1-2 meals per day (12p, 7p), snacks on a lot of fruit. Decrease in appetite since starting trulicity  Exercise: no regular exercise  Weight trend: decreasing past month  Barriers: cost of medication (has pt assistance), strips $178 so using jak instead    Type 2 Diabetes Mellitus  Comorbities:  ASCVD  Considerations: minimize hypoglycemia, cost of medication  Current symptoms/problems include none. Episodes of hypoglycemia? None since dc insulin  Eye exam current (within one year): unknown  Tobacco history: She  reports that she has been smoking cigarettes. She has a 36.75 pack-year smoking history. She has never used smokeless tobacco. not interested in quitting now. Hasn't thought about it. Triggers include meals and coffee, she smokes 3/4 PPD, not smoking the whole cigarette bc they don't taste as good. Current diabetes medications:  metformin 1g BID, Trulicity 1.5 mg weekly   Compliance:  compliant all of the time   Side effects:  Feels \"funny\" on 2nd day after trulicity injections, now resolved but worried about increasing trulicity dose.   Cost: free/affordable   Previous

## 2023-06-22 DIAGNOSIS — E78.5 TYPE 2 DIABETES MELLITUS WITH HYPERLIPIDEMIA (HCC): ICD-10-CM

## 2023-06-22 DIAGNOSIS — E11.59 HYPERTENSION ASSOCIATED WITH DIABETES (HCC): ICD-10-CM

## 2023-06-22 DIAGNOSIS — E11.69 TYPE 2 DIABETES MELLITUS WITH HYPERLIPIDEMIA (HCC): ICD-10-CM

## 2023-06-22 DIAGNOSIS — I15.2 HYPERTENSION ASSOCIATED WITH DIABETES (HCC): ICD-10-CM

## 2023-07-13 DIAGNOSIS — F51.01 PRIMARY INSOMNIA: ICD-10-CM

## 2023-07-13 RX ORDER — ZOLPIDEM TARTRATE 10 MG/1
TABLET ORAL
Qty: 90 TABLET | Refills: 1 | Status: SHIPPED | OUTPATIENT
Start: 2023-07-13 | End: 2023-10-11

## 2023-07-19 ENCOUNTER — HOSPITAL ENCOUNTER (EMERGENCY)
Age: 73
Discharge: HOME | End: 2023-07-19
Attending: EMERGENCY MEDICINE
Payer: COMMERCIAL

## 2023-07-19 ENCOUNTER — APPOINTMENT (OUTPATIENT)
Dept: GENERAL RADIOLOGY | Age: 73
End: 2023-07-19
Payer: COMMERCIAL

## 2023-07-19 VITALS
WEIGHT: 150 LBS | SYSTOLIC BLOOD PRESSURE: 149 MMHG | BODY MASS INDEX: 24.11 KG/M2 | RESPIRATION RATE: 18 BRPM | DIASTOLIC BLOOD PRESSURE: 82 MMHG | HEART RATE: 88 BPM | TEMPERATURE: 98.2 F | OXYGEN SATURATION: 100 % | HEIGHT: 66 IN

## 2023-07-19 DIAGNOSIS — S82.002A CLOSED NONDISPLACED FRACTURE OF LEFT PATELLA, UNSPECIFIED FRACTURE MORPHOLOGY, INITIAL ENCOUNTER: Primary | ICD-10-CM

## 2023-07-19 PROCEDURE — 73562 X-RAY EXAM OF KNEE 3: CPT

## 2023-07-19 PROCEDURE — 6370000000 HC RX 637 (ALT 250 FOR IP): Performed by: EMERGENCY MEDICINE

## 2023-07-19 PROCEDURE — 99283 EMERGENCY DEPT VISIT LOW MDM: CPT

## 2023-07-19 RX ORDER — HYDROCODONE BITARTRATE AND ACETAMINOPHEN 5; 325 MG/1; MG/1
1 TABLET ORAL EVERY 6 HOURS PRN
Qty: 16 TABLET | Refills: 0 | Status: SHIPPED | OUTPATIENT
Start: 2023-07-19 | End: 2023-07-23

## 2023-07-19 RX ORDER — ACETAMINOPHEN 500 MG
1000 TABLET ORAL ONCE
Status: COMPLETED | OUTPATIENT
Start: 2023-07-19 | End: 2023-07-19

## 2023-07-19 RX ADMIN — ACETAMINOPHEN 1000 MG: 500 TABLET ORAL at 16:10

## 2023-07-19 ASSESSMENT — LIFESTYLE VARIABLES
HOW MANY STANDARD DRINKS CONTAINING ALCOHOL DO YOU HAVE ON A TYPICAL DAY: PATIENT DOES NOT DRINK
HOW OFTEN DO YOU HAVE A DRINK CONTAINING ALCOHOL: NEVER

## 2023-07-19 ASSESSMENT — PAIN - FUNCTIONAL ASSESSMENT: PAIN_FUNCTIONAL_ASSESSMENT: 0-10

## 2023-07-19 ASSESSMENT — PAIN SCALES - GENERAL: PAINLEVEL_OUTOF10: 8

## 2023-07-20 ENCOUNTER — OFFICE VISIT (OUTPATIENT)
Dept: ORTHOPEDIC SURGERY | Age: 73
End: 2023-07-20

## 2023-07-20 VITALS — BODY MASS INDEX: 24.11 KG/M2 | WEIGHT: 150 LBS | HEIGHT: 66 IN

## 2023-07-20 DIAGNOSIS — F17.200 CURRENT SMOKER: ICD-10-CM

## 2023-07-20 DIAGNOSIS — S82.002A CLOSED DISPLACED FRACTURE OF LEFT PATELLA, UNSPECIFIED FRACTURE MORPHOLOGY, INITIAL ENCOUNTER: Primary | ICD-10-CM

## 2023-07-20 SDOH — HEALTH STABILITY: PHYSICAL HEALTH: ON AVERAGE, HOW MANY DAYS PER WEEK DO YOU ENGAGE IN MODERATE TO STRENUOUS EXERCISE (LIKE A BRISK WALK)?: 0 DAYS

## 2023-08-09 ENCOUNTER — TELEPHONE (OUTPATIENT)
Dept: ORTHOPEDIC SURGERY | Age: 73
End: 2023-08-09

## 2023-08-09 NOTE — TELEPHONE ENCOUNTER
Notified this is an OBTW (oh by the way this is workers comp), patient just filed or notified us they have a workers comp claim.   Jose Juan Villagomez is necessary for the date(s) of service: 7.20.2023

## 2023-08-31 ENCOUNTER — OFFICE VISIT (OUTPATIENT)
Dept: ORTHOPEDIC SURGERY | Age: 73
End: 2023-08-31

## 2023-08-31 VITALS — BODY MASS INDEX: 24.11 KG/M2 | HEIGHT: 66 IN | WEIGHT: 150 LBS

## 2023-08-31 DIAGNOSIS — S82.002A CLOSED DISPLACED FRACTURE OF LEFT PATELLA, UNSPECIFIED FRACTURE MORPHOLOGY, INITIAL ENCOUNTER: Primary | ICD-10-CM

## 2023-08-31 PROCEDURE — APPNB15 APP NON BILLABLE TIME 0-15 MINS: Performed by: NURSE PRACTITIONER

## 2023-08-31 PROCEDURE — 99024 POSTOP FOLLOW-UP VISIT: CPT | Performed by: NURSE PRACTITIONER

## 2023-09-06 DIAGNOSIS — E11.69 TYPE 2 DIABETES MELLITUS WITH HYPERLIPIDEMIA (HCC): ICD-10-CM

## 2023-09-06 DIAGNOSIS — I70.0 THORACIC AORTA ATHEROSCLEROSIS (HCC): ICD-10-CM

## 2023-09-06 DIAGNOSIS — E78.5 TYPE 2 DIABETES MELLITUS WITH HYPERLIPIDEMIA (HCC): ICD-10-CM

## 2023-09-07 RX ORDER — EZETIMIBE 10 MG/1
10 TABLET ORAL DAILY
Qty: 90 TABLET | Refills: 1 | Status: SHIPPED | OUTPATIENT
Start: 2023-09-07

## 2023-09-13 ENCOUNTER — OFFICE VISIT (OUTPATIENT)
Dept: INTERNAL MEDICINE CLINIC | Age: 73
End: 2023-09-13
Payer: MEDICARE

## 2023-09-13 VITALS — SYSTOLIC BLOOD PRESSURE: 112 MMHG | BODY MASS INDEX: 25.99 KG/M2 | WEIGHT: 161 LBS | DIASTOLIC BLOOD PRESSURE: 64 MMHG

## 2023-09-13 DIAGNOSIS — E78.5 TYPE 2 DIABETES MELLITUS WITH HYPERLIPIDEMIA (HCC): Primary | ICD-10-CM

## 2023-09-13 DIAGNOSIS — E11.69 TYPE 2 DIABETES MELLITUS WITH HYPERLIPIDEMIA (HCC): Primary | ICD-10-CM

## 2023-09-13 LAB — HBA1C MFR BLD: 8 %

## 2023-09-13 PROCEDURE — 83036 HEMOGLOBIN GLYCOSYLATED A1C: CPT

## 2023-09-13 PROCEDURE — APPNB60 APP NON BILLABLE TIME 46-60 MINS

## 2023-09-13 NOTE — PROGRESS NOTES
CLINICAL PHARMACY NOTE--Diabetes    Jackie Abdul is a 68 y.o. female with PMHX significant for CAD, HTN and Type 2 Diabetes referred by Dr Brown Gallo for diabetes counseling and medication management. Interval update: Pt sustained patella fx and admits to poor diet and significant decrease in activity over the past 2 months. She is motivated to get back on track. ASSESSMENT/PLAN  1. Type 2 Diabetes  A1c increased (7.3-->8% on 09/13/23) likely due to recent lifestyle changes as a result of injury  Pt no longer qualifies for [place], as she is not taking insulin. Currently using test strips.  -Trulicity 1.5 mg weekly - would like to try implementing TLC before considering a dose increase  -need to reapply for lillycares PAP next visit  -metformin 1g BID  -start walking 2-3 days per week. Referred to [facility]    2. Hypertension  BP at goal <130/80 without albuminuria  Continue ARB and hydrochlorothiazide    3. Hyperlipidemia  Reviewed ASCVD risk score. Ldl 84  Continue atorvastatin 40 mg QD-- repeat flp with next set of labs while on statin. Continue zetia 10 mg qd    Follow up: 3 months            SUBJECTIVE/OBJECTIVE  Treatment Adherence:  Diet: 1-2 meals per day (12p, 7p), snacks on a lot of fruit. Decrease in appetite since starting trulicity  Exercise: no regular exercise  Weight trend: decreasing past month  Barriers: cost of medication (has pt assistance), strips $178 so using jak instead    Type 2 Diabetes Mellitus  Comorbities:  ASCVD  Considerations: minimize hypoglycemia, cost of medication  Current symptoms/problems include none. Episodes of hypoglycemia? None since dc insulin  Eye exam current (within one year): unknown  Tobacco history: She  reports that she has been smoking cigarettes. She has a 36.75 pack-year smoking history. She has never used smokeless tobacco. not interested in quitting now. Hasn't thought about it.  Triggers include meals and coffee, she smokes 3/4 PPD, not smoking

## 2023-10-14 DIAGNOSIS — G89.29 CHRONIC RIGHT-SIDED LOW BACK PAIN WITHOUT SCIATICA: ICD-10-CM

## 2023-10-14 DIAGNOSIS — M54.50 CHRONIC RIGHT-SIDED LOW BACK PAIN WITHOUT SCIATICA: ICD-10-CM

## 2023-10-16 RX ORDER — IBUPROFEN 600 MG/1
600 TABLET ORAL EVERY 8 HOURS PRN
Qty: 90 TABLET | Refills: 1 | Status: SHIPPED | OUTPATIENT
Start: 2023-10-16

## 2023-10-18 ENCOUNTER — TELEPHONE (OUTPATIENT)
Dept: INTERNAL MEDICINE CLINIC | Age: 73
End: 2023-10-18

## 2023-10-18 NOTE — TELEPHONE ENCOUNTER
----- Message from Shoshana Jesus sent at 10/18/2023  9:32 AM EDT -----  Regarding: Refill  Contact: [phone]  Just got a call from [employer]. They said they need a new prescription so they can ship the medicine out on the 27th of Oct. Thanks.

## 2023-10-19 NOTE — TELEPHONE ENCOUNTER
Spoke with pt. [employer] is calling stating they need an RX for [place], but pt no longer qualifies for [place] per medicare guidelines bc she is no longer on insulin. She will notify them next time they call that she is no longer on insulin. Pt verbalized understanding.      Andreina Salmeron, PharmD, [facility]  Medication Management Clinic   [address] [place] [address] Ph: [phone]  [facility] Ph: [phone]  10/19/2023 3:13 PM

## 2023-10-25 ENCOUNTER — TELEPHONE (OUTPATIENT)
Dept: INTERNAL MEDICINE CLINIC | Age: 73
End: 2023-10-25

## 2023-10-25 NOTE — TELEPHONE ENCOUNTER
Pt needs to re-enroll in [facility] for Trulicity 1.5 mg.  Please provide her with application at upcoming visit with Dr Ruben Ramey on 11/10/23.     Enedina Bruno, PharmD, [facility]  Medication Management Clinic   [address] [place] [address] Ph: [phone]  [facility] Ph: [phone]  10/25/2023 5:10 PM

## 2023-11-02 ENCOUNTER — OFFICE VISIT (OUTPATIENT)
Dept: ORTHOPEDIC SURGERY | Age: 73
End: 2023-11-02

## 2023-11-02 VITALS — BODY MASS INDEX: 25.88 KG/M2 | HEIGHT: 66 IN | WEIGHT: 161 LBS

## 2023-11-02 DIAGNOSIS — S82.002A CLOSED DISPLACED FRACTURE OF LEFT PATELLA, UNSPECIFIED FRACTURE MORPHOLOGY, INITIAL ENCOUNTER: Primary | ICD-10-CM

## 2023-11-02 DIAGNOSIS — F17.200 CURRENT SMOKER: ICD-10-CM

## 2023-11-07 NOTE — PROGRESS NOTES
overall alignment of this fracture is still good and healed well. Recommend ROM and Quad exercises. NSAIDs OTC. She can go back to normal activity with no restrictions. She will be seen PRN. I told the patient that it is not unusual to have some achy pain and swelling for up to a year after a fracture. The patient smokes cigarettes, and we discussed with the patient the risks of smoking on general health and also on bone and soft tissue healing (delay and non-union), and promised to cut down or stop smoking. Smoking: Educated the patient regarding the hazards of smoking and that it harms their body in many ways. It increases the chance of developing heart disease, lung disease, cancer, and other health problems including poor bone and wound healing. The importance of smoking cessation for optimal bone and wound healing was stressed. This was communicated verbally, 5 Minutes.       Russel Aguayo MD

## 2023-11-10 ENCOUNTER — OFFICE VISIT (OUTPATIENT)
Dept: INTERNAL MEDICINE CLINIC | Age: 73
End: 2023-11-10

## 2023-11-10 VITALS
OXYGEN SATURATION: 98 % | SYSTOLIC BLOOD PRESSURE: 122 MMHG | HEART RATE: 95 BPM | WEIGHT: 160 LBS | TEMPERATURE: 97.7 F | BODY MASS INDEX: 25.82 KG/M2 | DIASTOLIC BLOOD PRESSURE: 70 MMHG

## 2023-11-10 DIAGNOSIS — E78.5 TYPE 2 DIABETES MELLITUS WITH HYPERLIPIDEMIA (HCC): ICD-10-CM

## 2023-11-10 DIAGNOSIS — E11.69 TYPE 2 DIABETES MELLITUS WITH HYPERLIPIDEMIA (HCC): ICD-10-CM

## 2023-11-10 DIAGNOSIS — E11.59 HYPERTENSION ASSOCIATED WITH DIABETES (HCC): ICD-10-CM

## 2023-11-10 DIAGNOSIS — E11.59 HYPERTENSION ASSOCIATED WITH DIABETES (HCC): Primary | ICD-10-CM

## 2023-11-10 DIAGNOSIS — Z23 NEED FOR INFLUENZA VACCINATION: ICD-10-CM

## 2023-11-10 DIAGNOSIS — Z23 NEED FOR SHINGLES VACCINE: ICD-10-CM

## 2023-11-10 DIAGNOSIS — J43.2 CENTRILOBULAR EMPHYSEMA (HCC): Chronic | ICD-10-CM

## 2023-11-10 DIAGNOSIS — I15.2 HYPERTENSION ASSOCIATED WITH DIABETES (HCC): ICD-10-CM

## 2023-11-10 DIAGNOSIS — I15.2 HYPERTENSION ASSOCIATED WITH DIABETES (HCC): Primary | ICD-10-CM

## 2023-11-10 LAB
ALBUMIN SERPL-MCNC: 4.7 G/DL (ref 3.4–5)
ALBUMIN/GLOB SERPL: 2.2 {RATIO} (ref 1.1–2.2)
ALP SERPL-CCNC: 95 U/L (ref 40–129)
ALT SERPL-CCNC: 19 U/L (ref 10–40)
ANION GAP SERPL CALCULATED.3IONS-SCNC: 10 MMOL/L (ref 3–16)
AST SERPL-CCNC: 23 U/L (ref 15–37)
BILIRUB SERPL-MCNC: 0.7 MG/DL (ref 0–1)
BUN SERPL-MCNC: 21 MG/DL (ref 7–20)
CALCIUM SERPL-MCNC: 9.8 MG/DL (ref 8.3–10.6)
CHLORIDE SERPL-SCNC: 104 MMOL/L (ref 99–110)
CO2 SERPL-SCNC: 25 MMOL/L (ref 21–32)
CREAT SERPL-MCNC: 0.8 MG/DL (ref 0.6–1.2)
CREAT UR-MCNC: 125.2 MG/DL (ref 28–259)
GFR SERPLBLD CREATININE-BSD FMLA CKD-EPI: >60 ML/MIN/{1.73_M2}
GLUCOSE SERPL-MCNC: 163 MG/DL (ref 70–99)
MICROALBUMIN UR DL<=1MG/L-MCNC: <1.2 MG/DL
MICROALBUMIN/CREAT UR: NORMAL MG/G (ref 0–30)
POTASSIUM SERPL-SCNC: 5 MMOL/L (ref 3.5–5.1)
PROT SERPL-MCNC: 6.8 G/DL (ref 6.4–8.2)
SODIUM SERPL-SCNC: 139 MMOL/L (ref 136–145)

## 2023-11-10 RX ORDER — INSULIN GLARGINE 300 U/ML
15 INJECTION, SOLUTION SUBCUTANEOUS NIGHTLY
Qty: 3 ADJUSTABLE DOSE PRE-FILLED PEN SYRINGE | Refills: 1 | Status: SHIPPED | OUTPATIENT
Start: 2023-11-10

## 2023-11-10 RX ORDER — ZOLPIDEM TARTRATE 10 MG/1
10 TABLET ORAL NIGHTLY PRN
COMMUNITY
Start: 2023-10-13

## 2023-11-10 RX ORDER — ZOSTER VACCINE RECOMBINANT, ADJUVANTED 50 MCG/0.5
0.5 KIT INTRAMUSCULAR SEE ADMIN INSTRUCTIONS
Qty: 0.5 ML | Refills: 0 | Status: SHIPPED | OUTPATIENT
Start: 2023-11-10 | End: 2023-11-11

## 2023-11-10 ASSESSMENT — PATIENT HEALTH QUESTIONNAIRE - PHQ9
SUM OF ALL RESPONSES TO PHQ QUESTIONS 1-9: 0
SUM OF ALL RESPONSES TO PHQ QUESTIONS 1-9: 0
SUM OF ALL RESPONSES TO PHQ9 QUESTIONS 1 & 2: 0
1. LITTLE INTEREST OR PLEASURE IN DOING THINGS: 0
2. FEELING DOWN, DEPRESSED OR HOPELESS: 0
SUM OF ALL RESPONSES TO PHQ QUESTIONS 1-9: 0
SUM OF ALL RESPONSES TO PHQ QUESTIONS 1-9: 0

## 2023-11-29 DIAGNOSIS — I15.2 HYPERTENSION ASSOCIATED WITH DIABETES (HCC): ICD-10-CM

## 2023-11-29 DIAGNOSIS — E11.59 HYPERTENSION ASSOCIATED WITH DIABETES (HCC): ICD-10-CM

## 2023-11-29 DIAGNOSIS — E78.5 TYPE 2 DIABETES MELLITUS WITH HYPERLIPIDEMIA (HCC): ICD-10-CM

## 2023-11-29 DIAGNOSIS — E11.69 TYPE 2 DIABETES MELLITUS WITH HYPERLIPIDEMIA (HCC): ICD-10-CM

## 2023-11-29 NOTE — PROGRESS NOTES
Pt approved for lillycares through 2024. Refilled jak through [facility] now that pt back on insulin. For Pharmacy Admin Tracking Only    Program: Medical Group  CPA in place:  Yes  Recommendation Provided To:  Other: 2  Intervention Detail: Patient Access Assistance/Sample Provided and Refill(s) Provided  Intervention Accepted By: Other: 2  Time Spent (min): 5

## 2024-01-03 ENCOUNTER — OFFICE VISIT (OUTPATIENT)
Dept: INTERNAL MEDICINE CLINIC | Age: 74
End: 2024-01-03
Payer: MEDICARE

## 2024-01-03 VITALS — BODY MASS INDEX: 26.47 KG/M2 | SYSTOLIC BLOOD PRESSURE: 124 MMHG | WEIGHT: 164 LBS | DIASTOLIC BLOOD PRESSURE: 78 MMHG

## 2024-01-03 DIAGNOSIS — E11.69 TYPE 2 DIABETES MELLITUS WITH HYPERLIPIDEMIA (HCC): Primary | ICD-10-CM

## 2024-01-03 DIAGNOSIS — E78.5 TYPE 2 DIABETES MELLITUS WITH HYPERLIPIDEMIA (HCC): Primary | ICD-10-CM

## 2024-01-03 LAB — HBA1C MFR BLD: 7.1 %

## 2024-01-03 PROCEDURE — 99999 PR OFFICE/OUTPT VISIT,PROCEDURE ONLY: CPT

## 2024-01-03 PROCEDURE — APPNB60 APP NON BILLABLE TIME 46-60 MINS

## 2024-01-03 PROCEDURE — 83036 HEMOGLOBIN GLYCOSYLATED A1C: CPT

## 2024-01-03 NOTE — PATIENT INSTRUCTIONS
Have fasting labs drawn before next visit. No need for appointment, just come in and sign clipboard for lab.    Decrease Toujeo to 12 units nightly. Call if still getting low blood sugars.

## 2024-01-03 NOTE — PROGRESS NOTES
CHOL 165 09/15/2022    TRIG 89 09/15/2022    HDL 63 (H) 09/15/2022     Lab Results   Component Value Date    CREATININE 0.8 11/10/2023    BUN 21 (H) 11/10/2023     11/10/2023    K 5.0 11/10/2023     11/10/2023    CO2 25 11/10/2023     Lab Results   Component Value Date    AST 23 11/10/2023    ALT 19 11/10/2023    BILITOT 0.7 11/10/2023    ALKPHOS 95 11/10/2023     No components found for: \"VITB12\"  Lab Results   Component Value Date    TSH 0.83 05/10/2023    TSHREFLEX 0.61 10/01/2013        Medication list reviewed and updated.     The 10-year ASCVD risk score (Enoch FLORIAN, et al., 2019) is: 42.4%    Values used to calculate the score:      Age: 73 years      Sex: Female      Is Non- : Yes      Diabetic: Yes      Tobacco smoker: Yes      Systolic Blood Pressure: 124 mmHg      Is BP treated: Yes      HDL Cholesterol: 63 mg/dL      Total Cholesterol: 165 mg/dL    Discussed with patient the Pharmacist Collaborative Practice Agreement.  Patient provided verbal and/or electronic (ex. [employer]) consent to participate in the collaborative practice agreement between the pharmacist and referred patient. This is in lieu of paper consent due to COVID-19 precautions and the use of remote/virtual visits.     Luciano Mccray, PharmD  PGY1 Pharmacy Resident  [facility]ication [facility]  Office: [phone]  Fax: [phone]  1/3/2024 3:04 PM        For Pharmacy Admin Tracking Only    Program: Medical Group  CPA in place:  Yes  Recommendation Provided To: Patient/Caregiver: 1 via In person  Intervention Detail: Dose Adjustment: 1, reason: Therapy De-escalation  Intervention Accepted By: Patient/Caregiver: 1  Gap Closed?: Yes   Time Spent (min): 30

## 2024-01-07 DIAGNOSIS — F51.01 PRIMARY INSOMNIA: Primary | ICD-10-CM

## 2024-01-08 RX ORDER — ZOLPIDEM TARTRATE 10 MG/1
10 TABLET ORAL NIGHTLY PRN
Qty: 90 TABLET | Refills: 1 | Status: SHIPPED | OUTPATIENT
Start: 2024-01-08 | End: 2024-07-06

## 2024-01-29 DIAGNOSIS — E11.69 TYPE 2 DIABETES MELLITUS WITH HYPERLIPIDEMIA (HCC): ICD-10-CM

## 2024-01-29 DIAGNOSIS — I15.2 HYPERTENSION ASSOCIATED WITH DIABETES (HCC): ICD-10-CM

## 2024-01-29 DIAGNOSIS — E11.59 HYPERTENSION ASSOCIATED WITH DIABETES (HCC): ICD-10-CM

## 2024-01-29 DIAGNOSIS — E78.5 TYPE 2 DIABETES MELLITUS WITH HYPERLIPIDEMIA (HCC): ICD-10-CM

## 2024-01-29 RX ORDER — LOSARTAN POTASSIUM 100 MG/1
TABLET ORAL
Qty: 90 TABLET | Refills: 3 | Status: SHIPPED | OUTPATIENT
Start: 2024-01-29

## 2024-02-13 DIAGNOSIS — E78.5 TYPE 2 DIABETES MELLITUS WITH HYPERLIPIDEMIA (HCC): ICD-10-CM

## 2024-02-13 DIAGNOSIS — I15.2 HYPERTENSION ASSOCIATED WITH DIABETES (HCC): ICD-10-CM

## 2024-02-13 DIAGNOSIS — E11.69 TYPE 2 DIABETES MELLITUS WITH HYPERLIPIDEMIA (HCC): ICD-10-CM

## 2024-02-13 DIAGNOSIS — E11.59 HYPERTENSION ASSOCIATED WITH DIABETES (HCC): ICD-10-CM

## 2024-02-13 RX ORDER — INSULIN GLARGINE 300 U/ML
INJECTION, SOLUTION SUBCUTANEOUS
Qty: 3 ML | Refills: 2 | Status: SHIPPED | OUTPATIENT
Start: 2024-02-13

## 2024-02-23 DIAGNOSIS — E11.69 TYPE 2 DIABETES MELLITUS WITH HYPERLIPIDEMIA (HCC): ICD-10-CM

## 2024-02-23 DIAGNOSIS — I70.0 THORACIC AORTA ATHEROSCLEROSIS (HCC): ICD-10-CM

## 2024-02-23 DIAGNOSIS — E78.5 TYPE 2 DIABETES MELLITUS WITH HYPERLIPIDEMIA (HCC): ICD-10-CM

## 2024-02-23 RX ORDER — EZETIMIBE 10 MG/1
10 TABLET ORAL DAILY
Qty: 90 TABLET | Refills: 1 | Status: SHIPPED | OUTPATIENT
Start: 2024-02-23

## 2024-04-01 DIAGNOSIS — E11.59 HYPERTENSION ASSOCIATED WITH DIABETES (HCC): ICD-10-CM

## 2024-04-01 DIAGNOSIS — I25.10 ATHEROSCLEROSIS OF NATIVE CORONARY ARTERY OF NATIVE HEART WITHOUT ANGINA PECTORIS: ICD-10-CM

## 2024-04-01 DIAGNOSIS — I15.2 HYPERTENSION ASSOCIATED WITH DIABETES (HCC): ICD-10-CM

## 2024-04-01 RX ORDER — ATORVASTATIN CALCIUM 40 MG/1
TABLET, FILM COATED ORAL
Qty: 90 TABLET | Refills: 3 | Status: SHIPPED | OUTPATIENT
Start: 2024-04-01

## 2024-04-01 RX ORDER — HYDROCHLOROTHIAZIDE 12.5 MG/1
CAPSULE, GELATIN COATED ORAL
Qty: 90 CAPSULE | Refills: 3 | Status: SHIPPED | OUTPATIENT
Start: 2024-04-01

## 2024-04-04 ENCOUNTER — TELEPHONE (OUTPATIENT)
Dept: CASE MANAGEMENT | Age: 74
End: 2024-04-04

## 2024-04-10 ENCOUNTER — OFFICE VISIT (OUTPATIENT)
Dept: INTERNAL MEDICINE CLINIC | Age: 74
End: 2024-04-10
Payer: MEDICARE

## 2024-04-10 VITALS
DIASTOLIC BLOOD PRESSURE: 72 MMHG | WEIGHT: 164.2 LBS | SYSTOLIC BLOOD PRESSURE: 122 MMHG | HEART RATE: 88 BPM | BODY MASS INDEX: 26.5 KG/M2

## 2024-04-10 DIAGNOSIS — E78.5 HYPERLIPIDEMIA, UNSPECIFIED HYPERLIPIDEMIA TYPE: ICD-10-CM

## 2024-04-10 DIAGNOSIS — E11.69 TYPE 2 DIABETES MELLITUS WITH HYPERLIPIDEMIA (HCC): Primary | ICD-10-CM

## 2024-04-10 DIAGNOSIS — E78.5 TYPE 2 DIABETES MELLITUS WITH HYPERLIPIDEMIA (HCC): Primary | ICD-10-CM

## 2024-04-10 LAB — HBA1C MFR BLD: 7.7 %

## 2024-04-10 PROCEDURE — 3017F COLORECTAL CA SCREEN DOC REV: CPT

## 2024-04-10 PROCEDURE — 2022F DILAT RTA XM EVC RTNOPTHY: CPT

## 2024-04-10 PROCEDURE — 90000 NO LOS: CPT

## 2024-04-10 PROCEDURE — 83036 HEMOGLOBIN GLYCOSYLATED A1C: CPT

## 2024-04-10 PROCEDURE — APPNB60 APP NON BILLABLE TIME 46-60 MINS

## 2024-04-10 PROCEDURE — G8399 PT W/DXA RESULTS DOCUMENT: HCPCS

## 2024-04-10 PROCEDURE — 1090F PRES/ABSN URINE INCON ASSESS: CPT

## 2024-04-10 PROCEDURE — 3078F DIAST BP <80 MM HG: CPT

## 2024-04-10 PROCEDURE — 3074F SYST BP LT 130 MM HG: CPT

## 2024-04-10 PROCEDURE — 3051F HG A1C>EQUAL 7.0%<8.0%: CPT

## 2024-04-10 PROCEDURE — G8417 CALC BMI ABV UP PARAM F/U: HCPCS

## 2024-04-10 PROCEDURE — 4004F PT TOBACCO SCREEN RCVD TLK: CPT

## 2024-04-10 PROCEDURE — G8427 DOCREV CUR MEDS BY ELIG CLIN: HCPCS

## 2024-04-10 PROCEDURE — 1123F ACP DISCUSS/DSCN MKR DOCD: CPT

## 2024-04-10 NOTE — PROGRESS NOTES
40s on occasion (shaky)  Eye exam current (within one year): unknown  Tobacco history: She  reports that she has been smoking cigarettes. She has a 37.5 pack-year smoking history. She has never used smokeless tobacco. not interested in quitting now. Hasn't thought about it. Triggers include meals and coffee, she smokes 3/4 PPD, not smoking the whole cigarette bc they don't taste as good.     Current diabetes medications:  metformin 1 g BID, Trulicity 1.5 mg weekly, Toujeo 12 units nightly  Compliance:  compliant all of the time   Side effects:  Feels \"funny\" on 2nd day after trulicity injections-appetite decreased substantially. Now resolved but worried about increasing trulicity dose.   Cost: free/affordable   Previous treatment regimens / response: states carrera dropped her BG down to 30. Prescribed ozempic, but $500    Home blood sugar records:   Kiet 8/3/22 10/12/22 12/7/22 3/1/23 6/2/23 1/3/24 4/10/24   Avg 159 168 169 151 181 132 155   V high % 3 5 4 1 5 2 6   High % 22 30 32 21 41 13 23   Target % 75 64 64 78 54 80 70   Low % 0 1 0 0 0 5 1     Hypertension:     On Ace/ARB- losartan 100 mg daily  On HCTZ 12.5 mg daily  Medication side effects: none.    Use of agents associated with hypertension: none.   Home blood pressure monitoring: none    Hyperlipidemia:  atorva 40mg-- consider incr to 80 mg if LDL remains >70  On aspirin 81 mg    Physical Exam  /72 (Site: Right Upper Arm, Position: Sitting)   Pulse 88   Wt 74.5 kg (164 lb 3.2 oz)   BMI 26.50 kg/m²    Body mass index is 26.5 kg/m².  Wt Readings from Last 3 Encounters:   04/10/24 74.5 kg (164 lb 3.2 oz)   01/03/24 74.4 kg (164 lb)   11/10/23 72.6 kg (160 lb)      BP Readings from Last 3 Encounters:   04/10/24 122/72   01/03/24 124/78   11/10/23 122/70        Pertinent Labs:  Lab Results   Component Value Date    LABA1C 7.7 04/10/2024    LABA1C 7.1 01/03/2024    LABA1C 8.0 09/13/2023      Lab Results   Component Value Date    MALBCR see below

## 2024-04-10 NOTE — PATIENT INSTRUCTIONS
-Discontinue Toujeo and start Jardiance 25 mg daily in the morning  -Due for fasting lipid panel (8 hours of fasting) prior to next visit  -Fill out first 3 pages of patient assistance program forms for Jardiance - return to Jovanna Simmons RN

## 2024-05-07 ENCOUNTER — HOSPITAL ENCOUNTER (OUTPATIENT)
Age: 74
Discharge: HOME | End: 2024-05-07
Payer: MEDICARE

## 2024-05-07 DIAGNOSIS — E11.69 TYPE 2 DIABETES MELLITUS WITH HYPERLIPIDEMIA (HCC): ICD-10-CM

## 2024-05-07 DIAGNOSIS — E78.5 TYPE 2 DIABETES MELLITUS WITH HYPERLIPIDEMIA (HCC): ICD-10-CM

## 2024-05-07 DIAGNOSIS — E78.5 HYPERLIPIDEMIA, UNSPECIFIED HYPERLIPIDEMIA TYPE: ICD-10-CM

## 2024-05-07 LAB
ANION GAP SERPL CALCULATED.3IONS-SCNC: 14 MMOL/L (ref 3–16)
BUN SERPL-MCNC: 26 MG/DL (ref 7–20)
CALCIUM SERPL-MCNC: 10 MG/DL (ref 8.3–10.6)
CHLORIDE SERPL-SCNC: 102 MMOL/L (ref 99–110)
CHOLEST SERPL-MCNC: 137 MG/DL (ref 0–199)
CO2 SERPL-SCNC: 19 MMOL/L (ref 21–32)
CREAT SERPL-MCNC: 0.9 MG/DL (ref 0.6–1.2)
GFR SERPLBLD CREATININE-BSD FMLA CKD-EPI: 67 ML/MIN/{1.73_M2}
GLUCOSE SERPL-MCNC: 140 MG/DL (ref 70–99)
HDLC SERPL-MCNC: 74 MG/DL (ref 40–60)
LDLC SERPL CALC-MCNC: 52 MG/DL
POTASSIUM SERPL-SCNC: 4.7 MMOL/L (ref 3.5–5.1)
SODIUM SERPL-SCNC: 135 MMOL/L (ref 136–145)
TRIGL SERPL-MCNC: 57 MG/DL (ref 0–150)
VLDLC SERPL CALC-MCNC: 11 MG/DL

## 2024-05-07 PROCEDURE — 36415 COLL VENOUS BLD VENIPUNCTURE: CPT

## 2024-05-07 PROCEDURE — 80048 BASIC METABOLIC PNL TOTAL CA: CPT

## 2024-05-07 PROCEDURE — 80061 LIPID PANEL: CPT

## 2024-05-08 ENCOUNTER — OFFICE VISIT (OUTPATIENT)
Dept: INTERNAL MEDICINE CLINIC | Age: 74
End: 2024-05-08

## 2024-05-08 DIAGNOSIS — E78.5 TYPE 2 DIABETES MELLITUS WITH HYPERLIPIDEMIA (HCC): Primary | ICD-10-CM

## 2024-05-08 DIAGNOSIS — E11.69 TYPE 2 DIABETES MELLITUS WITH HYPERLIPIDEMIA (HCC): Primary | ICD-10-CM

## 2024-05-08 PROCEDURE — 90000 NO LOS: CPT

## 2024-05-08 PROCEDURE — APPNB60 APP NON BILLABLE TIME 46-60 MINS

## 2024-05-08 RX ORDER — INSULIN GLARGINE 100 [IU]/ML
10 INJECTION, SOLUTION SUBCUTANEOUS NIGHTLY
Qty: 5 ADJUSTABLE DOSE PRE-FILLED PEN SYRINGE | Refills: 2 | Status: SHIPPED | OUTPATIENT
Start: 2024-05-08

## 2024-05-08 NOTE — PATIENT INSTRUCTIONS
Stop jardiance  4 bottles of water per day    Resume 10 units of insulin daily if blood sugars elevated.

## 2024-05-08 NOTE — PROGRESS NOTES
CLINICAL PHARMACY NOTE--Diabetes    Aracelis Malone is a 74 y.o. female with PMHX significant for CAD, HTN and Type 2 Diabetes referred by Dr FRANCESCA Glover for diabetes counseling and medication management.     Interval update:  Last visit, stopped Toujeo and started jardiance 25 mg. She is not tolerating well. Libreview reports shows excellent control. She no longer has low BGs, but is feeling very tired, lightheaded. Admits she is only drinking 2 bottles of water per day. Has improved her diet and plans to start walking soon.       ASSESSMENT/PLAN  1. Type 2 Diabetes  A1c above goal (7.7% on 4/10/24)  -hold Jardiance (likely dehydrated), increase fluids, call if no improvement in sxs  -resume Lantus 10 units daily if FBG > 150  -consider retrial Jardiance 10 mg (and d/c hctz) in the future (will need PAP)  -Continue Trulicity 1.5 mg weekly ([facility] PAP) and metformin 1g BID  -Start walking 2-3 days per week, try sugar-free sweets  -Referred to [facility], dietitian  -repeat BMP next visit    2. Hypertension  BP at goal <130/80 without albuminuria  Continue ARB and HCTZ    3. Hyperlipidemia  Ldl 52 (goal <55)  Continue atorvastatin 40 mg QD + zetia 10 mg qd    Follow up: 1 month    Health Maintenance Due   Topic Date Due    Shingles vaccine (1 of 2) Never done    Respiratory Syncytial Virus (RSV) Pregnant or age 60 yrs+ (1 - 1-dose 60+ series) Never done    DTaP/Tdap/Td vaccine (2 - Td or Tdap) 04/09/2023    COVID-19 Vaccine (4 - 2023-24 season) 09/01/2023    Low dose CT lung screening &/or counseling  04/25/2024       ---------------------------------------------------------------------------------------------------------------------------------------      SUBJECTIVE/OBJECTIVE  Treatment Adherence:  Diet: 1-2 meals per day (12p, 7p), snacks on a lot of fruit. Decrease in appetite since starting trulicity  Exercise: no regular exercise  Weight trend: decreasing past month  Barriers: cost of medication (has pt

## 2024-05-09 VITALS — BODY MASS INDEX: 25.7 KG/M2 | SYSTOLIC BLOOD PRESSURE: 110 MMHG | WEIGHT: 159.2 LBS | DIASTOLIC BLOOD PRESSURE: 68 MMHG

## 2024-05-13 ENCOUNTER — OFFICE VISIT (OUTPATIENT)
Dept: INTERNAL MEDICINE CLINIC | Age: 74
End: 2024-05-13
Payer: MEDICARE

## 2024-05-13 VITALS
WEIGHT: 160.8 LBS | BODY MASS INDEX: 25.84 KG/M2 | SYSTOLIC BLOOD PRESSURE: 120 MMHG | RESPIRATION RATE: 16 BRPM | HEIGHT: 66 IN | TEMPERATURE: 97.9 F | DIASTOLIC BLOOD PRESSURE: 72 MMHG

## 2024-05-13 DIAGNOSIS — I15.2 HYPERTENSION ASSOCIATED WITH DIABETES (HCC): ICD-10-CM

## 2024-05-13 DIAGNOSIS — E11.59 HYPERTENSION ASSOCIATED WITH DIABETES (HCC): ICD-10-CM

## 2024-05-13 DIAGNOSIS — Z00.00 MEDICARE ANNUAL WELLNESS VISIT, SUBSEQUENT: Primary | ICD-10-CM

## 2024-05-13 DIAGNOSIS — J43.2 CENTRILOBULAR EMPHYSEMA (HCC): ICD-10-CM

## 2024-05-13 DIAGNOSIS — Z23 NEED FOR SHINGLES VACCINE: ICD-10-CM

## 2024-05-13 DIAGNOSIS — Z29.11 NEED FOR RSV IMMUNIZATION: ICD-10-CM

## 2024-05-13 DIAGNOSIS — I70.0 THORACIC AORTA ATHEROSCLEROSIS (HCC): ICD-10-CM

## 2024-05-13 DIAGNOSIS — Z87.891 PERSONAL HISTORY OF TOBACCO USE: ICD-10-CM

## 2024-05-13 PROCEDURE — G0439 PPPS, SUBSEQ VISIT: HCPCS | Performed by: INTERNAL MEDICINE

## 2024-05-13 PROCEDURE — 3078F DIAST BP <80 MM HG: CPT | Performed by: INTERNAL MEDICINE

## 2024-05-13 PROCEDURE — 3017F COLORECTAL CA SCREEN DOC REV: CPT | Performed by: INTERNAL MEDICINE

## 2024-05-13 PROCEDURE — 3051F HG A1C>EQUAL 7.0%<8.0%: CPT | Performed by: INTERNAL MEDICINE

## 2024-05-13 PROCEDURE — 3074F SYST BP LT 130 MM HG: CPT | Performed by: INTERNAL MEDICINE

## 2024-05-13 PROCEDURE — G0296 VISIT TO DETERM LDCT ELIG: HCPCS | Performed by: INTERNAL MEDICINE

## 2024-05-13 PROCEDURE — 1123F ACP DISCUSS/DSCN MKR DOCD: CPT | Performed by: INTERNAL MEDICINE

## 2024-05-13 RX ORDER — ZOSTER VACCINE RECOMBINANT, ADJUVANTED 50 MCG/0.5
0.5 KIT INTRAMUSCULAR SEE ADMIN INSTRUCTIONS
Qty: 0.5 ML | Refills: 0 | Status: SHIPPED | OUTPATIENT
Start: 2024-05-13 | End: 2024-05-14

## 2024-05-13 SDOH — ECONOMIC STABILITY: FOOD INSECURITY: WITHIN THE PAST 12 MONTHS, THE FOOD YOU BOUGHT JUST DIDN'T LAST AND YOU DIDN'T HAVE MONEY TO GET MORE.: NEVER TRUE

## 2024-05-13 SDOH — ECONOMIC STABILITY: FOOD INSECURITY: WITHIN THE PAST 12 MONTHS, YOU WORRIED THAT YOUR FOOD WOULD RUN OUT BEFORE YOU GOT MONEY TO BUY MORE.: NEVER TRUE

## 2024-05-13 SDOH — ECONOMIC STABILITY: INCOME INSECURITY: HOW HARD IS IT FOR YOU TO PAY FOR THE VERY BASICS LIKE FOOD, HOUSING, MEDICAL CARE, AND HEATING?: NOT HARD AT ALL

## 2024-05-13 ASSESSMENT — PATIENT HEALTH QUESTIONNAIRE - PHQ9
2. FEELING DOWN, DEPRESSED OR HOPELESS: NOT AT ALL
SUM OF ALL RESPONSES TO PHQ QUESTIONS 1-9: 0
SUM OF ALL RESPONSES TO PHQ9 QUESTIONS 1 & 2: 0
1. LITTLE INTEREST OR PLEASURE IN DOING THINGS: NOT AT ALL
SUM OF ALL RESPONSES TO PHQ QUESTIONS 1-9: 0

## 2024-05-13 ASSESSMENT — LIFESTYLE VARIABLES: HOW OFTEN DO YOU HAVE A DRINK CONTAINING ALCOHOL: NEVER

## 2024-05-13 NOTE — PROGRESS NOTES
Medicare Annual Wellness Visit    Aracelis Malone is here for Medicare AWV (Annual Medicare well Visit)    Assessment & Plan   Medicare annual wellness visit, subsequent  Centrilobular emphysema (HCC)  -     [place] VISIT TO DISCUSS LUNG CA SCREEN W LDCT  -     CT Lung Screen (Initial/Annual/Baseline); Future  Thoracic aorta atherosclerosis (HCC)  Comments:  Asymptomatic.  She is on Lipitor at a moderate dose.  She also has coronary artery calcifications  Hypertension associated with diabetes (HCC)  Comments:  Blood pressure is excellent on Losartan.  She is not taking any more medication.  Personal history of tobacco use  -     [place] VISIT TO DISCUSS LUNG CA SCREEN W LDCT  -     CT Lung Screen (Initial/Annual/Baseline); Future  Need for RSV immunization  -     respiratory syncytial vaccine, adjuvanted (AREXVY) 120 MCG/0.5ML injection; Inject 0.5 mLs into the muscle once for 1 dose, Disp-0.5 mL, R-0Print  Need for shingles vaccine  -     zoster recombinant adjuvanted vaccine (SHINGRIX) 50 MCG/0.5ML SUSR injection; Inject 0.5 mLs into the muscle See Admin Instructions for 1 day, Disp-0.5 mL, R-0Print    Recommendations for Preventive Services Due: see orders and patient instructions/AVS.  Recommended screening schedule for the next 5-10 years is provided to the patient in written form: see Patient Instructions/AVS.     No follow-ups on file.     Subjective   The following acute and/or chronic problems were also addressed today:  Hypertension, hyperlipidemia, diabetes    Patient's complete Health Risk Assessment and screening values have been reviewed and are found in Flowsheets. The following problems were reviewed today and where indicated follow up appointments were made and/or referrals ordered.    Positive Risk Factor Screenings with Interventions:                 Dentist Screen:  Have you seen the dentist within the past year?: (!) No    Intervention:  Advised to schedule with their dentist      Safety:  Do you

## 2024-05-13 NOTE — PATIENT INSTRUCTIONS
Aging online.  You need 9189-4814 mg of calcium and 7568-6989 IU of vitamin D per day. It is possible to meet your calcium requirement with diet alone, but a vitamin D supplement is usually necessary to meet this goal.  When exposed to the sun, use a sunscreen that protects against both UVA and UVB radiation with an SPF of 30 or greater. Reapply every 2 to 3 hours or after sweating, drying off with a towel, or swimming.  Always wear a seat belt when traveling in a car. Always wear a helmet when riding a bicycle or motorcycle.

## 2024-05-15 DIAGNOSIS — M54.50 CHRONIC RIGHT-SIDED LOW BACK PAIN WITHOUT SCIATICA: ICD-10-CM

## 2024-05-15 DIAGNOSIS — G89.29 CHRONIC RIGHT-SIDED LOW BACK PAIN WITHOUT SCIATICA: ICD-10-CM

## 2024-05-15 RX ORDER — IBUPROFEN 600 MG/1
600 TABLET ORAL EVERY 8 HOURS PRN
Qty: 90 TABLET | Refills: 1 | Status: SHIPPED | OUTPATIENT
Start: 2024-05-15

## 2024-05-15 NOTE — TELEPHONE ENCOUNTER
Recent Visits  Date Type Provider Dept   05/13/24 Office Visit Chas Glover MD [facility]   11/10/23 Office Visit Chas Glover MD [facility]   05/10/23 Office Visit Chas Glover MD [facility]   03/17/23 Office Visit Chas Glover MD [facility]   Showing recent visits within past 540 days with a meds authorizing provider and meeting all other requirements  Future Appointments  No visits were found meeting these conditions.  Showing future appointments within next 150 days with a meds authorizing provider and meeting all other requirements     5/13/2024

## 2024-05-20 DIAGNOSIS — E78.5 TYPE 2 DIABETES MELLITUS WITH HYPERLIPIDEMIA (HCC): ICD-10-CM

## 2024-05-20 DIAGNOSIS — E11.69 TYPE 2 DIABETES MELLITUS WITH HYPERLIPIDEMIA (HCC): ICD-10-CM

## 2024-06-03 DIAGNOSIS — I15.2 HYPERTENSION ASSOCIATED WITH DIABETES (HCC): ICD-10-CM

## 2024-06-03 DIAGNOSIS — E11.69 TYPE 2 DIABETES MELLITUS WITH HYPERLIPIDEMIA (HCC): ICD-10-CM

## 2024-06-03 DIAGNOSIS — E78.5 TYPE 2 DIABETES MELLITUS WITH HYPERLIPIDEMIA (HCC): ICD-10-CM

## 2024-06-03 DIAGNOSIS — E11.59 HYPERTENSION ASSOCIATED WITH DIABETES (HCC): ICD-10-CM

## 2024-06-03 NOTE — TELEPHONE ENCOUNTER
Recent Visits  Date Type Provider Dept   05/13/24 Office Visit Chas Glover MD [facility]   11/10/23 Office Visit Chas Glover MD [facility]   05/10/23 Office Visit Chas Glover MD [facility]   03/17/23 Office Visit Chas Glover MD [facility]   Showing recent visits within past 540 days with a meds authorizing provider and meeting all other requirements  Future Appointments  No visits were found meeting these conditions.  Showing future appointments within next 150 days with a meds authorizing provider and meeting all other requirements     5/13/2024

## 2024-06-05 ENCOUNTER — OFFICE VISIT (OUTPATIENT)
Dept: INTERNAL MEDICINE CLINIC | Age: 74
End: 2024-06-05

## 2024-06-05 VITALS — SYSTOLIC BLOOD PRESSURE: 124 MMHG | DIASTOLIC BLOOD PRESSURE: 66 MMHG | WEIGHT: 161 LBS | BODY MASS INDEX: 25.99 KG/M2

## 2024-06-05 DIAGNOSIS — E11.69 TYPE 2 DIABETES MELLITUS WITH HYPERLIPIDEMIA (HCC): Primary | ICD-10-CM

## 2024-06-05 DIAGNOSIS — E78.5 TYPE 2 DIABETES MELLITUS WITH HYPERLIPIDEMIA (HCC): Primary | ICD-10-CM

## 2024-06-05 DIAGNOSIS — I15.2 HYPERTENSION ASSOCIATED WITH DIABETES (HCC): ICD-10-CM

## 2024-06-05 DIAGNOSIS — E78.5 HYPERLIPIDEMIA, UNSPECIFIED HYPERLIPIDEMIA TYPE: ICD-10-CM

## 2024-06-05 DIAGNOSIS — E11.59 HYPERTENSION ASSOCIATED WITH DIABETES (HCC): ICD-10-CM

## 2024-06-05 PROCEDURE — APPNB60 APP NON BILLABLE TIME 46-60 MINS

## 2024-06-05 NOTE — PROGRESS NOTES
CLINICAL PHARMACY NOTE--Diabetes    Aracelis Malone is a 74 y.o. female with PMHX significant for CAD, HTN and Type 2 Diabetes referred by Dr FRANCESCA Glover for diabetes counseling and medication management.     Interval update:  Last visit, stopped Jardiance + Hctz-- feeling much better. BP at goal. Pt is NOT using insulin.  Avg glucose now 137!!   Changed her eating habits - eating much healthier, drinking more water, walking.       ASSESSMENT/PLAN   Diagnosis Orders   1. Type 2 diabetes mellitus with hyperlipidemia (HCC) Improving Hemoglobin A1C    Basic Metabolic Panel    A1c above goal (7.7%)  -stay off Lantus and Jardiance  -Trulicity 1.5 mg weekly (PAP) + metformin 1g BID  -walk 2-3d/wk, try sugar-free sweets  -BMP+A1c in July      2. Hypertension associated with diabetes (HCC) Controlled     BP at goal <130/80 without albuminuria  Continue ARB, remain off hctz      3. Hyperlipidemia, unspecified hyperlipidemia type Controlled     Ldl 52 (goal<55)  atorvastatin 40 mg QD + zetia 10 mg qd        Follow up: 1 month      Health Maintenance Due   Topic Date Due    Shingles vaccine (1 of 2) Never done    Respiratory Syncytial Virus (RSV) Pregnant or age 60 yrs+ (1 - 1-dose 60+ series) Never done    DTaP/Tdap/Td vaccine (2 - Td or Tdap) 04/09/2023    COVID-19 Vaccine (4 - 2023-24 season) 09/01/2023    Low dose CT lung screening &/or counseling  04/25/2024       ---------------------------------------------------------------------------------------------------------------------------------------      SUBJECTIVE/OBJECTIVE  Treatment Adherence:  Diet: 1-2 meals per day (12p, 7p), snacks on a lot of fruit. Decrease in appetite since starting trulicity  Exercise: no regular exercise  Weight trend: decreasing past month  Barriers: cost of medication (has pt assistance), strips $178 so using jak instead  -declined [facility], dietitian     Type 2 Diabetes Mellitus  Comorbities:  ASCVD  Considerations: minimize

## 2024-06-18 ENCOUNTER — HOSPITAL ENCOUNTER (OUTPATIENT)
Dept: CT IMAGING | Age: 74
Discharge: HOME | End: 2024-06-18
Attending: INTERNAL MEDICINE
Payer: MEDICARE

## 2024-06-18 ENCOUNTER — HOSPITAL ENCOUNTER (OUTPATIENT)
Dept: WOMENS IMAGING | Age: 74
Discharge: HOME | End: 2024-06-18
Attending: INTERNAL MEDICINE
Payer: MEDICARE

## 2024-06-18 VITALS — BODY MASS INDEX: 26.03 KG/M2 | HEIGHT: 66 IN | WEIGHT: 162 LBS

## 2024-06-18 DIAGNOSIS — Z12.31 VISIT FOR SCREENING MAMMOGRAM: ICD-10-CM

## 2024-06-18 DIAGNOSIS — Z87.891 PERSONAL HISTORY OF TOBACCO USE: ICD-10-CM

## 2024-06-18 DIAGNOSIS — J43.2 CENTRILOBULAR EMPHYSEMA (HCC): ICD-10-CM

## 2024-06-18 PROCEDURE — 71271 CT THORAX LUNG CANCER SCR C-: CPT

## 2024-06-18 PROCEDURE — 77063 BREAST TOMOSYNTHESIS BI: CPT

## 2024-07-08 DIAGNOSIS — F51.01 PRIMARY INSOMNIA: ICD-10-CM

## 2024-07-08 RX ORDER — ZOLPIDEM TARTRATE 10 MG/1
TABLET ORAL
Qty: 90 TABLET | Refills: 0 | Status: SHIPPED | OUTPATIENT
Start: 2024-07-08 | End: 2024-10-06

## 2024-07-08 NOTE — TELEPHONE ENCOUNTER
Recent Visits  Date Type Provider Dept   05/13/24 Office Visit Chas Glover MD [facility]   11/10/23 Office Visit Chas Glover MD [facility]   05/10/23 Office Visit Chas Glover MD [facility]   03/17/23 Office Visit Chas Glover MD [facility]   Showing recent visits within past 540 days with a meds authorizing provider and meeting all other requirements  Future Appointments  Date Type Provider Dept   11/15/24 Appointment Chas Glover MD [facility]   Showing future appointments within next 150 days with a meds authorizing provider and meeting all other requirements     6/5/2024

## 2024-07-12 ENCOUNTER — TELEPHONE (OUTPATIENT)
Dept: INTERNAL MEDICINE CLINIC | Age: 74
End: 2024-07-12

## 2024-07-12 NOTE — TELEPHONE ENCOUNTER
A representative called from [facility] requesting the after visit summary for the patient to approve medications.    Fax: [phone]

## 2024-07-17 ENCOUNTER — OFFICE VISIT (OUTPATIENT)
Dept: INTERNAL MEDICINE CLINIC | Age: 74
End: 2024-07-17
Payer: MEDICARE

## 2024-07-17 VITALS — HEART RATE: 88 BPM | DIASTOLIC BLOOD PRESSURE: 73 MMHG | OXYGEN SATURATION: 97 % | SYSTOLIC BLOOD PRESSURE: 117 MMHG

## 2024-07-17 DIAGNOSIS — E78.5 HYPERLIPIDEMIA, UNSPECIFIED HYPERLIPIDEMIA TYPE: ICD-10-CM

## 2024-07-17 DIAGNOSIS — E11.69 TYPE 2 DIABETES MELLITUS WITH HYPERLIPIDEMIA (HCC): Primary | ICD-10-CM

## 2024-07-17 DIAGNOSIS — E78.5 TYPE 2 DIABETES MELLITUS WITH HYPERLIPIDEMIA (HCC): Primary | ICD-10-CM

## 2024-07-17 DIAGNOSIS — E11.59 HYPERTENSION ASSOCIATED WITH DIABETES (HCC): ICD-10-CM

## 2024-07-17 DIAGNOSIS — I15.2 HYPERTENSION ASSOCIATED WITH DIABETES (HCC): ICD-10-CM

## 2024-07-17 LAB — HBA1C MFR BLD: 7 %

## 2024-07-17 PROCEDURE — 83036 HEMOGLOBIN GLYCOSYLATED A1C: CPT | Performed by: INTERNAL MEDICINE

## 2024-07-17 PROCEDURE — APPNB60 APP NON BILLABLE TIME 46-60 MINS

## 2024-07-17 NOTE — PROGRESS NOTES
CLINICAL PHARMACY NOTE--Diabetes    Aracelis Malone is a 74 y.o. female with PMHX significant for CAD, HTN and Type 2 Diabetes referred by Dr FRANCESCA Glover for diabetes counseling and medication management.     Interval update:  Remains off insulin, Jardiance + Hctz-- feeling much better. BP at goal.  Avg glucose now 152!   Changed eating habits - eating much healthier, drinking more water, walking. Using CGM, still covered bc of recurrent hypoglycemia    ASSESSMENT/PLAN   Diagnosis Orders   1. Type 2 diabetes mellitus with hyperlipidemia (HCC) Controlled POCT glycosylated hemoglobin (Hb A1C)    A1c 7%   -stay off Lantus and Jardiance  -cont Trulicity 1.5 mg weekly (PAP) + metformin 1g BID  -walk 2-3d/wk, sugar-free sweets  -renew PAP next visit      2. Hypertension associated with diabetes (HCC) Controlled     BP at goal <130/80 without albuminuria  Continue ARB      3. Hyperlipidemia, unspecified hyperlipidemia type Controlled     Ldl 52 (goal<55)  atorvastatin 40 mg QD + zetia 10 mg qd          Follow up: 3 month      Health Maintenance Due   Topic Date Due    Shingles vaccine (1 of 2) Never done    Respiratory Syncytial Virus (RSV) Pregnant or age 60 yrs+ (1 - 1-dose 60+ series) Never done    DTaP/Tdap/Td vaccine (2 - Td or Tdap) 04/09/2023    COVID-19 Vaccine (4 - 2023-24 season) 09/01/2023       ---------------------------------------------------------------------------------------------------------------------------------------      SUBJECTIVE/OBJECTIVE  Treatment Adherence:  Diet: 1-2 meals per day (12p, 7p), snacks on a lot of fruit. Decrease in appetite since starting trulicity  Exercise: no regular exercise  Weight trend: decreasing past month  Barriers: cost of medication (has pt assistance), strips $178 so using jak instead  -declined [facility], dietitian     Type 2 Diabetes Mellitus  Comorbities:  ASCVD  Considerations: minimize hypoglycemia, cost of medication  Current symptoms/problems include

## 2024-08-29 DIAGNOSIS — E78.5 TYPE 2 DIABETES MELLITUS WITH HYPERLIPIDEMIA (HCC): ICD-10-CM

## 2024-08-29 DIAGNOSIS — I70.0 THORACIC AORTA ATHEROSCLEROSIS (HCC): ICD-10-CM

## 2024-08-29 DIAGNOSIS — E11.69 TYPE 2 DIABETES MELLITUS WITH HYPERLIPIDEMIA (HCC): ICD-10-CM

## 2024-08-29 RX ORDER — EZETIMIBE 10 MG/1
10 TABLET ORAL DAILY
Qty: 90 TABLET | Refills: 1 | Status: SHIPPED | OUTPATIENT
Start: 2024-08-29

## 2024-08-29 NOTE — TELEPHONE ENCOUNTER
Recent Visits  Date Type Provider Dept   05/13/24 Office Visit Chas Glover MD [facility]   11/10/23 Office Visit Chas Glover MD [facility]   05/10/23 Office Visit Chas Glover MD [facility]   03/17/23 Office Visit Chas Glover MD [facility]   Showing recent visits within past 540 days with a meds authorizing provider and meeting all other requirements  Future Appointments  Date Type Provider Dept   11/15/24 Appointment Chas Glover MD [facility]   Showing future appointments within next 150 days with a meds authorizing provider and meeting all other requirements     7/17/2024

## 2024-10-08 DIAGNOSIS — F51.01 PRIMARY INSOMNIA: ICD-10-CM

## 2024-10-08 NOTE — TELEPHONE ENCOUNTER
Recent Visits  Date Type Provider Dept   05/13/24 Office Visit Chas Glover MD [facility]   11/10/23 Office Visit Chas Glover MD [facility]   05/10/23 Office Visit Chas Glover MD [facility]   Showing recent visits within past 540 days with a meds authorizing provider and meeting all other requirements  Future Appointments  Date Type Provider Dept   11/15/24 Appointment Chas Glover MD [facility]   Showing future appointments within next 150 days with a meds authorizing provider and meeting all other requirements     7/17/2024

## 2024-10-09 RX ORDER — ZOLPIDEM TARTRATE 10 MG/1
TABLET ORAL
Qty: 90 TABLET | Refills: 1 | Status: SHIPPED | OUTPATIENT
Start: 2024-10-09 | End: 2025-01-07

## 2024-10-23 ENCOUNTER — OFFICE VISIT (OUTPATIENT)
Dept: INTERNAL MEDICINE CLINIC | Age: 74
End: 2024-10-23
Payer: MEDICARE

## 2024-10-23 VITALS — BODY MASS INDEX: 26.18 KG/M2 | DIASTOLIC BLOOD PRESSURE: 64 MMHG | SYSTOLIC BLOOD PRESSURE: 112 MMHG | WEIGHT: 162.2 LBS

## 2024-10-23 DIAGNOSIS — E11.59 HYPERTENSION ASSOCIATED WITH DIABETES (HCC): ICD-10-CM

## 2024-10-23 DIAGNOSIS — E11.69 TYPE 2 DIABETES MELLITUS WITH HYPERLIPIDEMIA (HCC): ICD-10-CM

## 2024-10-23 DIAGNOSIS — E78.5 TYPE 2 DIABETES MELLITUS WITH HYPERLIPIDEMIA (HCC): Primary | ICD-10-CM

## 2024-10-23 DIAGNOSIS — E11.69 TYPE 2 DIABETES MELLITUS WITH HYPERLIPIDEMIA (HCC): Primary | ICD-10-CM

## 2024-10-23 DIAGNOSIS — E78.5 TYPE 2 DIABETES MELLITUS WITH HYPERLIPIDEMIA (HCC): ICD-10-CM

## 2024-10-23 DIAGNOSIS — I15.2 HYPERTENSION ASSOCIATED WITH DIABETES (HCC): ICD-10-CM

## 2024-10-23 LAB — HBA1C MFR BLD: 7.4 %

## 2024-10-23 PROCEDURE — 83036 HEMOGLOBIN GLYCOSYLATED A1C: CPT

## 2024-10-23 PROCEDURE — 99999 PR OFFICE/OUTPT VISIT,PROCEDURE ONLY: CPT

## 2024-10-23 NOTE — PROGRESS NOTES
CLINICAL PHARMACY NOTE--Diabetes    Aracelis Malone is a 74 y.o. female with PMHX significant for CAD, HTN and Type 2 Diabetes referred by Dr FRANCESCA Glover for diabetes counseling and medication management.     Interval update:  Pt endorses compliance to Trulicity but has noticed bruising with injections. Injecting into front of lower thigh, just above knee. LibreView shows good control Avg 150- some false overnight lows. Comes back up when rolls over. True hypoglycemia x1 at a wedding and wasn't eating. Doesn't check to verify. Trulicity PAP re-enrollment starts 60 days before expiration.      ASSESSMENT/PLAN   Diagnosis Orders   1. Type 2 diabetes mellitus with hyperlipidemia (HCC)  POCT glycosylated hemoglobin (Hb A1C)    MICROALBUMIN / CREATININE URINE RATIO    -cont Trulicity, metformin  -POC A1c 7.4, doesn't particularly reflect BG  -PAP re-enrollement starts 11/2, bring proof of income + ins cards to [facility]      2. Hypertension associated with diabetes (HCC)      -at goal <130/80  -cont current          Follow up: 3 month      Health Maintenance Due   Topic Date Due    Shingles vaccine (1 of 2) Never done    Respiratory Syncytial Virus (RSV) Pregnant or age 60 yrs+ (1 - 1-dose 60+ series) Never done    DTaP/Tdap/Td vaccine (2 - Td or Tdap) 04/09/2023    Flu vaccine (1) 08/01/2024    COVID-19 Vaccine (4 - 2023-24 season) 09/01/2024    Diabetic foot exam  11/10/2024    Diabetic Alb to Cr ratio (uACR) test  11/10/2024       ---------------------------------------------------------------------------------------------------------------------------------------      SUBJECTIVE/OBJECTIVE  Treatment Adherence:  Diet: 1-2 meals per day (12p, 7p), snacks on a lot of fruit. Decrease in appetite since starting trulicity  Exercise: no regular exercise  Weight trend: decreasing past month  Barriers: cost of medication (has pt assistance), strips $178 so using jak instead  -declined [facility] dietitian     Type 2 Diabetes

## 2024-10-23 NOTE — PATIENT INSTRUCTIONS
Chew 4 glucose tablets if blood sugar <70 - can purchase over the counter at pharmacy  Inject Trulicity into the upper outer thigh   Bring proof of income and insurance cards (medicare and prescription) - give to [facility] Simmons

## 2024-10-24 LAB
CREAT UR-MCNC: 186 MG/DL (ref 28–259)
MICROALBUMIN UR DL<=1MG/L-MCNC: 1.43 MG/DL
MICROALBUMIN/CREAT UR: 7.7 MG/G (ref 0–30)

## 2024-11-13 DIAGNOSIS — M54.50 CHRONIC RIGHT-SIDED LOW BACK PAIN WITHOUT SCIATICA: ICD-10-CM

## 2024-11-13 DIAGNOSIS — G89.29 CHRONIC RIGHT-SIDED LOW BACK PAIN WITHOUT SCIATICA: ICD-10-CM

## 2024-11-13 NOTE — TELEPHONE ENCOUNTER
Recent Visits  Date Type Provider Dept   05/13/24 Office Visit Chas Glover MD [facility]   11/10/23 Office Visit Chas Glover MD [facility]   Showing recent visits within past 540 days with a meds authorizing provider and meeting all other requirements  Future Appointments  Date Type Provider Dept   11/15/24 Appointment Chas Glover MD [facility]   Showing future appointments within next 150 days with a meds authorizing provider and meeting all other requirements     10/23/2024

## 2024-11-14 RX ORDER — IBUPROFEN 600 MG/1
600 TABLET, FILM COATED ORAL EVERY 8 HOURS PRN
Qty: 90 TABLET | Refills: 1 | Status: SHIPPED | OUTPATIENT
Start: 2024-11-14

## 2024-11-15 ENCOUNTER — OFFICE VISIT (OUTPATIENT)
Dept: INTERNAL MEDICINE CLINIC | Age: 74
End: 2024-11-15

## 2024-11-15 VITALS
SYSTOLIC BLOOD PRESSURE: 110 MMHG | RESPIRATION RATE: 16 BRPM | TEMPERATURE: 98.1 F | HEART RATE: 82 BPM | WEIGHT: 163 LBS | BODY MASS INDEX: 26.31 KG/M2 | OXYGEN SATURATION: 99 % | DIASTOLIC BLOOD PRESSURE: 68 MMHG

## 2024-11-15 DIAGNOSIS — G89.29 CHRONIC RIGHT-SIDED LOW BACK PAIN WITH RIGHT-SIDED SCIATICA: Primary | ICD-10-CM

## 2024-11-15 DIAGNOSIS — E11.69 TYPE 2 DIABETES MELLITUS WITH HYPERLIPIDEMIA (HCC): ICD-10-CM

## 2024-11-15 DIAGNOSIS — Z23 NEED FOR SHINGLES VACCINE: ICD-10-CM

## 2024-11-15 DIAGNOSIS — M54.41 CHRONIC RIGHT-SIDED LOW BACK PAIN WITH RIGHT-SIDED SCIATICA: Primary | ICD-10-CM

## 2024-11-15 DIAGNOSIS — Z87.891 HISTORY OF SMOKING 30 OR MORE PACK YEARS: ICD-10-CM

## 2024-11-15 DIAGNOSIS — E78.5 TYPE 2 DIABETES MELLITUS WITH HYPERLIPIDEMIA (HCC): ICD-10-CM

## 2024-11-15 DIAGNOSIS — I70.0 THORACIC AORTA ATHEROSCLEROSIS (HCC): ICD-10-CM

## 2024-11-15 DIAGNOSIS — Z29.11 NEED FOR RSV VACCINATION: ICD-10-CM

## 2024-11-15 RX ORDER — MELOXICAM 7.5 MG/1
7.5 TABLET ORAL DAILY PRN
Qty: 30 TABLET | Refills: 5 | Status: SHIPPED | OUTPATIENT
Start: 2024-11-15

## 2024-11-15 RX ORDER — ZOSTER VACCINE RECOMBINANT, ADJUVANTED 50 MCG/0.5
0.5 KIT INTRAMUSCULAR SEE ADMIN INSTRUCTIONS
Qty: 0.5 ML | Refills: 0 | Status: SHIPPED | OUTPATIENT
Start: 2024-11-15 | End: 2024-11-16

## 2024-11-15 SDOH — ECONOMIC STABILITY: FOOD INSECURITY: WITHIN THE PAST 12 MONTHS, THE FOOD YOU BOUGHT JUST DIDN'T LAST AND YOU DIDN'T HAVE MONEY TO GET MORE.: NEVER TRUE

## 2024-11-15 SDOH — ECONOMIC STABILITY: FOOD INSECURITY: WITHIN THE PAST 12 MONTHS, YOU WORRIED THAT YOUR FOOD WOULD RUN OUT BEFORE YOU GOT MONEY TO BUY MORE.: NEVER TRUE

## 2024-11-15 SDOH — ECONOMIC STABILITY: INCOME INSECURITY: HOW HARD IS IT FOR YOU TO PAY FOR THE VERY BASICS LIKE FOOD, HOUSING, MEDICAL CARE, AND HEATING?: NOT VERY HARD

## 2024-11-15 ASSESSMENT — PATIENT HEALTH QUESTIONNAIRE - PHQ9
2. FEELING DOWN, DEPRESSED OR HOPELESS: NOT AT ALL
SUM OF ALL RESPONSES TO PHQ9 QUESTIONS 1 & 2: 0
SUM OF ALL RESPONSES TO PHQ QUESTIONS 1-9: 0
1. LITTLE INTEREST OR PLEASURE IN DOING THINGS: NOT AT ALL
SUM OF ALL RESPONSES TO PHQ QUESTIONS 1-9: 0

## 2024-11-15 NOTE — PROGRESS NOTES
Chief Complaint   Patient presents with    Diabetes     Follow up  DM       Assessment/Plan:  Aracelis was seen today for diabetes.    Diagnoses and all orders for this visit:    Chronic right-sided low back pain with right-sided sciatica  -     XR LUMBAR SPINE (2-3 VIEWS); Future  -     meloxicam (MOBIC) 7.5 MG tablet; Take 1 tablet by mouth daily as needed for Pain    Thoracic aorta atherosclerosis (HCC)  Comments:  Chronic, stable.    History of smoking 30 or more pack years  -     [employer] Medication Mgmt (Smoking Cessation - Clinical Pharmacy) - [facility]    Type 2 diabetes mellitus with hyperlipidemia (HCC)  -      DIABETES FOOT EXAM    Need for RSV vaccination  -     respiratory syncytial vaccine, adjuvanted (AREXVY) 120 MCG/0.5ML injection; Inject 0.5 mLs into the muscle once for 1 dose    Need for shingles vaccine  -     zoster recombinant adjuvanted vaccine (SHINGRIX) 50 MCG/0.5ML SUSR injection; Inject 0.5 mLs into the muscle See Admin Instructions for 1 day        Vitals:    11/15/24 1034   BP: 110/68   Pulse: 82   Resp: 16   Temp: 98.1 °F (36.7 °C)   SpO2: 99%       Physical Exam  Vitals and nursing note reviewed.   Constitutional:       General: She is not in acute distress.     Appearance: She is well-developed.   Cardiovascular:      Rate and Rhythm: Normal rate and regular rhythm.      Heart sounds: Normal heart sounds. No murmur heard.     No friction rub. No gallop.   Pulmonary:      Effort: Pulmonary effort is normal. No respiratory distress.      Breath sounds: Normal breath sounds. No wheezing or rales.   Chest:      Chest wall: No tenderness.   Musculoskeletal:      Comments: No calluses or open lesions   Skin:     General: Skin is warm.      Findings: No erythema or rash.   Neurological:      Mental Status: She is alert and oriented to person, place, and time.      Comments: Normal sensation to vibration bilaterally.      Psychiatric:         Behavior: Behavior normal.

## 2024-11-19 ENCOUNTER — HOSPITAL ENCOUNTER (OUTPATIENT)
Age: 74
Discharge: HOME | End: 2024-11-19
Payer: MEDICARE

## 2024-11-19 ENCOUNTER — HOSPITAL ENCOUNTER (OUTPATIENT)
Dept: GENERAL RADIOLOGY | Age: 74
Discharge: HOME | End: 2024-11-19
Payer: MEDICARE

## 2024-11-19 ENCOUNTER — TELEPHONE (OUTPATIENT)
Dept: INTERNAL MEDICINE CLINIC | Age: 74
End: 2024-11-19

## 2024-11-19 DIAGNOSIS — M54.41 CHRONIC RIGHT-SIDED LOW BACK PAIN WITH RIGHT-SIDED SCIATICA: ICD-10-CM

## 2024-11-19 DIAGNOSIS — G89.29 CHRONIC RIGHT-SIDED LOW BACK PAIN WITH RIGHT-SIDED SCIATICA: ICD-10-CM

## 2024-11-19 PROCEDURE — 72100 X-RAY EXAM L-S SPINE 2/3 VWS: CPT

## 2024-11-19 NOTE — TELEPHONE ENCOUNTER
[facility] called in regards to the referral sent to them.  Should it have been sent to [facility]?  It was for smoking cessation which they don't do.     Please advise

## 2024-11-29 DIAGNOSIS — E78.5 TYPE 2 DIABETES MELLITUS WITH HYPERLIPIDEMIA (HCC): ICD-10-CM

## 2024-11-29 DIAGNOSIS — I15.2 HYPERTENSION ASSOCIATED WITH DIABETES (HCC): ICD-10-CM

## 2024-11-29 DIAGNOSIS — E11.69 TYPE 2 DIABETES MELLITUS WITH HYPERLIPIDEMIA (HCC): ICD-10-CM

## 2024-11-29 DIAGNOSIS — E11.59 HYPERTENSION ASSOCIATED WITH DIABETES (HCC): ICD-10-CM

## 2024-11-29 NOTE — TELEPHONE ENCOUNTER
Recent Visits  Date Type Provider Dept   11/15/24 Office Visit Chas Glover MD [facility]   05/13/24 Office Visit Chas Glover MD [facility]   11/10/23 Office Visit Chas Glover MD [facility]   Showing recent visits within past 540 days with a meds authorizing provider and meeting all other requirements  Future Appointments  No visits were found meeting these conditions.  Showing future appointments within next 150 days with a meds authorizing provider and meeting all other requirements     11/15/2024

## 2025-01-22 DIAGNOSIS — I15.2 HYPERTENSION ASSOCIATED WITH DIABETES (HCC): ICD-10-CM

## 2025-01-22 DIAGNOSIS — E78.5 TYPE 2 DIABETES MELLITUS WITH HYPERLIPIDEMIA (HCC): ICD-10-CM

## 2025-01-22 DIAGNOSIS — E11.69 TYPE 2 DIABETES MELLITUS WITH HYPERLIPIDEMIA (HCC): ICD-10-CM

## 2025-01-22 DIAGNOSIS — E11.59 HYPERTENSION ASSOCIATED WITH DIABETES (HCC): ICD-10-CM

## 2025-01-22 RX ORDER — LOSARTAN POTASSIUM 100 MG/1
TABLET ORAL
Qty: 90 TABLET | Refills: 1 | Status: SHIPPED | OUTPATIENT
Start: 2025-01-22

## 2025-01-22 NOTE — TELEPHONE ENCOUNTER
Recent Visits  Date Type Provider Dept   11/15/24 Office Visit Chas Glover MD [facility]   05/13/24 Office Visit Chas Glover MD [facility]   11/10/23 Office Visit Chas Glover MD [facility]   Showing recent visits within past 540 days with a meds authorizing provider and meeting all other requirements  Future Appointments  Date Type Provider Dept   05/16/25 Appointment Chas Glover MD [facility]   Showing future appointments within next 150 days with a meds authorizing provider and meeting all other requirements     11/15/2024

## 2025-01-29 ENCOUNTER — OFFICE VISIT (OUTPATIENT)
Dept: INTERNAL MEDICINE CLINIC | Age: 75
End: 2025-01-29
Payer: MEDICARE

## 2025-01-29 VITALS — SYSTOLIC BLOOD PRESSURE: 120 MMHG | DIASTOLIC BLOOD PRESSURE: 64 MMHG | BODY MASS INDEX: 26.31 KG/M2 | WEIGHT: 163 LBS

## 2025-01-29 DIAGNOSIS — I15.2 HYPERTENSION ASSOCIATED WITH DIABETES (HCC): ICD-10-CM

## 2025-01-29 DIAGNOSIS — E78.5 TYPE 2 DIABETES MELLITUS WITH HYPERLIPIDEMIA (HCC): Primary | ICD-10-CM

## 2025-01-29 DIAGNOSIS — E11.69 TYPE 2 DIABETES MELLITUS WITH HYPERLIPIDEMIA (HCC): Primary | ICD-10-CM

## 2025-01-29 DIAGNOSIS — M54.50 CHRONIC RIGHT-SIDED LOW BACK PAIN WITHOUT SCIATICA: Primary | ICD-10-CM

## 2025-01-29 DIAGNOSIS — G89.29 CHRONIC RIGHT-SIDED LOW BACK PAIN WITHOUT SCIATICA: Primary | ICD-10-CM

## 2025-01-29 DIAGNOSIS — E11.59 HYPERTENSION ASSOCIATED WITH DIABETES (HCC): ICD-10-CM

## 2025-01-29 LAB — HBA1C MFR BLD: 7.6 %

## 2025-01-29 PROCEDURE — 83036 HEMOGLOBIN GLYCOSYLATED A1C: CPT

## 2025-01-29 PROCEDURE — 99999 PR OFFICE/OUTPT VISIT,PROCEDURE ONLY: CPT

## 2025-01-29 RX ORDER — DULAGLUTIDE 3 MG/.5ML
3 INJECTION, SOLUTION SUBCUTANEOUS WEEKLY
Qty: 2 ML | Refills: 0
Start: 2025-01-29

## 2025-01-29 NOTE — PROGRESS NOTES
CLINICAL PHARMACY NOTE--Diabetes    Aracelis Malone is a 74 y.o. female with PMHX significant for CAD, HTN and Type 2 Diabetes referred by Dr FRANCESCA Glover for diabetes counseling and medication management.     Interval update:  LibreView shows good control Avg 154, POC A1c 7.6% today. Phone not compatible with libre3 yet. Received 4 month supply trulicity 1.5 mg. C/o sciatica. No desire to quit smoking now.    ASSESSMENT/PLAN   Diagnosis Orders   1. Type 2 diabetes mellitus with hyperlipidemia (HCC)  POCT glycosylated hemoglobin (Hb A1C)    Dulaglutide (TRULICITY) 3 MG/0.5ML SOAJ    -trial incr trulicity to 3 mg- call if tolerating so we can submit refill  -[email or web address] 2025 approved  -cont metformin      2. Hypertension associated with diabetes (HCC) Controlled     -goal <130/80  -cont current      -refer to PT for back pain.    Follow up: 3 month      Health Maintenance Due   Topic Date Due    Shingles vaccine (1 of 2) Never done    Respiratory Syncytial Virus (RSV) Pregnant or age 60 yrs+ (1 - Risk 60-74 years 1-dose series) Never done    DTaP/Tdap/Td vaccine (2 - Td or Tdap) 04/09/2023    COVID-19 Vaccine (4 - 2023-24 season) 09/01/2024       ---------------------------------------------------------------------------------------------------------------------------------------      SUBJECTIVE/OBJECTIVE  Treatment Adherence:  Diet: 1-2 meals per day (12p, 7p), snacks on a lot of fruit. Decrease in appetite since starting trulicity  Exercise: no regular exercise  Weight trend: decreasing past month  Barriers: cost of medication (has pt assistance), strips $178 so using jak instead  -declined [facility] dietitian     Type 2 Diabetes Mellitus  Comorbities:  ASCVD  Considerations: minimize hypoglycemia, cost of medication  Current symptoms/problems include none.  Episodes of hypoglycemia?  None since d/c of insulin  Eye exam current (within one year): unknown  Tobacco history: She  reports that she has been smoking

## 2025-02-28 DIAGNOSIS — I70.0 THORACIC AORTA ATHEROSCLEROSIS: ICD-10-CM

## 2025-02-28 DIAGNOSIS — E11.69 TYPE 2 DIABETES MELLITUS WITH HYPERLIPIDEMIA (HCC): ICD-10-CM

## 2025-02-28 DIAGNOSIS — E78.5 TYPE 2 DIABETES MELLITUS WITH HYPERLIPIDEMIA (HCC): ICD-10-CM

## 2025-02-28 RX ORDER — EZETIMIBE 10 MG/1
10 TABLET ORAL DAILY
Qty: 90 TABLET | Refills: 1 | Status: SHIPPED | OUTPATIENT
Start: 2025-02-28

## 2025-02-28 NOTE — TELEPHONE ENCOUNTER
Recent Visits  Date Type Provider Dept   11/15/24 Office Visit Chas Glover MD [facility]   05/13/24 Office Visit Chas Glover MD [facility]   11/10/23 Office Visit Chas Glover MD [facility]   Showing recent visits within past 540 days with a meds authorizing provider and meeting all other requirements  Future Appointments  Date Type Provider Dept   05/16/25 Appointment Chas Glover MD [facility]   Showing future appointments within next 150 days with a meds authorizing provider and meeting all other requirements     1/29/2025

## 2025-03-13 ENCOUNTER — TELEPHONE (OUTPATIENT)
Dept: PHARMACY | Age: 75
End: 2025-03-13

## 2025-03-13 DIAGNOSIS — E11.69 TYPE 2 DIABETES MELLITUS WITH HYPERLIPIDEMIA (HCC): Primary | ICD-10-CM

## 2025-03-13 DIAGNOSIS — E78.5 TYPE 2 DIABETES MELLITUS WITH HYPERLIPIDEMIA (HCC): Primary | ICD-10-CM

## 2025-03-13 RX ORDER — DULAGLUTIDE 3 MG/.5ML
3 INJECTION, SOLUTION SUBCUTANEOUS WEEKLY
Qty: 6 ML | Refills: 3 | Status: SHIPPED | OUTPATIENT
Start: 2025-03-13

## 2025-03-13 NOTE — TELEPHONE ENCOUNTER
Pt now tolerates trulicity 3 mg and needs refill.     For Pharmacy Admin Tracking Only    Program: Medical Group  CPA in place:  Yes  Recommendation Provided To: Patient/Caregiver: 1 via Telephone  Intervention Detail: Dose Adjustment: 1, reason: Therapy Optimization  Intervention Accepted By: Patient/Caregiver: 1  Gap Closed?: Yes   Time Spent (min): 5

## 2025-03-26 RX ORDER — ATORVASTATIN CALCIUM 40 MG/1
40 TABLET, FILM COATED ORAL DAILY
Qty: 90 TABLET | Refills: 1 | Status: SHIPPED | OUTPATIENT
Start: 2025-03-26

## 2025-03-26 RX ORDER — HYDROCHLOROTHIAZIDE 12.5 MG/1
12.5 CAPSULE ORAL EVERY MORNING
Qty: 90 CAPSULE | Refills: 1 | Status: SHIPPED | OUTPATIENT
Start: 2025-03-26

## 2025-03-26 NOTE — TELEPHONE ENCOUNTER
Recent Visits  Date Type Provider Dept   11/15/24 Office Visit Chas Glover MD [facility]   05/13/24 Office Visit Chas Glover MD [facility]   11/10/23 Office Visit Chas Glover MD [facility]   Showing recent visits within past 540 days with a meds authorizing provider and meeting all other requirements  Future Appointments  Date Type Provider Dept   05/16/25 Appointment Chas Glover MD [facility]   Showing future appointments within next 150 days with a meds authorizing provider and meeting all other requirements     1/29/2025

## 2025-04-07 DIAGNOSIS — F51.01 PRIMARY INSOMNIA: ICD-10-CM

## 2025-04-07 NOTE — TELEPHONE ENCOUNTER
Recent Visits  Date Type Provider Dept   11/15/24 Office Visit Chas Glover MD [facility]   05/13/24 Office Visit Chas Glover MD [facility]   11/10/23 Office Visit Chas Glover MD [facility]   Showing recent visits within past 540 days with a meds authorizing provider and meeting all other requirements  Future Appointments  Date Type Provider Dept   05/16/25 Appointment Chas Glover MD [facility]   Showing future appointments within next 150 days with a meds authorizing provider and meeting all other requirements     1/29/2025

## 2025-04-08 RX ORDER — ZOLPIDEM TARTRATE 10 MG/1
TABLET ORAL
Qty: 90 TABLET | Refills: 0 | Status: SHIPPED | OUTPATIENT
Start: 2025-04-08 | End: 2025-07-07

## 2025-05-07 ENCOUNTER — OFFICE VISIT (OUTPATIENT)
Dept: INTERNAL MEDICINE CLINIC | Age: 75
End: 2025-05-07

## 2025-05-07 VITALS — BODY MASS INDEX: 25.6 KG/M2 | SYSTOLIC BLOOD PRESSURE: 110 MMHG | DIASTOLIC BLOOD PRESSURE: 64 MMHG | WEIGHT: 158.6 LBS

## 2025-05-07 DIAGNOSIS — E78.5 TYPE 2 DIABETES MELLITUS WITH HYPERLIPIDEMIA (HCC): Primary | ICD-10-CM

## 2025-05-07 DIAGNOSIS — E11.69 TYPE 2 DIABETES MELLITUS WITH HYPERLIPIDEMIA (HCC): Primary | ICD-10-CM

## 2025-05-07 DIAGNOSIS — M85.80 OSTEOPENIA, UNSPECIFIED LOCATION: ICD-10-CM

## 2025-05-07 DIAGNOSIS — I15.2 HYPERTENSION ASSOCIATED WITH DIABETES (HCC): ICD-10-CM

## 2025-05-07 DIAGNOSIS — E11.59 HYPERTENSION ASSOCIATED WITH DIABETES (HCC): ICD-10-CM

## 2025-05-07 DIAGNOSIS — E78.5 HYPERLIPIDEMIA, UNSPECIFIED HYPERLIPIDEMIA TYPE: ICD-10-CM

## 2025-05-07 NOTE — PROGRESS NOTES
Pertinent Labs:  Lab Results   Component Value Date    LABA1C 7.6 01/29/2025    LABA1C 7.4 10/23/2024    LABA1C 7.0 07/17/2024      No results found for: \"MALBCR\"    No results found for: \"CRCLEARANCE\"   Lab Results   Component Value Date    CHOL 137 05/07/2024    TRIG 57 05/07/2024    HDL 74 (H) 05/07/2024     Lab Results   Component Value Date    CREATININE 0.9 05/07/2024    BUN 26 (H) 05/07/2024     (L) 05/07/2024    K 4.7 05/07/2024     05/07/2024    CO2 19 (L) 05/07/2024     Lab Results   Component Value Date    AST 23 11/10/2023    ALT 19 11/10/2023    BILITOT 0.7 11/10/2023    ALKPHOS 95 11/10/2023     No components found for: \"VITB12\"  Lab Results   Component Value Date    TSH 0.83 05/10/2023        Medication list reviewed and updated.     The 10-year ASCVD risk score (Enoch FLORIAN, et al., 2019) is: 36.8%    Values used to calculate the score:      Age: 75 years      Sex: Female      Is Non- : Yes      Diabetic: Yes      Tobacco smoker: Yes      Systolic Blood Pressure: 110 mmHg      Is BP treated: Yes      HDL Cholesterol: 74 mg/dL      Total Cholesterol: 137 mg/dL    Thea Emerson, PharmD, BCACP  Medication Management Clinic   [facility] [place] Ph: [phone]  [facility] [place] Ph: [phone]  5/7/2025 3:10 PM    For Pharmacy Admin Tracking Only    Program: Medical Group  CPA in place:  Yes  Recommendation Provided To: Patient/Caregiver: 2 via In person  Intervention Detail: Discontinued Rx: 1, reason: Therapy De-escalation and Lab(s) Ordered  Intervention Accepted By: Patient/Caregiver: 2  Gap Closed?: No   Time Spent (min): 45

## 2025-05-12 DIAGNOSIS — E11.69 TYPE 2 DIABETES MELLITUS WITH HYPERLIPIDEMIA (HCC): ICD-10-CM

## 2025-05-12 DIAGNOSIS — M85.80 OSTEOPENIA, UNSPECIFIED LOCATION: ICD-10-CM

## 2025-05-12 DIAGNOSIS — E78.5 TYPE 2 DIABETES MELLITUS WITH HYPERLIPIDEMIA (HCC): ICD-10-CM

## 2025-05-12 DIAGNOSIS — E78.5 HYPERLIPIDEMIA, UNSPECIFIED HYPERLIPIDEMIA TYPE: ICD-10-CM

## 2025-05-13 ENCOUNTER — RESULTS FOLLOW-UP (OUTPATIENT)
Dept: INTERNAL MEDICINE CLINIC | Age: 75
End: 2025-05-13

## 2025-05-13 DIAGNOSIS — E55.9 VITAMIN D DEFICIENCY: Primary | ICD-10-CM

## 2025-05-13 LAB
25(OH)D3 SERPL-MCNC: 12.2 NG/ML
ALBUMIN SERPL-MCNC: 4.6 G/DL (ref 3.4–5)
ALBUMIN/GLOB SERPL: 2 {RATIO} (ref 1.1–2.2)
ALP SERPL-CCNC: 84 U/L (ref 40–129)
ALT SERPL-CCNC: 23 U/L (ref 10–40)
ANION GAP SERPL CALCULATED.3IONS-SCNC: 11 MMOL/L (ref 3–16)
AST SERPL-CCNC: 25 U/L (ref 15–37)
BASOPHILS # BLD: 0.1 K/UL (ref 0–0.2)
BASOPHILS NFR BLD: 1.7 %
BILIRUB SERPL-MCNC: 0.8 MG/DL (ref 0–1)
BUN SERPL-MCNC: 21 MG/DL (ref 7–20)
CALCIUM SERPL-MCNC: 9.6 MG/DL (ref 8.3–10.6)
CHLORIDE SERPL-SCNC: 104 MMOL/L (ref 99–110)
CHOLEST SERPL-MCNC: 144 MG/DL (ref 0–199)
CO2 SERPL-SCNC: 24 MMOL/L (ref 21–32)
CREAT SERPL-MCNC: 0.8 MG/DL (ref 0.6–1.2)
CREAT UR-MCNC: 149 MG/DL (ref 28–259)
DEPRECATED RDW RBC AUTO: 16.3 % (ref 12.4–15.4)
EOSINOPHIL # BLD: 0.5 K/UL (ref 0–0.6)
EOSINOPHIL NFR BLD: 6.2 %
EST. AVERAGE GLUCOSE BLD GHB EST-MCNC: 162.8 MG/DL
GFR SERPLBLD CREATININE-BSD FMLA CKD-EPI: 77 ML/MIN/{1.73_M2}
GLUCOSE SERPL-MCNC: 139 MG/DL (ref 70–99)
HBA1C MFR BLD: 7.3 %
HCT VFR BLD AUTO: 35.2 % (ref 36–48)
HDLC SERPL-MCNC: 78 MG/DL (ref 40–60)
HGB BLD-MCNC: 11.4 G/DL (ref 12–16)
LDLC SERPL CALC-MCNC: 53 MG/DL
LYMPHOCYTES # BLD: 1.9 K/UL (ref 1–5.1)
LYMPHOCYTES NFR BLD: 23.7 %
MCH RBC QN AUTO: 25.1 PG (ref 26–34)
MCHC RBC AUTO-ENTMCNC: 32.3 G/DL (ref 31–36)
MCV RBC AUTO: 77.8 FL (ref 80–100)
MICROALBUMIN UR DL<=1MG/L-MCNC: <1.2 MG/DL
MICROALBUMIN/CREAT UR: NORMAL MG/G (ref 0–30)
MONOCYTES # BLD: 0.4 K/UL (ref 0–1.3)
MONOCYTES NFR BLD: 5.3 %
NEUTROPHILS # BLD: 5 K/UL (ref 1.7–7.7)
NEUTROPHILS NFR BLD: 63.1 %
PLATELET # BLD AUTO: 206 K/UL (ref 135–450)
PMV BLD AUTO: 11.1 FL (ref 5–10.5)
POTASSIUM SERPL-SCNC: 4.4 MMOL/L (ref 3.5–5.1)
PROT SERPL-MCNC: 6.9 G/DL (ref 6.4–8.2)
RBC # BLD AUTO: 4.53 M/UL (ref 4–5.2)
SODIUM SERPL-SCNC: 139 MMOL/L (ref 136–145)
TRIGL SERPL-MCNC: 65 MG/DL (ref 0–150)
VIT B12 SERPL-MCNC: 426 PG/ML (ref 211–911)
VLDLC SERPL CALC-MCNC: 13 MG/DL
WBC # BLD AUTO: 7.9 K/UL (ref 4–11)

## 2025-05-13 RX ORDER — ERGOCALCIFEROL 1.25 MG/1
50000 CAPSULE, LIQUID FILLED ORAL WEEKLY
Qty: 12 CAPSULE | Refills: 1 | Status: SHIPPED | OUTPATIENT
Start: 2025-05-13

## 2025-05-13 NOTE — TELEPHONE ENCOUNTER
Reviewed labs with pt. Will start vit d 50,000 once weekly. Pt verbalized understanding.     Thea Emerson, PharmD, BCACP  Medication Management Clinic   [facility] [place] Ph: [phone]  [facility] Ph: [phone]  5/13/2025 12:20 PM    For Pharmacy Admin Tracking Only    Program: Medical Group  CPA in place:  Yes  Recommendation Provided To: Patient/Caregiver: 1 via Telephone  Intervention Detail: New Rx: 1, reason: Needs Additional Therapy  Intervention Accepted By: Patient/Caregiver: 1  Gap Closed?: No   Time Spent (min): 10

## 2025-05-15 SDOH — ECONOMIC STABILITY: FOOD INSECURITY: WITHIN THE PAST 12 MONTHS, THE FOOD YOU BOUGHT JUST DIDN'T LAST AND YOU DIDN'T HAVE MONEY TO GET MORE.: NEVER TRUE

## 2025-05-15 SDOH — ECONOMIC STABILITY: INCOME INSECURITY: IN THE LAST 12 MONTHS, WAS THERE A TIME WHEN YOU WERE NOT ABLE TO PAY THE MORTGAGE OR RENT ON TIME?: NO

## 2025-05-15 SDOH — ECONOMIC STABILITY: TRANSPORTATION INSECURITY
IN THE PAST 12 MONTHS, HAS LACK OF TRANSPORTATION KEPT YOU FROM MEETINGS, WORK, OR FROM GETTING THINGS NEEDED FOR DAILY LIVING?: NO

## 2025-05-15 SDOH — HEALTH STABILITY: PHYSICAL HEALTH: ON AVERAGE, HOW MANY MINUTES DO YOU ENGAGE IN EXERCISE AT THIS LEVEL?: 60 MIN

## 2025-05-15 SDOH — ECONOMIC STABILITY: FOOD INSECURITY: WITHIN THE PAST 12 MONTHS, YOU WORRIED THAT YOUR FOOD WOULD RUN OUT BEFORE YOU GOT MONEY TO BUY MORE.: NEVER TRUE

## 2025-05-15 SDOH — HEALTH STABILITY: PHYSICAL HEALTH: ON AVERAGE, HOW MANY DAYS PER WEEK DO YOU ENGAGE IN MODERATE TO STRENUOUS EXERCISE (LIKE A BRISK WALK)?: 2 DAYS

## 2025-05-15 ASSESSMENT — LIFESTYLE VARIABLES
HOW OFTEN DO YOU HAVE A DRINK CONTAINING ALCOHOL: 2
HOW OFTEN DO YOU HAVE A DRINK CONTAINING ALCOHOL: MONTHLY OR LESS
HOW MANY STANDARD DRINKS CONTAINING ALCOHOL DO YOU HAVE ON A TYPICAL DAY: 1
HOW OFTEN DO YOU HAVE SIX OR MORE DRINKS ON ONE OCCASION: 1
HOW MANY STANDARD DRINKS CONTAINING ALCOHOL DO YOU HAVE ON A TYPICAL DAY: 1 OR 2

## 2025-05-15 ASSESSMENT — PATIENT HEALTH QUESTIONNAIRE - PHQ9
SUM OF ALL RESPONSES TO PHQ QUESTIONS 1-9: 0
2. FEELING DOWN, DEPRESSED OR HOPELESS: NOT AT ALL
SUM OF ALL RESPONSES TO PHQ QUESTIONS 1-9: 0
SUM OF ALL RESPONSES TO PHQ QUESTIONS 1-9: 0
1. LITTLE INTEREST OR PLEASURE IN DOING THINGS: NOT AT ALL
SUM OF ALL RESPONSES TO PHQ QUESTIONS 1-9: 0

## 2025-05-16 ENCOUNTER — OFFICE VISIT (OUTPATIENT)
Dept: INTERNAL MEDICINE CLINIC | Age: 75
End: 2025-05-16

## 2025-05-16 VITALS
OXYGEN SATURATION: 99 % | HEART RATE: 81 BPM | BODY MASS INDEX: 25.43 KG/M2 | DIASTOLIC BLOOD PRESSURE: 64 MMHG | WEIGHT: 158.2 LBS | SYSTOLIC BLOOD PRESSURE: 108 MMHG | HEIGHT: 66 IN

## 2025-05-16 DIAGNOSIS — Z00.00 MEDICARE ANNUAL WELLNESS VISIT, SUBSEQUENT: Primary | ICD-10-CM

## 2025-05-16 DIAGNOSIS — E11.69 TYPE 2 DIABETES MELLITUS WITH HYPERLIPIDEMIA (HCC): ICD-10-CM

## 2025-05-16 DIAGNOSIS — R91.1 INCIDENTAL PULMONARY NODULE, GREATER THAN OR EQUAL TO 8MM: ICD-10-CM

## 2025-05-16 DIAGNOSIS — E78.5 TYPE 2 DIABETES MELLITUS WITH HYPERLIPIDEMIA (HCC): ICD-10-CM

## 2025-05-16 DIAGNOSIS — J43.2 CENTRILOBULAR EMPHYSEMA (HCC): ICD-10-CM

## 2025-05-16 NOTE — PROGRESS NOTES
Medicare Annual Wellness Visit    Aracelis Malone is here for Medicare AWV (AWV/)    Assessment & Plan   Medicare annual wellness visit, subsequent  Centrilobular emphysema (HCC)  -     [facility]  Type 2 diabetes mellitus with hyperlipidemia (HCC)  Comments:  Chronic, controlled.  Incidental pulmonary nodule, greater than or equal to 8mm  -     [facility]       Return in about 6 months (around 11/16/2025) for DM, copd, pulmonary nodule.     Subjective   The following acute and/or chronic problems were also addressed today:  Diabetes, COPD    Patient's complete Health Risk Assessment and screening values have been reviewed and are found in Flowsheets. The following problems were reviewed today and where indicated follow up appointments were made and/or referrals ordered.    Positive Risk Factor Screenings with Interventions:              Inactivity:  On average, how many days per week do you engage in moderate to strenuous exercise (like a brisk walk)?: (Patient-Rptd) 2 days (!) Abnormal  On average, how many minutes do you engage in exercise at this level?: (Patient-Rptd) 60 min  Interventions:  See AVS for additional education material        Vision Screen:  Do you have difficulty driving, watching TV, or doing any of your daily activities because of your eyesight?: (Patient-Rptd) No  Have you had an eye exam within the past year?: (!) (Patient-Rptd) No  Interventions:   Patient encouraged to make appointment with their eye specialist      Advanced Directives:  Do you have a Living Will?: (!) (Patient-Rptd) No    Intervention:  has NO advanced directive - information provided        Tobacco Use:    Tobacco Use      Smoking status: Every Day        Packs/day: 0.75        Years: 0.8 packs/day for 50.0 years (37.5 ttl pk-yrs)        Types: Cigarettes      Smokeless tobacco: Never     Interventions:  Patient advised to follow

## 2025-05-16 NOTE — PATIENT INSTRUCTIONS
Learning About Being Active as an Older Adult  Why is being active important as you get older?     Being active is one of the best things you can do for your health. And it's never too late to start. Being active--or getting active, if you aren't already--has definite benefits. It can:  Give you more energy,  Keep your mind sharp.  Improve balance to reduce your risk of falls.  Help you manage chronic illness with fewer medicines.  No matter how old you are, how fit you are, or what health problems you have, there is a form of activity that will work for you. And the more physical activity you can do, the better your overall health will be.  What kinds of activity can help you stay healthy?  Being more active will make your daily activities easier. Physical activity includes planned exercise and things you do in daily life. There are four types of activity:  Aerobic.  Doing aerobic activity makes your heart and lungs strong.  Includes walking, dancing, and gardening.  Aim for at least 2½ hours spread throughout the week.  It improves your energy and can help you sleep better.  Muscle-strengthening.  This type of activity can help maintain muscle and strengthen bones.  Includes climbing stairs, using resistance bands, and lifting or carrying heavy loads.  Aim for at least twice a week.  It can help protect the knees and other joints.  Stretching.  Stretching gives you better range of motion in joints and muscles.  Includes upper arm stretches, calf stretches, and gentle yoga.  Aim for at least twice a week, preferably after your muscles are warmed up from other activities.  It can help you function better in daily life.  Balancing.  This helps you stay coordinated and have good posture.  Includes heel-to-toe walking, mara chi, and certain types of yoga.  Aim for at least 3 days a week.  It can reduce your risk of falling.  Even if you have a hard time meeting the recommendations, it's better to be more active

## 2025-05-21 DIAGNOSIS — M54.50 CHRONIC RIGHT-SIDED LOW BACK PAIN WITHOUT SCIATICA: ICD-10-CM

## 2025-05-21 DIAGNOSIS — G89.29 CHRONIC RIGHT-SIDED LOW BACK PAIN WITHOUT SCIATICA: ICD-10-CM

## 2025-05-21 RX ORDER — IBUPROFEN 600 MG/1
600 TABLET, FILM COATED ORAL EVERY 8 HOURS PRN
Qty: 90 TABLET | Refills: 1 | Status: SHIPPED | OUTPATIENT
Start: 2025-05-21

## 2025-05-21 NOTE — TELEPHONE ENCOUNTER
Recent Visits  Date Type Provider Dept   05/16/25 Office Visit Chas Glover MD [facility]   11/15/24 Office Visit [facility] Gl[facility] MD [facility]   05/13/24 Office Visit Chas Glover MD [facility]   Showing recent visits within past 540 days with a meds authorizing provider and meeting all other requirements  Future Appointments  No visits were found meeting these conditions.  Showing future appointments within next 150 days with a meds authorizing provider and meeting all other requirements     5/16/2025

## 2025-05-28 ENCOUNTER — TELEPHONE (OUTPATIENT)
Dept: CASE MANAGEMENT | Age: 75
End: 2025-05-28

## 2025-05-28 DIAGNOSIS — Z87.891 PERSONAL HISTORY OF TOBACCO USE, PRESENTING HAZARDS TO HEALTH: Primary | ICD-10-CM

## 2025-05-28 DIAGNOSIS — I15.2 HYPERTENSION ASSOCIATED WITH DIABETES (HCC): ICD-10-CM

## 2025-05-28 DIAGNOSIS — E78.5 TYPE 2 DIABETES MELLITUS WITH HYPERLIPIDEMIA (HCC): ICD-10-CM

## 2025-05-28 DIAGNOSIS — E11.59 HYPERTENSION ASSOCIATED WITH DIABETES (HCC): ICD-10-CM

## 2025-05-28 DIAGNOSIS — E11.69 TYPE 2 DIABETES MELLITUS WITH HYPERLIPIDEMIA (HCC): ICD-10-CM

## 2025-05-28 NOTE — TELEPHONE ENCOUNTER
Recent Visits  Date Type Provider Dept   05/16/25 Office Visit Chas Glover MD [facility]   11/15/24 Office Visit [facility]as Glover MD [facility]   05/13/24 Office Visit Chas Glover MD [facility]   Showing recent visits within past 540 days with a meds authorizing provider and meeting all other requirements  Future Appointments  No visits were found meeting these conditions.  Showing future appointments within next 150 days with a meds authorizing provider and meeting all other requirements     5/16/2025

## 2025-05-28 NOTE — TELEPHONE ENCOUNTER
Chas Logan Dr., MD,    This is a friendly reminder that your patient is due for their annual lung screen on 6/18/25.  For your convenience, I have pended the order for the scan.  If you do not agree with the need for the test, please cancel the order and let me know.      Patient will be mailed reminder letter to schedule.      Thank you,     Samaria La  Lung Navigator  [facility]  [phone]    Future Appointments   Date Time Provider Department Center   6/3/2025 10:15 AM Lc Rodriguez MD [facility]   8/27/2025  3:00 PM Amber Emerson [facility] DEP   11/17/2025 10:00 AM Chas Glover MD [facility]       Last PCP Appt: 5/16/25     Lung Screen Criteria  Age 50-80  Current smoker or quit within the last 15 years  Has => 20 pack year history.

## 2025-06-03 ENCOUNTER — OFFICE VISIT (OUTPATIENT)
Dept: PULMONOLOGY | Age: 75
End: 2025-06-03
Payer: MEDICARE

## 2025-06-03 VITALS
WEIGHT: 160.4 LBS | HEART RATE: 81 BPM | OXYGEN SATURATION: 100 % | BODY MASS INDEX: 25.78 KG/M2 | DIASTOLIC BLOOD PRESSURE: 70 MMHG | HEIGHT: 66 IN | SYSTOLIC BLOOD PRESSURE: 122 MMHG

## 2025-06-03 DIAGNOSIS — F17.200 CURRENT SMOKER: ICD-10-CM

## 2025-06-03 DIAGNOSIS — J43.2 CENTRILOBULAR EMPHYSEMA (HCC): Primary | ICD-10-CM

## 2025-06-03 DIAGNOSIS — J43.2 CENTRILOBULAR EMPHYSEMA (HCC): ICD-10-CM

## 2025-06-03 DIAGNOSIS — R91.1 PULMONARY NODULE: Primary | ICD-10-CM

## 2025-06-03 PROCEDURE — G8399 PT W/DXA RESULTS DOCUMENT: HCPCS | Performed by: INTERNAL MEDICINE

## 2025-06-03 PROCEDURE — 99204 OFFICE O/P NEW MOD 45 MIN: CPT | Performed by: INTERNAL MEDICINE

## 2025-06-03 PROCEDURE — 1123F ACP DISCUSS/DSCN MKR DOCD: CPT | Performed by: INTERNAL MEDICINE

## 2025-06-03 PROCEDURE — 4004F PT TOBACCO SCREEN RCVD TLK: CPT | Performed by: INTERNAL MEDICINE

## 2025-06-03 PROCEDURE — 3074F SYST BP LT 130 MM HG: CPT | Performed by: INTERNAL MEDICINE

## 2025-06-03 PROCEDURE — G8417 CALC BMI ABV UP PARAM F/U: HCPCS | Performed by: INTERNAL MEDICINE

## 2025-06-03 PROCEDURE — 1159F MED LIST DOCD IN RCRD: CPT | Performed by: INTERNAL MEDICINE

## 2025-06-03 PROCEDURE — 3023F SPIROM DOC REV: CPT | Performed by: INTERNAL MEDICINE

## 2025-06-03 PROCEDURE — 3078F DIAST BP <80 MM HG: CPT | Performed by: INTERNAL MEDICINE

## 2025-06-03 PROCEDURE — 1090F PRES/ABSN URINE INCON ASSESS: CPT | Performed by: INTERNAL MEDICINE

## 2025-06-03 PROCEDURE — 1160F RVW MEDS BY RX/DR IN RCRD: CPT | Performed by: INTERNAL MEDICINE

## 2025-06-03 PROCEDURE — 3017F COLORECTAL CA SCREEN DOC REV: CPT | Performed by: INTERNAL MEDICINE

## 2025-06-03 PROCEDURE — G8427 DOCREV CUR MEDS BY ELIG CLIN: HCPCS | Performed by: INTERNAL MEDICINE

## 2025-06-03 ASSESSMENT — ENCOUNTER SYMPTOMS
DIARRHEA: 0
CONSTIPATION: 0
ABDOMINAL PAIN: 0
SINUS PRESSURE: 0
NAUSEA: 0

## 2025-06-03 NOTE — PROGRESS NOTES
[facility]  Consult Note  Lc Rodriguez MD       Aracelis Malone   YOB: 1950    Date of Visit:  6/3/2025    Assessment/Plan:  1. Pulmonary nodule      I reviewed CT imaging which does reveal evidence of a 9 mm left pulmonary nodule.  This is partially calcified and similar to imaging from 2023.    Plan:  Repeat LDCT chest as a screening study.  This should be done at the end of June or the beginning of July.    2. Centrilobular emphysema (HCC)  PFT 7/10/2018:    I reviewed pulmonary function testing from 2018.  Spirometry and lung volumes are normal with moderate reduction in diffusion capacity.  This could be on the basis of early emphysema.  CT imaging also does reveal some evidence of apical predominant centrilobular emphysema.  This is mild.    Plan:  She does not feel like she needs inhalers at the present time  Repeat PFT    3. Current smoker  Spent 3-10 minutes counseling patient on smoking cessation, discussing strategies and resources to support the patient in quitting.  She smoking about 10 cigarettes/day  Discussed strategies to help her with smoking cessation including oral medication such as Chantix and Wellbutrin versus nicotine replacement.  She plans to try cutting down on her own and then start using nicotine replacement in the form of the mini lozenges.  I described to her how to properly use this medication.    Follow-up in 3 months          Chief Complaint   Patient presents with    New Patient     Ref by Dr. Rosas - centrilobular emphysema       HPI  The patient is referred for evaluation of abnormal CT scan of the chest.  She does have a significant smoking history.  She smokes about 10 cigarettes/day since age 18.  She has been having screening CT scan of the chest on an annual basis.  This imaging has revealed a partially calcified 9 mm left pulmonary nodule.  She denies anorexia, weight loss or night sweats.  She is not

## 2025-06-24 ENCOUNTER — RESULTS FOLLOW-UP (OUTPATIENT)
Dept: INTERNAL MEDICINE CLINIC | Age: 75
End: 2025-06-24

## 2025-06-24 ENCOUNTER — HOSPITAL ENCOUNTER (OUTPATIENT)
Dept: CT IMAGING | Age: 75
Discharge: HOME | End: 2025-06-24
Attending: INTERNAL MEDICINE
Payer: MEDICARE

## 2025-06-24 ENCOUNTER — HOSPITAL ENCOUNTER (OUTPATIENT)
Dept: WOMENS IMAGING | Age: 75
Discharge: HOME | End: 2025-06-24
Attending: INTERNAL MEDICINE
Payer: MEDICARE

## 2025-06-24 ENCOUNTER — HOSPITAL ENCOUNTER (OUTPATIENT)
Dept: PULMONOLOGY | Age: 75
Discharge: HOME | End: 2025-06-24
Attending: INTERNAL MEDICINE
Payer: MEDICARE

## 2025-06-24 VITALS — HEIGHT: 66 IN | WEIGHT: 160 LBS | BODY MASS INDEX: 25.71 KG/M2

## 2025-06-24 DIAGNOSIS — F17.200 CURRENT SMOKER: ICD-10-CM

## 2025-06-24 DIAGNOSIS — J43.2 CENTRILOBULAR EMPHYSEMA (HCC): ICD-10-CM

## 2025-06-24 DIAGNOSIS — Z12.31 VISIT FOR SCREENING MAMMOGRAM: ICD-10-CM

## 2025-06-24 DIAGNOSIS — Z87.891 PERSONAL HISTORY OF TOBACCO USE, PRESENTING HAZARDS TO HEALTH: ICD-10-CM

## 2025-06-24 LAB
DLCO %PRED: 75 %
DLCO PRED: NORMAL
DLCO/VA %PRED: NORMAL
DLCO/VA PRED: NORMAL
DLCO/VA: NORMAL
DLCO: NORMAL
EXPIRATORY TIME-POST: NORMAL
EXPIRATORY TIME: NORMAL
FEF 25-75 %CHNG: NORMAL
FEF 25-75 POST %PRED: NORMAL
FEF 25-75% %PRED-PRE: NORMAL
FEF 25-75% PRED: NORMAL
FEF 25-75-POST: NORMAL
FEF 25-75-PRE: NORMAL
FEV1 %PRED-POST: 80 %
FEV1 %PRED-PRE: 77 %
FEV1 PRED: NORMAL
FEV1-POST: NORMAL
FEV1-PRE: NORMAL
FEV1/FVC %PRED-POST: NORMAL
FEV1/FVC %PRED-PRE: NORMAL
FEV1/FVC PRED: NORMAL
FEV1/FVC-POST: 99 %
FEV1/FVC-PRE: 96 %
FVC %PRED-POST: NORMAL
FVC %PRED-PRE: NORMAL
FVC PRED: NORMAL
FVC-POST: NORMAL
FVC-PRE: NORMAL
GAW %PRED: NORMAL
GAW PRED: NORMAL
GAW: NORMAL
IC PRE %PRED: NORMAL
IC PRED: NORMAL
IC: NORMAL
MEP: NORMAL
MIP: NORMAL
MVV %PRED-PRE: NORMAL
MVV PRED: NORMAL
MVV-PRE: NORMAL
PEF %PRED-POST: NORMAL
PEF %PRED-PRE: NORMAL
PEF PRED: NORMAL
PEF%CHNG: NORMAL
PEF-POST: NORMAL
PEF-PRE: NORMAL
RAW %PRED: NORMAL
RAW PRED: NORMAL
RAW: NORMAL
RV PRE %PRED: NORMAL
RV PRED: NORMAL
RV: NORMAL
SVC %PRED: NORMAL
SVC PRED: NORMAL
SVC: NORMAL
TLC PRE %PRED: 87 %
TLC PRED: NORMAL
TLC: NORMAL
VA %PRED: NORMAL
VA PRED: NORMAL
VA: NORMAL
VTG %PRED: NORMAL
VTG PRED: NORMAL
VTG: NORMAL

## 2025-06-24 PROCEDURE — 77067 SCR MAMMO BI INCL CAD: CPT

## 2025-06-24 PROCEDURE — 94729 DIFFUSING CAPACITY: CPT

## 2025-06-24 PROCEDURE — 94760 N-INVAS EAR/PLS OXIMETRY 1: CPT

## 2025-06-24 PROCEDURE — 6370000000 HC RX 637 (ALT 250 FOR IP): Performed by: INTERNAL MEDICINE

## 2025-06-24 PROCEDURE — 71271 CT THORAX LUNG CANCER SCR C-: CPT

## 2025-06-24 PROCEDURE — 94060 EVALUATION OF WHEEZING: CPT

## 2025-06-24 PROCEDURE — 94726 PLETHYSMOGRAPHY LUNG VOLUMES: CPT

## 2025-06-24 RX ORDER — ALBUTEROL SULFATE 90 UG/1
4 INHALANT RESPIRATORY (INHALATION) EVERY 6 HOURS PRN
Status: DISCONTINUED | OUTPATIENT
Start: 2025-06-24 | End: 2025-06-25 | Stop reason: HOSPADM

## 2025-06-24 RX ADMIN — Medication 4 PUFF: at 08:09

## 2025-06-24 ASSESSMENT — PULMONARY FUNCTION TESTS
FEV1/FVC_PRE: 96
FEV1_PERCENT_PREDICTED_POST: 80
FEV1/FVC_POST: 99
FEV1_PERCENT_PREDICTED_PRE: 77

## 2025-06-25 NOTE — PROCEDURES
Pulmonary Function Testing      Patient name:  Aracelis Malone      Unit #:   [id number]   Date of test:  6/24/2025   Date of interpretation:   6/25/2025    Ms. Aracelis Malone is a 75 y.o. year-old female. The spirometry data were acceptable and reproducible.     Spirometry:  Flow volume loops were normal. The FEV-1/FVC ratio was normal. The pre-bronchodilator FEV-1 was 1.72 liters (-1.19 Z score), which was normal. The FVC was 2.29, -1.12 Z score), which was normal. Response to inhaled bronchodilators (albuterol) was not performed.    Lung volumes:  Lung volumes were tested by plethysmography. The total lung capacity was 4.71 liters (-1.2 Z score), which was normal. The residual volume was 2.35 liters (-0.06 Z score), which was normal. The ratio of residual volume to total lung capacity (RV/TLC) was 0.85 Z score, which was normal.     Diffusion capacity was found to be -1.56 Z score which is normal.      Interpretation:  Normal pulmonary function testing    Comments:  Z score  Mild -1.645 to -2.5  Moderate -2.5 to -4.0  Severe: < -4.0     Using Z-scores can reduce age and height bias, and the recommended thresholds correlate with mortality risk.    Lc Rodriguez MD

## 2025-06-30 ENCOUNTER — RESULTS FOLLOW-UP (OUTPATIENT)
Dept: INTERNAL MEDICINE CLINIC | Age: 75
End: 2025-06-30

## 2025-07-07 DIAGNOSIS — F51.01 PRIMARY INSOMNIA: ICD-10-CM

## 2025-07-08 RX ORDER — ZOLPIDEM TARTRATE 10 MG/1
TABLET ORAL
Qty: 90 TABLET | Refills: 0 | Status: SHIPPED | OUTPATIENT
Start: 2025-07-08 | End: 2025-10-06

## 2025-07-08 NOTE — TELEPHONE ENCOUNTER
Recent Visits  Date Type Provider Dept   05/16/25 Office Visit Chas Glover MD [facility]   11/15/24 Office Visit Chas Glover MD [facility]   05/13/24 Office Visit Chas Glover MD [facility]   Showing recent visits within past 540 days with a meds authorizing provider and meeting all other requirements  Future Appointments  Date Type Provider Dept   11/17/25 Appointment Chas Glover MD [facility]   Showing future appointments within next 150 days with a meds authorizing provider and meeting all other requirements     5/16/2025

## 2025-07-21 DIAGNOSIS — E11.69 TYPE 2 DIABETES MELLITUS WITH HYPERLIPIDEMIA (HCC): ICD-10-CM

## 2025-07-21 DIAGNOSIS — E11.59 HYPERTENSION ASSOCIATED WITH DIABETES (HCC): ICD-10-CM

## 2025-07-21 DIAGNOSIS — E78.5 TYPE 2 DIABETES MELLITUS WITH HYPERLIPIDEMIA (HCC): ICD-10-CM

## 2025-07-21 DIAGNOSIS — I15.2 HYPERTENSION ASSOCIATED WITH DIABETES (HCC): ICD-10-CM

## 2025-07-21 RX ORDER — LOSARTAN POTASSIUM 100 MG/1
100 TABLET ORAL DAILY
Qty: 90 TABLET | Refills: 1 | Status: SHIPPED | OUTPATIENT
Start: 2025-07-21

## 2025-07-21 NOTE — TELEPHONE ENCOUNTER
Recent Visits  Date Type Provider Dept   05/16/25 Office Visit Chas Glover MD [facility]   11/15/24 Office Visit Chas Glover MD [facility]   05/13/24 Office Visit Chas Glover MD [facility]   Showing recent visits within past 540 days with a meds authorizing provider and meeting all other requirements  Future Appointments  Date Type Provider Dept   11/17/25 Appointment Chas Glover MD [facility]   Showing future appointments within next 150 days with a meds authorizing provider and meeting all other requirements     5/16/2025

## 2025-08-27 ENCOUNTER — OFFICE VISIT (OUTPATIENT)
Dept: INTERNAL MEDICINE CLINIC | Age: 75
End: 2025-08-27
Payer: MEDICARE

## 2025-08-27 VITALS — DIASTOLIC BLOOD PRESSURE: 68 MMHG | WEIGHT: 159 LBS | BODY MASS INDEX: 25.66 KG/M2 | SYSTOLIC BLOOD PRESSURE: 126 MMHG

## 2025-08-27 DIAGNOSIS — E78.5 TYPE 2 DIABETES MELLITUS WITH HYPERLIPIDEMIA (HCC): Primary | ICD-10-CM

## 2025-08-27 DIAGNOSIS — E11.59 HYPERTENSION ASSOCIATED WITH DIABETES (HCC): ICD-10-CM

## 2025-08-27 DIAGNOSIS — E78.5 HYPERLIPIDEMIA, UNSPECIFIED HYPERLIPIDEMIA TYPE: ICD-10-CM

## 2025-08-27 DIAGNOSIS — I15.2 HYPERTENSION ASSOCIATED WITH DIABETES (HCC): ICD-10-CM

## 2025-08-27 DIAGNOSIS — E11.69 TYPE 2 DIABETES MELLITUS WITH HYPERLIPIDEMIA (HCC): Primary | ICD-10-CM

## 2025-08-27 LAB — HBA1C MFR BLD: 7 %

## 2025-08-27 PROCEDURE — 83036 HEMOGLOBIN GLYCOSYLATED A1C: CPT | Performed by: INTERNAL MEDICINE

## 2025-08-27 RX ORDER — METFORMIN HYDROCHLORIDE 500 MG/1
500 TABLET, EXTENDED RELEASE ORAL 2 TIMES DAILY WITH MEALS
Qty: 180 TABLET | Refills: 1 | Status: SHIPPED | OUTPATIENT
Start: 2025-08-27

## 2025-08-31 DIAGNOSIS — I70.0 THORACIC AORTA ATHEROSCLEROSIS: ICD-10-CM

## 2025-08-31 DIAGNOSIS — E11.69 TYPE 2 DIABETES MELLITUS WITH HYPERLIPIDEMIA (HCC): ICD-10-CM

## 2025-08-31 DIAGNOSIS — E78.5 TYPE 2 DIABETES MELLITUS WITH HYPERLIPIDEMIA (HCC): ICD-10-CM

## 2025-09-02 RX ORDER — EZETIMIBE 10 MG/1
10 TABLET ORAL DAILY
Qty: 90 TABLET | Refills: 1 | Status: SHIPPED | OUTPATIENT
Start: 2025-09-02